# Patient Record
Sex: FEMALE | Race: WHITE | Employment: OTHER | URBAN - METROPOLITAN AREA
[De-identification: names, ages, dates, MRNs, and addresses within clinical notes are randomized per-mention and may not be internally consistent; named-entity substitution may affect disease eponyms.]

---

## 2017-03-07 PROBLEM — M25.562 CHRONIC PAIN OF LEFT KNEE: Status: ACTIVE | Noted: 2017-03-07

## 2017-03-07 PROBLEM — G89.29 CHRONIC PAIN OF LEFT KNEE: Status: ACTIVE | Noted: 2017-03-07

## 2018-04-23 ENCOUNTER — ANESTHESIA EVENT (OUTPATIENT)
Dept: OPERATING ROOM | Age: 63
End: 2018-04-23
Payer: MEDICAID

## 2018-04-24 ENCOUNTER — ANESTHESIA (OUTPATIENT)
Dept: OPERATING ROOM | Age: 63
End: 2018-04-24
Payer: MEDICAID

## 2018-04-24 ENCOUNTER — HOSPITAL ENCOUNTER (OUTPATIENT)
Age: 63
Setting detail: OUTPATIENT SURGERY
Discharge: HOME | End: 2018-04-24
Attending: PHYSICAL MEDICINE & REHABILITATION | Admitting: PHYSICAL MEDICINE & REHABILITATION
Payer: MEDICAID

## 2018-04-24 ENCOUNTER — HOSPITAL ENCOUNTER (OUTPATIENT)
Dept: OPERATING ROOM | Age: 63
Discharge: HOME | End: 2018-04-24
Payer: MEDICAID

## 2018-04-24 VITALS
RESPIRATION RATE: 16 BRPM | OXYGEN SATURATION: 97 % | WEIGHT: 238 LBS | HEART RATE: 80 BPM | SYSTOLIC BLOOD PRESSURE: 161 MMHG | HEIGHT: 66 IN | DIASTOLIC BLOOD PRESSURE: 82 MMHG | BODY MASS INDEX: 38.25 KG/M2 | TEMPERATURE: 98 F

## 2018-04-24 VITALS
SYSTOLIC BLOOD PRESSURE: 173 MMHG | OXYGEN SATURATION: 100 % | DIASTOLIC BLOOD PRESSURE: 102 MMHG | RESPIRATION RATE: 12 BRPM

## 2018-04-24 DIAGNOSIS — M46.1 SACROILIITIS (HCC): ICD-10-CM

## 2018-04-24 PROBLEM — M47.818 SACRAL SPONDYLOSIS: Chronic | Status: ACTIVE | Noted: 2018-04-24

## 2018-04-24 PROCEDURE — 3700000000 HC ANESTHESIA ATTENDED CARE: Performed by: PHYSICAL MEDICINE & REHABILITATION

## 2018-04-24 PROCEDURE — 2580000003 HC RX 258: Performed by: ANESTHESIOLOGY

## 2018-04-24 PROCEDURE — 2500000003 HC RX 250 WO HCPCS: Performed by: PHYSICAL MEDICINE & REHABILITATION

## 2018-04-24 PROCEDURE — 6360000002 HC RX W HCPCS: Performed by: NURSE ANESTHETIST, CERTIFIED REGISTERED

## 2018-04-24 PROCEDURE — 6360000002 HC RX W HCPCS: Performed by: PHYSICAL MEDICINE & REHABILITATION

## 2018-04-24 PROCEDURE — 3209999900 FLUORO FOR SURGICAL PROCEDURES

## 2018-04-24 PROCEDURE — 7100000011 HC PHASE II RECOVERY - ADDTL 15 MIN: Performed by: PHYSICAL MEDICINE & REHABILITATION

## 2018-04-24 PROCEDURE — 7100000010 HC PHASE II RECOVERY - FIRST 15 MIN: Performed by: PHYSICAL MEDICINE & REHABILITATION

## 2018-04-24 PROCEDURE — 2500000003 HC RX 250 WO HCPCS: Performed by: NURSE ANESTHETIST, CERTIFIED REGISTERED

## 2018-04-24 PROCEDURE — 3600000005 HC SURGERY LEVEL 5 BASE: Performed by: PHYSICAL MEDICINE & REHABILITATION

## 2018-04-24 RX ORDER — FENTANYL CITRATE 50 UG/ML
50 INJECTION, SOLUTION INTRAMUSCULAR; INTRAVENOUS EVERY 5 MIN PRN
Status: DISCONTINUED | OUTPATIENT
Start: 2018-04-24 | End: 2018-04-24 | Stop reason: HOSPADM

## 2018-04-24 RX ORDER — LABETALOL HYDROCHLORIDE 5 MG/ML
5 INJECTION, SOLUTION INTRAVENOUS EVERY 10 MIN PRN
Status: DISCONTINUED | OUTPATIENT
Start: 2018-04-24 | End: 2018-04-24 | Stop reason: HOSPADM

## 2018-04-24 RX ORDER — SODIUM CHLORIDE, SODIUM LACTATE, POTASSIUM CHLORIDE, CALCIUM CHLORIDE 600; 310; 30; 20 MG/100ML; MG/100ML; MG/100ML; MG/100ML
INJECTION, SOLUTION INTRAVENOUS CONTINUOUS
Status: DISCONTINUED | OUTPATIENT
Start: 2018-04-24 | End: 2018-04-24 | Stop reason: HOSPADM

## 2018-04-24 RX ORDER — LIDOCAINE HYDROCHLORIDE 10 MG/ML
INJECTION, SOLUTION EPIDURAL; INFILTRATION; INTRACAUDAL; PERINEURAL PRN
Status: DISCONTINUED | OUTPATIENT
Start: 2018-04-24 | End: 2018-04-24 | Stop reason: HOSPADM

## 2018-04-24 RX ORDER — OXYCODONE HYDROCHLORIDE AND ACETAMINOPHEN 5; 325 MG/1; MG/1
1 TABLET ORAL EVERY 4 HOURS PRN
Status: DISCONTINUED | OUTPATIENT
Start: 2018-04-24 | End: 2018-04-24 | Stop reason: HOSPADM

## 2018-04-24 RX ORDER — MEPERIDINE HYDROCHLORIDE 25 MG/ML
12.5 INJECTION INTRAMUSCULAR; INTRAVENOUS; SUBCUTANEOUS EVERY 5 MIN PRN
Status: DISCONTINUED | OUTPATIENT
Start: 2018-04-24 | End: 2018-04-24 | Stop reason: HOSPADM

## 2018-04-24 RX ORDER — HYDRALAZINE HYDROCHLORIDE 20 MG/ML
5 INJECTION INTRAMUSCULAR; INTRAVENOUS EVERY 10 MIN PRN
Status: DISCONTINUED | OUTPATIENT
Start: 2018-04-24 | End: 2018-04-24 | Stop reason: HOSPADM

## 2018-04-24 RX ORDER — ALFENTANIL HYDROCHLORIDE 500 UG/ML
INJECTION INTRAVENOUS PRN
Status: DISCONTINUED | OUTPATIENT
Start: 2018-04-24 | End: 2018-04-24 | Stop reason: SDUPTHER

## 2018-04-24 RX ORDER — PROMETHAZINE HYDROCHLORIDE 25 MG/ML
25 INJECTION, SOLUTION INTRAMUSCULAR; INTRAVENOUS
Status: DISCONTINUED | OUTPATIENT
Start: 2018-04-24 | End: 2018-04-24 | Stop reason: HOSPADM

## 2018-04-24 RX ORDER — PROPOFOL 10 MG/ML
INJECTION, EMULSION INTRAVENOUS PRN
Status: DISCONTINUED | OUTPATIENT
Start: 2018-04-24 | End: 2018-04-24 | Stop reason: SDUPTHER

## 2018-04-24 RX ORDER — MORPHINE SULFATE 2 MG/ML
1 INJECTION, SOLUTION INTRAMUSCULAR; INTRAVENOUS EVERY 5 MIN PRN
Status: DISCONTINUED | OUTPATIENT
Start: 2018-04-24 | End: 2018-04-24 | Stop reason: HOSPADM

## 2018-04-24 RX ORDER — LIDOCAINE HYDROCHLORIDE 20 MG/ML
INJECTION, SOLUTION EPIDURAL; INFILTRATION; INTRACAUDAL; PERINEURAL PRN
Status: DISCONTINUED | OUTPATIENT
Start: 2018-04-24 | End: 2018-04-24 | Stop reason: SDUPTHER

## 2018-04-24 RX ADMIN — ALFENTANIL HYDROCHLORIDE 250 MCG: 500 INJECTION INTRAVENOUS at 12:12

## 2018-04-24 RX ADMIN — LIDOCAINE HYDROCHLORIDE 20 MG: 20 INJECTION, SOLUTION EPIDURAL; INFILTRATION; INTRACAUDAL; PERINEURAL at 12:13

## 2018-04-24 RX ADMIN — SODIUM CHLORIDE, POTASSIUM CHLORIDE, SODIUM LACTATE AND CALCIUM CHLORIDE: 600; 310; 30; 20 INJECTION, SOLUTION INTRAVENOUS at 10:35

## 2018-04-24 RX ADMIN — PROPOFOL 20 MG: 10 INJECTION, EMULSION INTRAVENOUS at 12:14

## 2018-04-24 RX ADMIN — PROPOFOL 20 MG: 10 INJECTION, EMULSION INTRAVENOUS at 12:13

## 2018-04-24 ASSESSMENT — PAIN DESCRIPTION - PAIN TYPE
TYPE: SURGICAL PAIN;CHRONIC PAIN
TYPE: SURGICAL PAIN;CHRONIC PAIN
TYPE: CHRONIC PAIN

## 2018-04-24 ASSESSMENT — PAIN DESCRIPTION - ORIENTATION
ORIENTATION: LEFT;LOWER;UPPER
ORIENTATION: LEFT;LOWER
ORIENTATION: LEFT;LOWER

## 2018-04-24 ASSESSMENT — PAIN DESCRIPTION - FREQUENCY
FREQUENCY: CONTINUOUS

## 2018-04-24 ASSESSMENT — PAIN DESCRIPTION - LOCATION
LOCATION: BACK

## 2018-04-24 ASSESSMENT — PAIN - FUNCTIONAL ASSESSMENT: PAIN_FUNCTIONAL_ASSESSMENT: 0-10

## 2018-04-24 ASSESSMENT — PAIN DESCRIPTION - DESCRIPTORS
DESCRIPTORS: DISCOMFORT
DESCRIPTORS: ACHING;DISCOMFORT
DESCRIPTORS: ACHING;DISCOMFORT

## 2018-04-24 ASSESSMENT — PAIN SCALES - GENERAL
PAINLEVEL_OUTOF10: 5
PAINLEVEL_OUTOF10: 7
PAINLEVEL_OUTOF10: 5

## 2018-04-24 ASSESSMENT — PULMONARY FUNCTION TESTS
PIF_VALUE: 0

## 2018-04-24 ASSESSMENT — LIFESTYLE VARIABLES: SMOKING_STATUS: 1

## 2018-04-30 ENCOUNTER — ANESTHESIA EVENT (OUTPATIENT)
Dept: OPERATING ROOM | Age: 63
End: 2018-04-30
Payer: MEDICAID

## 2018-04-30 ASSESSMENT — LIFESTYLE VARIABLES: SMOKING_STATUS: 1

## 2018-05-01 ENCOUNTER — HOSPITAL ENCOUNTER (OUTPATIENT)
Age: 63
Setting detail: OUTPATIENT SURGERY
Discharge: HOME | End: 2018-05-01
Attending: PHYSICAL MEDICINE & REHABILITATION | Admitting: PHYSICAL MEDICINE & REHABILITATION
Payer: MEDICAID

## 2018-05-01 ENCOUNTER — ANESTHESIA (OUTPATIENT)
Dept: OPERATING ROOM | Age: 63
End: 2018-05-01
Payer: MEDICAID

## 2018-05-01 VITALS
HEART RATE: 77 BPM | SYSTOLIC BLOOD PRESSURE: 115 MMHG | DIASTOLIC BLOOD PRESSURE: 64 MMHG | HEIGHT: 66 IN | TEMPERATURE: 98.6 F | RESPIRATION RATE: 16 BRPM | WEIGHT: 238 LBS | BODY MASS INDEX: 38.25 KG/M2 | OXYGEN SATURATION: 98 %

## 2018-05-01 VITALS
SYSTOLIC BLOOD PRESSURE: 140 MMHG | RESPIRATION RATE: 9 BRPM | TEMPERATURE: 97.7 F | OXYGEN SATURATION: 99 % | DIASTOLIC BLOOD PRESSURE: 90 MMHG

## 2018-05-01 DIAGNOSIS — M46.1 SACROILIITIS (HCC): ICD-10-CM

## 2018-05-01 PROCEDURE — 3700000000 HC ANESTHESIA ATTENDED CARE: Performed by: PHYSICAL MEDICINE & REHABILITATION

## 2018-05-01 PROCEDURE — 2500000003 HC RX 250 WO HCPCS: Performed by: PHYSICAL MEDICINE & REHABILITATION

## 2018-05-01 PROCEDURE — 7100000011 HC PHASE II RECOVERY - ADDTL 15 MIN: Performed by: PHYSICAL MEDICINE & REHABILITATION

## 2018-05-01 PROCEDURE — 7100000010 HC PHASE II RECOVERY - FIRST 15 MIN: Performed by: PHYSICAL MEDICINE & REHABILITATION

## 2018-05-01 PROCEDURE — 6360000002 HC RX W HCPCS: Performed by: NURSE ANESTHETIST, CERTIFIED REGISTERED

## 2018-05-01 PROCEDURE — 2500000003 HC RX 250 WO HCPCS: Performed by: NURSE ANESTHETIST, CERTIFIED REGISTERED

## 2018-05-01 PROCEDURE — 3600000005 HC SURGERY LEVEL 5 BASE: Performed by: PHYSICAL MEDICINE & REHABILITATION

## 2018-05-01 PROCEDURE — 2580000003 HC RX 258: Performed by: ANESTHESIOLOGY

## 2018-05-01 PROCEDURE — 6360000002 HC RX W HCPCS: Performed by: PHYSICAL MEDICINE & REHABILITATION

## 2018-05-01 RX ORDER — SODIUM CHLORIDE, SODIUM LACTATE, POTASSIUM CHLORIDE, CALCIUM CHLORIDE 600; 310; 30; 20 MG/100ML; MG/100ML; MG/100ML; MG/100ML
INJECTION, SOLUTION INTRAVENOUS CONTINUOUS
Status: DISCONTINUED | OUTPATIENT
Start: 2018-05-01 | End: 2018-05-01 | Stop reason: HOSPADM

## 2018-05-01 RX ORDER — FENTANYL CITRATE 50 UG/ML
50 INJECTION, SOLUTION INTRAMUSCULAR; INTRAVENOUS EVERY 5 MIN PRN
Status: DISCONTINUED | OUTPATIENT
Start: 2018-05-01 | End: 2018-05-01 | Stop reason: HOSPADM

## 2018-05-01 RX ORDER — ALFENTANIL HYDROCHLORIDE 500 UG/ML
INJECTION INTRAVENOUS PRN
Status: DISCONTINUED | OUTPATIENT
Start: 2018-05-01 | End: 2018-05-01 | Stop reason: SDUPTHER

## 2018-05-01 RX ORDER — MORPHINE SULFATE 2 MG/ML
1 INJECTION, SOLUTION INTRAMUSCULAR; INTRAVENOUS EVERY 5 MIN PRN
Status: DISCONTINUED | OUTPATIENT
Start: 2018-05-01 | End: 2018-05-01 | Stop reason: HOSPADM

## 2018-05-01 RX ORDER — OXYCODONE HYDROCHLORIDE AND ACETAMINOPHEN 5; 325 MG/1; MG/1
1 TABLET ORAL EVERY 4 HOURS PRN
Status: DISCONTINUED | OUTPATIENT
Start: 2018-05-01 | End: 2018-05-01 | Stop reason: HOSPADM

## 2018-05-01 RX ORDER — MEPERIDINE HYDROCHLORIDE 25 MG/ML
12.5 INJECTION INTRAMUSCULAR; INTRAVENOUS; SUBCUTANEOUS EVERY 5 MIN PRN
Status: DISCONTINUED | OUTPATIENT
Start: 2018-05-01 | End: 2018-05-01 | Stop reason: HOSPADM

## 2018-05-01 RX ORDER — MIDAZOLAM HYDROCHLORIDE 1 MG/ML
INJECTION INTRAMUSCULAR; INTRAVENOUS PRN
Status: DISCONTINUED | OUTPATIENT
Start: 2018-05-01 | End: 2018-05-01 | Stop reason: SDUPTHER

## 2018-05-01 RX ORDER — PROPOFOL 10 MG/ML
INJECTION, EMULSION INTRAVENOUS PRN
Status: DISCONTINUED | OUTPATIENT
Start: 2018-05-01 | End: 2018-05-01 | Stop reason: SDUPTHER

## 2018-05-01 RX ORDER — PROMETHAZINE HYDROCHLORIDE 25 MG/ML
25 INJECTION, SOLUTION INTRAMUSCULAR; INTRAVENOUS
Status: DISCONTINUED | OUTPATIENT
Start: 2018-05-01 | End: 2018-05-01 | Stop reason: HOSPADM

## 2018-05-01 RX ORDER — HYDRALAZINE HYDROCHLORIDE 20 MG/ML
5 INJECTION INTRAMUSCULAR; INTRAVENOUS EVERY 10 MIN PRN
Status: DISCONTINUED | OUTPATIENT
Start: 2018-05-01 | End: 2018-05-01 | Stop reason: HOSPADM

## 2018-05-01 RX ORDER — LIDOCAINE HYDROCHLORIDE 10 MG/ML
INJECTION, SOLUTION EPIDURAL; INFILTRATION; INTRACAUDAL; PERINEURAL PRN
Status: DISCONTINUED | OUTPATIENT
Start: 2018-05-01 | End: 2018-05-01 | Stop reason: HOSPADM

## 2018-05-01 RX ORDER — LABETALOL HYDROCHLORIDE 5 MG/ML
5 INJECTION, SOLUTION INTRAVENOUS EVERY 10 MIN PRN
Status: DISCONTINUED | OUTPATIENT
Start: 2018-05-01 | End: 2018-05-01 | Stop reason: HOSPADM

## 2018-05-01 RX ADMIN — MIDAZOLAM HYDROCHLORIDE 2 MG: 1 INJECTION INTRAMUSCULAR; INTRAVENOUS at 08:18

## 2018-05-01 RX ADMIN — SODIUM CHLORIDE, POTASSIUM CHLORIDE, SODIUM LACTATE AND CALCIUM CHLORIDE: 600; 310; 30; 20 INJECTION, SOLUTION INTRAVENOUS at 07:34

## 2018-05-01 RX ADMIN — ALFENTANIL HYDROCHLORIDE 250 MCG: 500 INJECTION INTRAVENOUS at 08:18

## 2018-05-01 RX ADMIN — PROPOFOL 40 MG: 10 INJECTION, EMULSION INTRAVENOUS at 08:20

## 2018-05-01 RX ADMIN — ALFENTANIL HYDROCHLORIDE 250 MCG: 500 INJECTION INTRAVENOUS at 08:20

## 2018-05-01 ASSESSMENT — PULMONARY FUNCTION TESTS
PIF_VALUE: 0

## 2018-05-01 ASSESSMENT — PAIN - FUNCTIONAL ASSESSMENT: PAIN_FUNCTIONAL_ASSESSMENT: 0-10

## 2018-05-01 ASSESSMENT — PAIN SCALES - GENERAL
PAINLEVEL_OUTOF10: 0

## 2018-05-01 ASSESSMENT — PAIN DESCRIPTION - DESCRIPTORS: DESCRIPTORS: ACHING

## 2018-05-29 LAB

## 2018-06-18 RX ORDER — SODIUM CHLORIDE, SODIUM LACTATE, POTASSIUM CHLORIDE, CALCIUM CHLORIDE 600; 310; 30; 20 MG/100ML; MG/100ML; MG/100ML; MG/100ML
INJECTION, SOLUTION INTRAVENOUS CONTINUOUS
Status: CANCELLED | OUTPATIENT
Start: 2018-06-18

## 2018-06-25 ENCOUNTER — ANESTHESIA EVENT (OUTPATIENT)
Dept: OPERATING ROOM | Age: 63
End: 2018-06-25
Payer: MEDICAID

## 2018-06-25 ASSESSMENT — LIFESTYLE VARIABLES: SMOKING_STATUS: 1

## 2018-06-25 NOTE — ANESTHESIA PRE PROCEDURE
tablet 5    lamoTRIgine (LAMICTAL) 200 MG tablet Take 200 mg by mouth daily       LORazepam (ATIVAN) 1 MG tablet Take 1 mg by mouth every 8 hours as needed.  QUEtiapine (SEROQUEL) 100 MG tablet Take 700 mg by mouth nightly       QUEtiapine (SEROQUEL XR) 400 MG XR tablet Take 400 mg by mouth nightly.  hydrocortisone 2.5 % cream Apply  topically as needed. Apply topically 2 times daily. No current facility-administered medications for this visit. Allergies:     Allergies   Allergen Reactions    Latex      With condom     Benadryl [Diphenhydramine]      Does the opposite      Sulfa Antibiotics      As a child    Vistaril [Hydroxyzine Hcl]      \"does the complete opposite\"       Problem List:    Patient Active Problem List   Diagnosis Code    Neck pain M54.2    Back pain M54.9    Cerebral atherosclerosis I67.2    Cervical disc displacement M50.20    Cervical radiculopathy M54.12    Disc displacement, lumbar M51.26    Lumbar radiculopathy M54.16    Peripheral neuropathy (HCC) G62.9    Chronic pain of left knee M25.562, G89.29    Sacroiliitis  M46.1    Sacral spondylosis M47.818       Past Medical History:        Diagnosis Date    Asthma     [facility]     Cancer ([facility]     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 Pedro Juarez    Chronic back pain     upper, mid and lower back    Dementia     Depression     bipolar    H/O being hospitalized     for cervical, thoracic, and lumbar pain, at [address] H/O colonoscopy     Head injury two years ago    Headache(784.0)     8/10 severity    Memory difficulties     mispelling words, dialing wrong numbers    Migraine     10/10 severity, 6 per year    Numbness and tingling     both arms    OCD (obsessive compulsive disorder)        Past Surgical History:        Procedure Laterality Date    HYSTERECTOMY      partial   [address] NERVE BLOCK Left 04/24/2018    left sacroiliac joint injection #1 with IV sedation    NERVE BLOCK Left 05/01/2018    left sacroiliac joint injection #2    [place] OSMEL NOSE/CLEFT LIP/TIP Left 4/24/2018    LEFT SACROILIAC JOINT INJECTION #1 WITH IV SEDATION performed by Gladis Cruz DO at [address] [facility] NOSE/CLEFT LIP/TIP Left 5/1/2018    LEFT SACROILIAC JOINT INJECTION #2 WITH IV SEDATION performed by Gladis Cruz DO at [address]  2005    2 neck surgeries & 1 -LB surgery       Social History:    Social History   Substance Use Topics    Smoking status: Current Every Day Smoker     Packs/day: 1.00     Years: 10.00    Smokeless tobacco: Never Used    Alcohol use Yes      Comment: moderate                                Ready to quit: Not Answered  Counseling given: Not Answered      Vital Signs (Current): There were no vitals filed for this visit. BP Readings from Last 3 Encounters:   05/01/18 (!) 140/90   05/01/18 115/64   04/24/18 (!) 173/102       NPO Status:                                                                             BMI:   Wt Readings from Last 3 Encounters:   05/01/18 238 lb (108 kg)   04/24/18 238 lb (108 kg)   08/30/17 240 lb (108.9 kg)     There is no height or weight on file to calculate BMI.    CBC: No results found for: WBC, RBC, HGB, HCT, MCV, RDW, PLT    CMP: No results found for: NA, K, CL, CO2, BUN, CREATININE, GFRAA, AGRATIO, LABGLOM, GLUCOSE, PROT, CALCIUM, BILITOT, ALKPHOS, AST, ALT    POC Tests: No results for input(s): POCGLU, POCNA, POCK, POCCL, POCBUN, POCHEMO, POCHCT in the last 72 hours.     Coags: No results found for: PROTIME, INR, APTT    HCG (If Applicable): No results found for: PREGTESTUR, PREGSERUM, HCG, HCGQUANT     ABGs: No results found for: PHART, PO2ART, KGC2OVU, OYD7FLJ, BEART, A5RQNTOB     Type & Screen (If Applicable):  No results found for: [facility]    Anesthesia Evaluation  Patient summary reviewed  Airway: Mallampati: III  TM distance: >3 FB   Neck ROM: limited  Mouth opening: > = 3 FB Dental: normal exam     Comment: Stained teeth    Pulmonary: breath sounds clear to auscultation  (+) asthma: current smoker          Patient did not smoke on day of surgery. ROS comment: Current Smoker 1 ppd. Cardiovascular:Negative CV ROS  Exercise tolerance: good (>4 METS),           Rhythm: regular  Rate: normal                    Neuro/Psych:   (+) neuromuscular disease:, headaches (H/O Head injury.): migraine headaches, psychiatric history:             ROS comment: Chronic Back Problems  Numbness and tingling. Memory problems. GI/Hepatic/Renal: Neg GI/Hepatic/Renal ROS            Endo/Other: Negative Endo/Other ROS   (+) : arthritis:., .                  ROS comment: Cancer top left wrist and right hand. Abdominal:   (+) obese,         Vascular: negative vascular ROS. Anesthesia Plan      MAC     ASA 3       Induction: intravenous. Plan discussed with CRNA.     Attending anesthesiologist reviewed and agrees with Sangeeta King MD   6/25/2018

## 2018-06-26 ENCOUNTER — HOSPITAL ENCOUNTER (OUTPATIENT)
Dept: OPERATING ROOM | Age: 63
Discharge: HOME | End: 2018-06-26
Payer: MEDICAID

## 2018-06-26 ENCOUNTER — ANESTHESIA (OUTPATIENT)
Dept: OPERATING ROOM | Age: 63
End: 2018-06-26
Payer: MEDICAID

## 2018-06-26 DIAGNOSIS — M46.1 SACROILIITIS (HCC): ICD-10-CM

## 2018-07-23 ASSESSMENT — LIFESTYLE VARIABLES: SMOKING_STATUS: 1

## 2018-07-23 NOTE — ANESTHESIA PRE PROCEDURE
(LIPITOR) 20 MG tablet Take 1 tablet by mouth daily. 30 tablet 5    lamoTRIgine (LAMICTAL) 200 MG tablet Take 200 mg by mouth daily       LORazepam (ATIVAN) 1 MG tablet Take 1 mg by mouth every 8 hours as needed.  QUEtiapine (SEROQUEL) 100 MG tablet Take 700 mg by mouth nightly       hydrocortisone 2.5 % cream Apply  topically as needed. Apply topically 2 times daily. No current facility-administered medications for this visit. Allergies:     Allergies   Allergen Reactions    Latex      With condom     Benadryl [Diphenhydramine]      Does the opposite      Sulfa Antibiotics      As a child    Vistaril [Hydroxyzine Hcl]      \"does the complete opposite\"       Problem List:    Patient Active Problem List   Diagnosis Code    Neck pain M54.2    Back pain M54.9    Cerebral atherosclerosis I67.2    Cervical disc displacement M50.20    Cervical radiculopathy M54.12    Disc displacement, lumbar M51.26    Lumbar radiculopathy M54.16    Peripheral neuropathy (HCC) G62.9    Chronic pain of left knee M25.562, G89.29    Sacroiliitis  M46.1    Sacral spondylosis M47.818       Past Medical History:        Diagnosis Date    Asthma     [facility] ([facility]     Cancer ([facility] [address] [facility]     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 Ingrid Gaston    Chronic back pain     upper, mid and lower back    Dementia     Depression     bipolar    H/O being hospitalized     for cervical, thoracic, and lumbar pain, at [address] H/O colonoscopy     Head injury two years ago    Headache(784.0)     8/10 severity    Hyperlipidemia     Memory difficulties     mispelling words, dialing wrong numbers    Migraine     10/10 severity, 6 per year    Numbness and tingling     both arms    OCD (obsessive compulsive disorder)        Past Surgical History:        Procedure Laterality Date    HYSTERECTOMY      partial   [address]

## 2018-07-24 ENCOUNTER — HOSPITAL ENCOUNTER (OUTPATIENT)
Dept: OPERATING ROOM | Age: 63
Setting detail: OUTPATIENT SURGERY
Discharge: HOME | End: 2018-07-24
Attending: PHYSICAL MEDICINE & REHABILITATION
Payer: MEDICAID

## 2018-07-24 ENCOUNTER — HOSPITAL ENCOUNTER (OUTPATIENT)
Age: 63
Setting detail: OUTPATIENT SURGERY
Discharge: HOME | End: 2018-07-24
Attending: PHYSICAL MEDICINE & REHABILITATION | Admitting: PHYSICAL MEDICINE & REHABILITATION
Payer: MEDICAID

## 2018-07-24 VITALS
DIASTOLIC BLOOD PRESSURE: 84 MMHG | RESPIRATION RATE: 14 BRPM | SYSTOLIC BLOOD PRESSURE: 126 MMHG | OXYGEN SATURATION: 100 % | TEMPERATURE: 98.6 F

## 2018-07-24 VITALS
DIASTOLIC BLOOD PRESSURE: 72 MMHG | SYSTOLIC BLOOD PRESSURE: 136 MMHG | HEIGHT: 65 IN | WEIGHT: 227 LBS | OXYGEN SATURATION: 98 % | HEART RATE: 80 BPM | BODY MASS INDEX: 37.82 KG/M2 | RESPIRATION RATE: 14 BRPM | TEMPERATURE: 98.6 F

## 2018-07-24 DIAGNOSIS — M46.1 SACROILIITIS (HCC): ICD-10-CM

## 2018-07-24 PROCEDURE — 6360000002 HC RX W HCPCS: Performed by: NURSE ANESTHETIST, CERTIFIED REGISTERED

## 2018-07-24 PROCEDURE — 7100000011 HC PHASE II RECOVERY - ADDTL 15 MIN: Performed by: PHYSICAL MEDICINE & REHABILITATION

## 2018-07-24 PROCEDURE — 3700000001 HC ADD 15 MINUTES (ANESTHESIA): Performed by: PHYSICAL MEDICINE & REHABILITATION

## 2018-07-24 PROCEDURE — 7100000010 HC PHASE II RECOVERY - FIRST 15 MIN: Performed by: PHYSICAL MEDICINE & REHABILITATION

## 2018-07-24 PROCEDURE — 3600000015 HC SURGERY LEVEL 5 ADDTL 15MIN: Performed by: PHYSICAL MEDICINE & REHABILITATION

## 2018-07-24 PROCEDURE — 6360000002 HC RX W HCPCS: Performed by: PHYSICAL MEDICINE & REHABILITATION

## 2018-07-24 PROCEDURE — 2500000003 HC RX 250 WO HCPCS: Performed by: PHYSICAL MEDICINE & REHABILITATION

## 2018-07-24 PROCEDURE — 3700000000 HC ANESTHESIA ATTENDED CARE: Performed by: PHYSICAL MEDICINE & REHABILITATION

## 2018-07-24 PROCEDURE — 3209999900 FLUORO FOR SURGICAL PROCEDURES

## 2018-07-24 PROCEDURE — 2580000003 HC RX 258: Performed by: ANESTHESIOLOGY

## 2018-07-24 PROCEDURE — C1713 ANCHOR/SCREW BN/BN,TIS/BN: HCPCS | Performed by: PHYSICAL MEDICINE & REHABILITATION

## 2018-07-24 PROCEDURE — 2709999900 HC NON-CHARGEABLE SUPPLY: Performed by: PHYSICAL MEDICINE & REHABILITATION

## 2018-07-24 PROCEDURE — 3600000005 HC SURGERY LEVEL 5 BASE: Performed by: PHYSICAL MEDICINE & REHABILITATION

## 2018-07-24 RX ORDER — MORPHINE SULFATE 2 MG/ML
1 INJECTION, SOLUTION INTRAMUSCULAR; INTRAVENOUS EVERY 5 MIN PRN
Status: DISCONTINUED | OUTPATIENT
Start: 2018-07-24 | End: 2018-07-24 | Stop reason: HOSPADM

## 2018-07-24 RX ORDER — LABETALOL HYDROCHLORIDE 5 MG/ML
5 INJECTION, SOLUTION INTRAVENOUS EVERY 10 MIN PRN
Status: DISCONTINUED | OUTPATIENT
Start: 2018-07-24 | End: 2018-07-24 | Stop reason: HOSPADM

## 2018-07-24 RX ORDER — FENTANYL CITRATE 50 UG/ML
50 INJECTION, SOLUTION INTRAMUSCULAR; INTRAVENOUS EVERY 5 MIN PRN
Status: DISCONTINUED | OUTPATIENT
Start: 2018-07-24 | End: 2018-07-24 | Stop reason: HOSPADM

## 2018-07-24 RX ORDER — LIDOCAINE HYDROCHLORIDE 10 MG/ML
INJECTION, SOLUTION EPIDURAL; INFILTRATION; INTRACAUDAL; PERINEURAL PRN
Status: DISCONTINUED | OUTPATIENT
Start: 2018-07-24 | End: 2018-07-24 | Stop reason: HOSPADM

## 2018-07-24 RX ORDER — SODIUM CHLORIDE, SODIUM LACTATE, POTASSIUM CHLORIDE, CALCIUM CHLORIDE 600; 310; 30; 20 MG/100ML; MG/100ML; MG/100ML; MG/100ML
INJECTION, SOLUTION INTRAVENOUS CONTINUOUS
Status: DISCONTINUED | OUTPATIENT
Start: 2018-07-24 | End: 2018-07-24 | Stop reason: HOSPADM

## 2018-07-24 RX ORDER — MEPERIDINE HYDROCHLORIDE 25 MG/ML
12.5 INJECTION INTRAMUSCULAR; INTRAVENOUS; SUBCUTANEOUS EVERY 5 MIN PRN
Status: DISCONTINUED | OUTPATIENT
Start: 2018-07-24 | End: 2018-07-24 | Stop reason: HOSPADM

## 2018-07-24 RX ORDER — FENTANYL CITRATE 50 UG/ML
INJECTION, SOLUTION INTRAMUSCULAR; INTRAVENOUS PRN
Status: DISCONTINUED | OUTPATIENT
Start: 2018-07-24 | End: 2018-07-24 | Stop reason: SDUPTHER

## 2018-07-24 RX ORDER — MIDAZOLAM HYDROCHLORIDE 1 MG/ML
INJECTION INTRAMUSCULAR; INTRAVENOUS PRN
Status: DISCONTINUED | OUTPATIENT
Start: 2018-07-24 | End: 2018-07-24 | Stop reason: SDUPTHER

## 2018-07-24 RX ORDER — DIPHENHYDRAMINE HYDROCHLORIDE 50 MG/ML
12.5 INJECTION INTRAMUSCULAR; INTRAVENOUS
Status: DISCONTINUED | OUTPATIENT
Start: 2018-07-24 | End: 2018-07-24 | Stop reason: HOSPADM

## 2018-07-24 RX ORDER — HYDROCODONE BITARTRATE AND ACETAMINOPHEN 5; 325 MG/1; MG/1
2 TABLET ORAL PRN
Status: DISCONTINUED | OUTPATIENT
Start: 2018-07-24 | End: 2018-07-24 | Stop reason: HOSPADM

## 2018-07-24 RX ORDER — PROMETHAZINE HYDROCHLORIDE 25 MG/ML
25 INJECTION, SOLUTION INTRAMUSCULAR; INTRAVENOUS
Status: DISCONTINUED | OUTPATIENT
Start: 2018-07-24 | End: 2018-07-24 | Stop reason: HOSPADM

## 2018-07-24 RX ORDER — HYDROCODONE BITARTRATE AND ACETAMINOPHEN 5; 325 MG/1; MG/1
1 TABLET ORAL PRN
Status: DISCONTINUED | OUTPATIENT
Start: 2018-07-24 | End: 2018-07-24 | Stop reason: HOSPADM

## 2018-07-24 RX ORDER — HYDRALAZINE HYDROCHLORIDE 20 MG/ML
5 INJECTION INTRAMUSCULAR; INTRAVENOUS EVERY 10 MIN PRN
Status: DISCONTINUED | OUTPATIENT
Start: 2018-07-24 | End: 2018-07-24 | Stop reason: HOSPADM

## 2018-07-24 RX ADMIN — FENTANYL CITRATE 50 MCG: 50 INJECTION, SOLUTION INTRAMUSCULAR; INTRAVENOUS at 08:49

## 2018-07-24 RX ADMIN — FENTANYL CITRATE 50 MCG: 50 INJECTION, SOLUTION INTRAMUSCULAR; INTRAVENOUS at 08:51

## 2018-07-24 RX ADMIN — SODIUM CHLORIDE, POTASSIUM CHLORIDE, SODIUM LACTATE AND CALCIUM CHLORIDE: 600; 310; 30; 20 INJECTION, SOLUTION INTRAVENOUS at 08:35

## 2018-07-24 RX ADMIN — CEFAZOLIN SODIUM 2 G: 2 SOLUTION INTRAVENOUS at 08:54

## 2018-07-24 RX ADMIN — MIDAZOLAM 2 MG: 1 INJECTION INTRAMUSCULAR; INTRAVENOUS at 08:49

## 2018-07-24 ASSESSMENT — PULMONARY FUNCTION TESTS
PIF_VALUE: 0

## 2018-07-24 ASSESSMENT — PAIN SCALES - GENERAL
PAINLEVEL_OUTOF10: 0

## 2018-07-24 ASSESSMENT — PAIN DESCRIPTION - DESCRIPTORS: DESCRIPTORS: ACHING

## 2018-07-24 ASSESSMENT — PAIN - FUNCTIONAL ASSESSMENT: PAIN_FUNCTIONAL_ASSESSMENT: 0-10

## 2018-07-24 NOTE — H&P
7/24/2018    Sujey Martinez    MRN: [id number]    Chief Complaint Of:  Lower back pain    Present Illness: Elena Sales presents today for left sacroiliac joint RF. Elena reports constant aching pain in the lower back that radiates across the lower back. Elena denies new neurologic complaints, bowel or bladder complaints or any other associated symptoms. 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 Amparo Casillas    Chronic back pain     upper, mid and lower back    Dementia     Depression     bipolar    H/O being hospitalized     for cervical, thoracic, and lumbar pain, at [address] H/O colonoscopy     Head injury two years ago    Headache(784.0)     8/10 severity    Hyperlipidemia     Memory difficulties     mispelling words, dialing wrong numbers    Migraine     10/10 severity, 6 per year    Numbness and tingling     both arms    OCD (obsessive compulsive disorder)        Pertinent Family History:  family history includes Alzheimer's Disease in her mother; Sherlyn Mota in her brother; Dementia in her mother; Claus Lus in her brother; Parkinsonism in her father. Medications:    Current Facility-Administered Medications:     ceFAZolin (ANCEF) 2 g in dextrose 3 % 50 mL IVPB (duplex), 2 g, Intravenous, Once, Nikki Chung, DO    lactated ringers infusion, , Intravenous, Continuous, Harshal Coy MD    Allergies:  Latex; Benadryl [diphenhydramine]; Sulfa antibiotics; and Vistaril [hydroxyzine hcl]    PHYSICAL EXAMINATION / GENERAL APPEARANCE:    Head: Normocephalic    Heart[de-identified] Regular rate    Lungs: Clear    Abdomen: Positive bowel sounds.  obese    Other Significant Findings:  None    Impression / Initial Diagnosis: sacroiliitis/sacral spondylosis

## 2018-07-24 NOTE — OP NOTE
Elena Sales     7/24/2018     PRE-OPERATIVE DIAGNOSIS: Patient Active Problem List:     Sacroiliitis      Sacral spondylosis       POST-OPERATIVE DIAGNOSIS: Same     PROCEDURE: Fluoroscopic-guided Left lateral sacral nerve neurolysis at vertebral levels S1, S2 and S3    SURGEON: Joann Chung D.O. ANESTHESIA: LOCAL WITH MONITORED ANESTHESIA CARE (MAC)     ESTIMATED BLOOD LOSS: None.   ______________________________________________________________________   HISTORY & PHYSICAL:  Elena Sales presents today with the chief complaint of low back pain and for reassessment regarding the above diagnoses. The potential complications of the procedure were explained to [facility] again today and she has elected to undergo the aforementioned procedure. Nancy complete History & Physical examination were reviewed in depth, a copy of which is in the chart. PROCEDURE: After confirming written and informed consent, [facility] was brought to the procedure room and placed in the prone position. Blood pressure, heart rate, O2 saturation, and visual and verbal monitoring were established. A time-out was performed and her name and date of birth, the procedure to be performed as well as the plan for the location of the needle insertion were confirmed. Fluoroscopy was utilized to delineate the anatomy of the lumbosacral spine. Surface landmarks were identified as well. After prep and drape, an oblique fluoroscopic view was created to optimize visualization of the Left sacroiliac joint. After infiltration of local (1cc 1% lidocaine), a # 20-gauge 150-mm 10- mm active tip radiofrequency ablation needle was passed to position the tip at the medial aspect of the sacroiliac joint, along the course of the lateral sacral nerve of S1. Satisfactory needle placement was confirmed by AP, lateral and oblique projections. The above procedure was then conducted at the S2 and S3 levels.  Sensory and motor testing was then performed at each level. This testing reproduced pain in the Left sacroiliac region. No dysesthesias were noted in the lower extremities. She then received 0.75 cc of 1% PF xylocaine at each level. Negative aspiration was noted prior to each injection. Radiofrequency thermal ablation was then performed at 80 degrees celsius for 90  seconds. The needles were removed intact and a Band-Aids were applied. Elena was comfortable throughout the ablation. No complications were noted. In summary, medial branch neurolysis was performed at the following vertebral levels; Left S1, S2 and S3 lateral sacral nerves. Elena was transferred to the recovery area. She was monitored, reassessed and discharged after an appropriate observatory period. No complications were noted. Elena will follow up in our [facility] as scheduled. She was encouraged to call with questions, concerns or if worsening of symptoms occurs. Sally Chung D.O.

## 2018-07-24 NOTE — ANESTHESIA POSTPROCEDURE EVALUATION
Department of Anesthesiology  Postprocedure Note    Patient: Eleuterio Weiner  MRN: [id number]  YOB: 1955  Date of evaluation: 7/24/2018  Time:  10:34 AM     Procedure Summary     Date:  07/24/18 Room / Location:  [address] [place] [address] 04 / 315 [facility]    Anesthesia Start:  0847 Anesthesia Stop:  [phone]    Procedure:  LEFT SACROILIAC JOINT RADIOFREQUENCY LATERAL SACRAL NERVES S1 S2 S3 (Left ) Diagnosis:  (SACROILIITIS)    Surgeon:  Chris Galan DO Responsible Provider:  Slick Mijares MD    Anesthesia Type:  MAC ASA Status:  3          Anesthesia Type: MAC    Gina Phase I: Gina Score: 10    Gina Phase II: Gina Score: 10    Last vitals: Reviewed and per EMR flowsheets.        Anesthesia Post Evaluation    Patient location during evaluation: PACU  Patient participation: complete - patient participated  Level of consciousness: awake and alert  Airway patency: patent  Nausea & Vomiting: no nausea and no vomiting  Complications: no  Cardiovascular status: hemodynamically stable  Respiratory status: room air and spontaneous ventilation  Hydration status: stable

## 2018-08-27 ENCOUNTER — ANESTHESIA EVENT (OUTPATIENT)
Dept: OPERATING ROOM | Age: 63
End: 2018-08-27
Payer: MEDICAID

## 2018-08-27 ASSESSMENT — LIFESTYLE VARIABLES: SMOKING_STATUS: 1

## 2018-08-27 NOTE — ANESTHESIA PRE PROCEDURE
(LAMICTAL) 200 MG tablet Take 200 mg by mouth daily       LORazepam (ATIVAN) 1 MG tablet Take 1 mg by mouth every 8 hours as needed.  QUEtiapine (SEROQUEL) 100 MG tablet Take 800 mg by mouth nightly       hydrocortisone 2.5 % cream Apply  topically as needed. Apply topically 2 times daily. No current facility-administered medications for this visit. Allergies:     Allergies   Allergen Reactions    Latex      With condom     Benadryl [Diphenhydramine]      Does the opposite      Sulfa Antibiotics      As a child    Vistaril [Hydroxyzine Hcl]      \"does the complete opposite\"       Problem List:    Patient Active Problem List   Diagnosis Code    Neck pain M54.2    Back pain M54.9    Cerebral atherosclerosis I67.2    Cervical disc displacement M50.20    Cervical radiculopathy M54.12    Disc displacement, lumbar M51.26    Lumbar radiculopathy M54.16    Peripheral neuropathy G62.9    Chronic pain of left knee M25.562, G89.29    Sacroiliitis  M46.1    Sacral spondylosis M47.818       Past Medical History:        Diagnosis Date    Asthma     [facility] [address] [facility]     Cancer ([facility] [address]     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 Pranay Lawson    Chronic back pain     upper, mid and lower back    Dementia     Depression     bipolar    H/O being hospitalized     for cervical, thoracic, and lumbar pain, at [address] H/O colonoscopy     Head injury two years ago    Headache(784.0)     8/10 severity    Hyperlipidemia     Memory difficulties     mispelling words, dialing wrong numbers    Migraine     10/10 severity, 6 per year    Numbness and tingling     both arms    OCD (obsessive compulsive disorder)        Past Surgical History:        Procedure Laterality Date    HYSTERECTOMY      partial   [address] [facility]    NERVE BLOCK Left 04/24/2018    left sacroiliac joint injection #1 with IV sedation    NERVE SIV1TAF, [id number], BEART, [id number]     Type & Screen (If Applicable):  No results found for: [facility]    Anesthesia Evaluation  Patient summary reviewed no history of anesthetic complications:   Airway: Mallampati: III  TM distance: >3 FB   Neck ROM: limited  Mouth opening: > = 3 FB Dental: normal exam     Comment: Stained teeth    Pulmonary: breath sounds clear to auscultation  (+) asthma: current smoker          Patient smoked on day of surgery. ROS comment: Current Smoker 1 ppd. Cardiovascular:Negative CV ROS  Exercise tolerance: good (>4 METS),           Rhythm: regular  Rate: normal                    Neuro/Psych:   (+) neuromuscular disease:, headaches (H/O Head injury.): migraine headaches, psychiatric history:             ROS comment: Chronic Back Problems  Numbness and tingling. Memory problems. GI/Hepatic/Renal: Neg GI/Hepatic/Renal ROS            Endo/Other: Negative Endo/Other ROS   (+) : arthritis:., .                  ROS comment: Cancer top left wrist and right hand. Abdominal:   (+) obese,         Vascular: negative vascular ROS. Anesthesia Plan      MAC     ASA 3       Induction: intravenous. Anesthetic plan and risks discussed with patient. Plan discussed with CRNA. PAT Chart Review:  Chart reviewed per routine on August 27, 2018 at 11:20 AM by Tia Stallings MD.  Above represents information available via shared medical record including previous anesthesia history, drug and allergy history.   Confirmation of above and final plan per Day of Surgery (DOS) anesthesiologist.      Tia Stallings MD   8/27/2018

## 2018-08-28 ENCOUNTER — ANESTHESIA (OUTPATIENT)
Dept: OPERATING ROOM | Age: 63
End: 2018-08-28
Payer: MEDICAID

## 2018-08-28 ENCOUNTER — HOSPITAL ENCOUNTER (OUTPATIENT)
Age: 63
Setting detail: OUTPATIENT SURGERY
Discharge: HOME | End: 2018-08-28
Attending: PHYSICAL MEDICINE & REHABILITATION | Admitting: PHYSICAL MEDICINE & REHABILITATION
Payer: MEDICAID

## 2018-08-28 ENCOUNTER — HOSPITAL ENCOUNTER (OUTPATIENT)
Dept: OPERATING ROOM | Age: 63
Setting detail: OUTPATIENT SURGERY
Discharge: HOME | End: 2018-08-28
Attending: PHYSICAL MEDICINE & REHABILITATION
Payer: MEDICAID

## 2018-08-28 VITALS
SYSTOLIC BLOOD PRESSURE: 120 MMHG | TEMPERATURE: 98 F | HEART RATE: 88 BPM | RESPIRATION RATE: 14 BRPM | BODY MASS INDEX: 37.61 KG/M2 | DIASTOLIC BLOOD PRESSURE: 64 MMHG | WEIGHT: 234 LBS | OXYGEN SATURATION: 98 % | HEIGHT: 66 IN

## 2018-08-28 VITALS
OXYGEN SATURATION: 98 % | TEMPERATURE: 98.6 F | RESPIRATION RATE: 16 BRPM | SYSTOLIC BLOOD PRESSURE: 99 MMHG | DIASTOLIC BLOOD PRESSURE: 65 MMHG

## 2018-08-28 DIAGNOSIS — M54.16 LUMBAR RADICULOPATHY: ICD-10-CM

## 2018-08-28 PROCEDURE — 7100000011 HC PHASE II RECOVERY - ADDTL 15 MIN: Performed by: PHYSICAL MEDICINE & REHABILITATION

## 2018-08-28 PROCEDURE — 2500000003 HC RX 250 WO HCPCS: Performed by: PHYSICAL MEDICINE & REHABILITATION

## 2018-08-28 PROCEDURE — 3600000005 HC SURGERY LEVEL 5 BASE: Performed by: PHYSICAL MEDICINE & REHABILITATION

## 2018-08-28 PROCEDURE — 6360000002 HC RX W HCPCS: Performed by: PHYSICAL MEDICINE & REHABILITATION

## 2018-08-28 PROCEDURE — 2580000003 HC RX 258: Performed by: ANESTHESIOLOGY

## 2018-08-28 PROCEDURE — 7100000010 HC PHASE II RECOVERY - FIRST 15 MIN: Performed by: PHYSICAL MEDICINE & REHABILITATION

## 2018-08-28 PROCEDURE — 2580000003 HC RX 258: Performed by: PHYSICAL MEDICINE & REHABILITATION

## 2018-08-28 PROCEDURE — 2709999900 HC NON-CHARGEABLE SUPPLY: Performed by: PHYSICAL MEDICINE & REHABILITATION

## 2018-08-28 PROCEDURE — 3700000001 HC ADD 15 MINUTES (ANESTHESIA): Performed by: PHYSICAL MEDICINE & REHABILITATION

## 2018-08-28 PROCEDURE — 3209999900 FLUORO FOR SURGICAL PROCEDURES

## 2018-08-28 PROCEDURE — 2500000003 HC RX 250 WO HCPCS: Performed by: NURSE ANESTHETIST, CERTIFIED REGISTERED

## 2018-08-28 PROCEDURE — 3700000000 HC ANESTHESIA ATTENDED CARE: Performed by: PHYSICAL MEDICINE & REHABILITATION

## 2018-08-28 PROCEDURE — 6360000002 HC RX W HCPCS: Performed by: NURSE ANESTHETIST, CERTIFIED REGISTERED

## 2018-08-28 PROCEDURE — 3600000015 HC SURGERY LEVEL 5 ADDTL 15MIN: Performed by: PHYSICAL MEDICINE & REHABILITATION

## 2018-08-28 RX ORDER — 0.9 % SODIUM CHLORIDE 0.9 %
INTRAVENOUS SOLUTION INTRAVENOUS CONTINUOUS PRN
Status: DISCONTINUED | OUTPATIENT
Start: 2018-08-28 | End: 2018-08-28 | Stop reason: HOSPADM

## 2018-08-28 RX ORDER — ALFENTANIL HYDROCHLORIDE 500 UG/ML
INJECTION INTRAVENOUS PRN
Status: DISCONTINUED | OUTPATIENT
Start: 2018-08-28 | End: 2018-08-28 | Stop reason: SDUPTHER

## 2018-08-28 RX ORDER — MIDAZOLAM HYDROCHLORIDE 1 MG/ML
INJECTION INTRAMUSCULAR; INTRAVENOUS PRN
Status: DISCONTINUED | OUTPATIENT
Start: 2018-08-28 | End: 2018-08-28 | Stop reason: SDUPTHER

## 2018-08-28 RX ORDER — LIDOCAINE HYDROCHLORIDE 20 MG/ML
INJECTION, SOLUTION INFILTRATION; PERINEURAL PRN
Status: DISCONTINUED | OUTPATIENT
Start: 2018-08-28 | End: 2018-08-28 | Stop reason: SDUPTHER

## 2018-08-28 RX ORDER — PROPOFOL 10 MG/ML
INJECTION, EMULSION INTRAVENOUS PRN
Status: DISCONTINUED | OUTPATIENT
Start: 2018-08-28 | End: 2018-08-28 | Stop reason: SDUPTHER

## 2018-08-28 RX ORDER — SODIUM CHLORIDE, SODIUM LACTATE, POTASSIUM CHLORIDE, CALCIUM CHLORIDE 600; 310; 30; 20 MG/100ML; MG/100ML; MG/100ML; MG/100ML
INJECTION, SOLUTION INTRAVENOUS CONTINUOUS
Status: DISCONTINUED | OUTPATIENT
Start: 2018-08-28 | End: 2018-08-28 | Stop reason: HOSPADM

## 2018-08-28 RX ADMIN — MIDAZOLAM 2 MG: 1 INJECTION INTRAMUSCULAR; INTRAVENOUS at 09:09

## 2018-08-28 RX ADMIN — ALFENTANIL HYDROCHLORIDE 250 MCG: 500 INJECTION INTRAVENOUS at 09:11

## 2018-08-28 RX ADMIN — LIDOCAINE HYDROCHLORIDE 10 MG: 20 INJECTION, SOLUTION INFILTRATION; PERINEURAL at 09:14

## 2018-08-28 RX ADMIN — PROPOFOL 10 MG: 10 INJECTION, EMULSION INTRAVENOUS at 09:14

## 2018-08-28 RX ADMIN — SODIUM CHLORIDE, POTASSIUM CHLORIDE, SODIUM LACTATE AND CALCIUM CHLORIDE: 600; 310; 30; 20 INJECTION, SOLUTION INTRAVENOUS at 07:55

## 2018-08-28 RX ADMIN — ALFENTANIL HYDROCHLORIDE 250 MCG: 500 INJECTION INTRAVENOUS at 09:14

## 2018-08-28 ASSESSMENT — PULMONARY FUNCTION TESTS
PIF_VALUE: 0

## 2018-08-28 ASSESSMENT — LIFESTYLE VARIABLES: SMOKING_STATUS: 1

## 2018-08-28 ASSESSMENT — PAIN SCALES - GENERAL
PAINLEVEL_OUTOF10: 0
PAINLEVEL_OUTOF10: 0
PAINLEVEL_OUTOF10: 2

## 2018-08-28 ASSESSMENT — PAIN - FUNCTIONAL ASSESSMENT: PAIN_FUNCTIONAL_ASSESSMENT: 0-10

## 2018-08-28 ASSESSMENT — PAIN DESCRIPTION - DESCRIPTORS: DESCRIPTORS: ACHING;DISCOMFORT

## 2018-08-28 NOTE — ANESTHESIA POSTPROCEDURE EVALUATION
Department of Anesthesiology  Postprocedure Note    Patient: Maria Teresa Wu  MRN: [id number]  YOB: 1955  Date of evaluation: 8/28/2018  Time:  9:54 AM     Procedure Summary     Date:  08/28/18 Room / Location:  [address] [place] [address] 04 / 315 [facility]    Anesthesia Start:  0907 Anesthesia Stop:  0926    Procedure:  LUMBAR TRANSFORAMINAL L4-5 (Bilateral ) Diagnosis:  (SPONDYLOSIS WITHOUT MYELOPATHY OR RADICULOPATHY, LUMBAR REGION)    Surgeon:  Olamide Hollingsworth DO Responsible Provider:  Nico Nicholson MD    Anesthesia Type:  MAC ASA Status:  3          Anesthesia Type: MAC    Gina Phase I: Gina Score: 10    Gina Phase II: Gina Score: 10    Last vitals: Reviewed and per EMR flowsheets.        Anesthesia Post Evaluation    Patient location during evaluation: bedside  Patient participation: complete - patient participated  Level of consciousness: awake  Pain score: 2  Airway patency: patent  Nausea & Vomiting: no nausea  Complications: no  Cardiovascular status: blood pressure returned to baseline  Respiratory status: acceptable  Hydration status: euvolemic

## 2018-08-28 NOTE — ANESTHESIA PRE PROCEDURE
2005    2 neck surgeries & 1 -LB surgery       Social History:    Social History   Substance Use Topics    Smoking status: Current Every Day Smoker     Packs/day: 1.00     Years: 10.00     Types: Cigarettes    Smokeless tobacco: Never Used    Alcohol use No                                Ready to quit: Not Answered  Counseling given: Not Answered      Vital Signs (Current):   Vitals:    08/27/18 0950 08/28/18 0742   BP:  113/68   Pulse:  105   Resp:  14   Temp:  98 °F (36.7 °C)   SpO2:  95%   Weight: 225 lb (102.1 kg) 234 lb (106.1 kg)   Height: 5' 5.5\" (1.664 m) 5' 5.5\" (1.664 m)                                              BP Readings from Last 3 Encounters:   08/28/18 113/68   07/24/18 136/72   07/24/18 126/84       NPO Status: Time of last liquid consumption: 2000                        Time of last solid consumption: 2000                        Date of last liquid consumption: 08/27/18                        Date of last solid food consumption: 08/27/18    BMI:   Wt Readings from Last 3 Encounters:   08/28/18 234 lb (106.1 kg)   07/24/18 227 lb (103 kg)   05/01/18 238 lb (108 kg)     Body mass index is 38.35 kg/m². CBC: No results found for: WBC, RBC, HGB, HCT, MCV, RDW, PLT    CMP: No results found for: NA, K, CL, CO2, BUN, CREATININE, GFRAA, AGRATIO, LABGLOM, GLUCOSE, PROT, CALCIUM, BILITOT, ALKPHOS, AST, ALT    POC Tests: No results for input(s): POCGLU, POCNA, POCK, POCCL, POCBUN, POCHEMO, POCHCT in the last 72 hours.     Coags: No results found for: PROTIME, INR, APTT    HCG (If Applicable): No results found for: PREGTESTUR, PREGSERUM, HCG, HCGQUANT     ABGs: No results found for: PHART, PO2ART, ITB8WGC, NVI9MCO, BEART, S2HHRUCV     Type & Screen (If Applicable):  No results found for: LABABO, LABRH    Anesthesia Evaluation    Airway: Mallampati: III  TM distance: >3 FB   Neck ROM: full  Mouth opening: > = 3 FB Dental: normal exam         Pulmonary:normal exam    (+) asthma: current

## 2018-09-10 ENCOUNTER — ANESTHESIA EVENT (OUTPATIENT)
Dept: OPERATING ROOM | Age: 63
End: 2018-09-10
Payer: MEDICAID

## 2018-09-10 ASSESSMENT — LIFESTYLE VARIABLES: SMOKING_STATUS: 1

## 2018-09-10 NOTE — ANESTHESIA PRE PROCEDURE
daily. 30 tablet 5    lamoTRIgine (LAMICTAL) 200 MG tablet Take 200 mg by mouth daily       LORazepam (ATIVAN) 1 MG tablet Take 1 mg by mouth every 8 hours as needed.  QUEtiapine (SEROQUEL) 100 MG tablet Take 800 mg by mouth nightly       hydrocortisone 2.5 % cream Apply  topically as needed. Apply topically 2 times daily. No current facility-administered medications for this visit. Allergies:     Allergies   Allergen Reactions    Latex      With condom     Benadryl [Diphenhydramine]      Does the opposite      Sulfa Antibiotics      As a child    Vistaril [Hydroxyzine Hcl]      \"does the complete opposite\"       Problem List:    Patient Active Problem List   Diagnosis Code    Neck pain M54.2    Back pain M54.9    Cerebral atherosclerosis I67.2    Cervical disc displacement M50.20    Cervical radiculopathy M54.12    Disc displacement, lumbar M51.26    Lumbar radiculopathy M54.16    Peripheral neuropathy G62.9    Chronic pain of left knee M25.562, G89.29    Sacroiliitis  M46.1    Sacral spondylosis M47.818       Past Medical History:        Diagnosis Date    Asthma     [facility] [address]     Cancer (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 Fawn Mejia    Chronic back pain     upper, mid and lower back    Dementia     Depression     bipolar    H/O being hospitalized     for cervical, thoracic, and lumbar pain, at [address] H/O colonoscopy     Head injury two years ago    Headache(784.0)     8/10 severity    Hyperlipidemia     Memory difficulties     mispelling words, dialing wrong numbers    Migraine     10/10 severity, 6 per year    Numbness and tingling     both arms    OCD (obsessive compulsive disorder)        Past Surgical History:        Procedure Laterality Date    HYSTERECTOMY      partial   [address] Bilateral 8/28/2018    LUMBAR Alan Qureshi in the last 72 hours. Coags: No results found for: PROTIME, INR, APTT    HCG (If Applicable): No results found for: PREGTESTUR, PREGSERUM, HCG, HCGQUANT     ABGs: No results found for: PHART, PO2ART, VNA8IOD, WQY3LSJ, BEART, Y2UJNAIX     Type & Screen (If Applicable):  No results found for: LABABO, [address]    Anesthesia Evaluation  Patient summary reviewed no history of anesthetic complications:   Airway: Mallampati: III  TM distance: >3 FB   Neck ROM: limited  Mouth opening: > = 3 FB Dental: normal exam     Comment: Stained teeth    Pulmonary: breath sounds clear to auscultation  (+) asthma: current smoker          Patient smoked on day of surgery. ROS comment: Current Smoker 1 ppd. Cardiovascular:Negative CV ROS  Exercise tolerance: good (>4 METS),           Rhythm: regular  Rate: normal                    Neuro/Psych:   (+) neuromuscular disease:, headaches (H/O Head injury.): migraine headaches, psychiatric history:             ROS comment: Chronic Back Problems  Numbness and tingling. Memory problems. GI/Hepatic/Renal: Neg GI/Hepatic/Renal ROS            Endo/Other: Negative Endo/Other ROS   (+) : arthritis:., .                  ROS comment: Cancer top left wrist and right hand. Abdominal:   (+) obese,         Vascular: negative vascular ROS. Anesthesia Plan      MAC     ASA 3       Induction: intravenous. Anesthetic plan and risks discussed with patient. Plan discussed with CRNA. PAT Chart Review:  Chart reviewed per routine on August 27, 2018 at 11:20 AM by Adam Manuel MD.  Above represents information available via shared medical record including previous anesthesia history, drug and allergy history.   Confirmation of above and final plan per Day of Surgery (DOS) anesthesiologist.      Shanae Josue MD   9/10/2018

## 2018-09-11 ENCOUNTER — HOSPITAL ENCOUNTER (OUTPATIENT)
Age: 63
Setting detail: OUTPATIENT SURGERY
Discharge: HOME | End: 2018-09-11
Attending: PHYSICAL MEDICINE & REHABILITATION | Admitting: PHYSICAL MEDICINE & REHABILITATION
Payer: MEDICAID

## 2018-09-11 ENCOUNTER — ANESTHESIA (OUTPATIENT)
Dept: OPERATING ROOM | Age: 63
End: 2018-09-11
Payer: MEDICAID

## 2018-09-11 ENCOUNTER — HOSPITAL ENCOUNTER (OUTPATIENT)
Dept: OPERATING ROOM | Age: 63
Setting detail: OUTPATIENT SURGERY
Discharge: HOME | End: 2018-09-11
Attending: PHYSICAL MEDICINE & REHABILITATION
Payer: MEDICAID

## 2018-09-11 VITALS
BODY MASS INDEX: 38.82 KG/M2 | WEIGHT: 233 LBS | OXYGEN SATURATION: 96 % | SYSTOLIC BLOOD PRESSURE: 156 MMHG | HEART RATE: 92 BPM | RESPIRATION RATE: 16 BRPM | DIASTOLIC BLOOD PRESSURE: 87 MMHG | HEIGHT: 65 IN | TEMPERATURE: 98.6 F

## 2018-09-11 VITALS
OXYGEN SATURATION: 100 % | RESPIRATION RATE: 10 BRPM | TEMPERATURE: 98.6 F | DIASTOLIC BLOOD PRESSURE: 106 MMHG | SYSTOLIC BLOOD PRESSURE: 137 MMHG

## 2018-09-11 DIAGNOSIS — M51.36 DDD (DEGENERATIVE DISC DISEASE), LUMBAR: ICD-10-CM

## 2018-09-11 PROCEDURE — 7100000011 HC PHASE II RECOVERY - ADDTL 15 MIN: Performed by: PHYSICAL MEDICINE & REHABILITATION

## 2018-09-11 PROCEDURE — 2580000003 HC RX 258: Performed by: PHYSICAL MEDICINE & REHABILITATION

## 2018-09-11 PROCEDURE — 6360000004 HC RX CONTRAST MEDICATION: Performed by: PHYSICAL MEDICINE & REHABILITATION

## 2018-09-11 PROCEDURE — 2580000003 HC RX 258: Performed by: ANESTHESIOLOGY

## 2018-09-11 PROCEDURE — 2709999900 HC NON-CHARGEABLE SUPPLY: Performed by: PHYSICAL MEDICINE & REHABILITATION

## 2018-09-11 PROCEDURE — 3209999900 FLUORO FOR SURGICAL PROCEDURES

## 2018-09-11 PROCEDURE — 7100000010 HC PHASE II RECOVERY - FIRST 15 MIN: Performed by: PHYSICAL MEDICINE & REHABILITATION

## 2018-09-11 PROCEDURE — 3600000005 HC SURGERY LEVEL 5 BASE: Performed by: PHYSICAL MEDICINE & REHABILITATION

## 2018-09-11 PROCEDURE — 2500000003 HC RX 250 WO HCPCS: Performed by: PHYSICAL MEDICINE & REHABILITATION

## 2018-09-11 PROCEDURE — 6360000002 HC RX W HCPCS: Performed by: PHYSICAL MEDICINE & REHABILITATION

## 2018-09-11 PROCEDURE — 3700000000 HC ANESTHESIA ATTENDED CARE: Performed by: PHYSICAL MEDICINE & REHABILITATION

## 2018-09-11 PROCEDURE — 2500000003 HC RX 250 WO HCPCS: Performed by: NURSE ANESTHETIST, CERTIFIED REGISTERED

## 2018-09-11 PROCEDURE — 6360000002 HC RX W HCPCS: Performed by: NURSE ANESTHETIST, CERTIFIED REGISTERED

## 2018-09-11 RX ORDER — LABETALOL HYDROCHLORIDE 5 MG/ML
5 INJECTION, SOLUTION INTRAVENOUS EVERY 10 MIN PRN
Status: DISCONTINUED | OUTPATIENT
Start: 2018-09-11 | End: 2018-09-11 | Stop reason: HOSPADM

## 2018-09-11 RX ORDER — HYDROCODONE BITARTRATE AND ACETAMINOPHEN 5; 325 MG/1; MG/1
1 TABLET ORAL PRN
Status: DISCONTINUED | OUTPATIENT
Start: 2018-09-11 | End: 2018-09-11 | Stop reason: HOSPADM

## 2018-09-11 RX ORDER — LIDOCAINE HYDROCHLORIDE 20 MG/ML
INJECTION, SOLUTION INFILTRATION; PERINEURAL PRN
Status: DISCONTINUED | OUTPATIENT
Start: 2018-09-11 | End: 2018-09-11 | Stop reason: SDUPTHER

## 2018-09-11 RX ORDER — PROPOFOL 10 MG/ML
INJECTION, EMULSION INTRAVENOUS PRN
Status: DISCONTINUED | OUTPATIENT
Start: 2018-09-11 | End: 2018-09-11 | Stop reason: SDUPTHER

## 2018-09-11 RX ORDER — SODIUM CHLORIDE, SODIUM LACTATE, POTASSIUM CHLORIDE, CALCIUM CHLORIDE 600; 310; 30; 20 MG/100ML; MG/100ML; MG/100ML; MG/100ML
INJECTION, SOLUTION INTRAVENOUS CONTINUOUS
Status: DISCONTINUED | OUTPATIENT
Start: 2018-09-11 | End: 2018-09-11 | Stop reason: HOSPADM

## 2018-09-11 RX ORDER — HYDRALAZINE HYDROCHLORIDE 20 MG/ML
5 INJECTION INTRAMUSCULAR; INTRAVENOUS EVERY 10 MIN PRN
Status: DISCONTINUED | OUTPATIENT
Start: 2018-09-11 | End: 2018-09-11 | Stop reason: HOSPADM

## 2018-09-11 RX ORDER — PROMETHAZINE HYDROCHLORIDE 25 MG/ML
25 INJECTION, SOLUTION INTRAMUSCULAR; INTRAVENOUS
Status: DISCONTINUED | OUTPATIENT
Start: 2018-09-11 | End: 2018-09-11 | Stop reason: HOSPADM

## 2018-09-11 RX ORDER — FENTANYL CITRATE 50 UG/ML
50 INJECTION, SOLUTION INTRAMUSCULAR; INTRAVENOUS EVERY 5 MIN PRN
Status: DISCONTINUED | OUTPATIENT
Start: 2018-09-11 | End: 2018-09-11 | Stop reason: HOSPADM

## 2018-09-11 RX ORDER — MEPERIDINE HYDROCHLORIDE 25 MG/ML
12.5 INJECTION INTRAMUSCULAR; INTRAVENOUS; SUBCUTANEOUS EVERY 5 MIN PRN
Status: DISCONTINUED | OUTPATIENT
Start: 2018-09-11 | End: 2018-09-11 | Stop reason: HOSPADM

## 2018-09-11 RX ORDER — ALFENTANIL HYDROCHLORIDE 500 UG/ML
INJECTION INTRAVENOUS PRN
Status: DISCONTINUED | OUTPATIENT
Start: 2018-09-11 | End: 2018-09-11 | Stop reason: SDUPTHER

## 2018-09-11 RX ORDER — MIDAZOLAM HYDROCHLORIDE 1 MG/ML
INJECTION INTRAMUSCULAR; INTRAVENOUS PRN
Status: DISCONTINUED | OUTPATIENT
Start: 2018-09-11 | End: 2018-09-11 | Stop reason: SDUPTHER

## 2018-09-11 RX ORDER — MORPHINE SULFATE 2 MG/ML
1 INJECTION, SOLUTION INTRAMUSCULAR; INTRAVENOUS EVERY 5 MIN PRN
Status: DISCONTINUED | OUTPATIENT
Start: 2018-09-11 | End: 2018-09-11 | Stop reason: HOSPADM

## 2018-09-11 RX ORDER — HYDROCODONE BITARTRATE AND ACETAMINOPHEN 5; 325 MG/1; MG/1
2 TABLET ORAL PRN
Status: DISCONTINUED | OUTPATIENT
Start: 2018-09-11 | End: 2018-09-11 | Stop reason: HOSPADM

## 2018-09-11 RX ORDER — DIPHENHYDRAMINE HYDROCHLORIDE 50 MG/ML
12.5 INJECTION INTRAMUSCULAR; INTRAVENOUS
Status: DISCONTINUED | OUTPATIENT
Start: 2018-09-11 | End: 2018-09-11 | Stop reason: HOSPADM

## 2018-09-11 RX ORDER — LIDOCAINE HYDROCHLORIDE 20 MG/ML
INJECTION, SOLUTION EPIDURAL; INFILTRATION; INTRACAUDAL; PERINEURAL PRN
Status: DISCONTINUED | OUTPATIENT
Start: 2018-09-11 | End: 2018-09-11 | Stop reason: HOSPADM

## 2018-09-11 RX ADMIN — SODIUM CHLORIDE, POTASSIUM CHLORIDE, SODIUM LACTATE AND CALCIUM CHLORIDE: 600; 310; 30; 20 INJECTION, SOLUTION INTRAVENOUS at 09:41

## 2018-09-11 RX ADMIN — ALFENTANIL HYDROCHLORIDE 250 MCG: 500 INJECTION INTRAVENOUS at 10:44

## 2018-09-11 RX ADMIN — MIDAZOLAM 2 MG: 1 INJECTION INTRAMUSCULAR; INTRAVENOUS at 10:44

## 2018-09-11 RX ADMIN — LIDOCAINE HYDROCHLORIDE 10 MG: 20 INJECTION, SOLUTION INFILTRATION; PERINEURAL at 10:44

## 2018-09-11 RX ADMIN — ALFENTANIL HYDROCHLORIDE 250 MCG: 500 INJECTION INTRAVENOUS at 10:46

## 2018-09-11 RX ADMIN — ALFENTANIL HYDROCHLORIDE 250 MCG: 500 INJECTION INTRAVENOUS at 10:50

## 2018-09-11 RX ADMIN — PROPOFOL 40 MG: 10 INJECTION, EMULSION INTRAVENOUS at 10:44

## 2018-09-11 RX ADMIN — ALFENTANIL HYDROCHLORIDE 250 MCG: 500 INJECTION INTRAVENOUS at 10:47

## 2018-09-11 ASSESSMENT — PULMONARY FUNCTION TESTS
PIF_VALUE: 0
PIF_VALUE: 1
PIF_VALUE: 0

## 2018-09-11 ASSESSMENT — PAIN DESCRIPTION - ORIENTATION: ORIENTATION: LOWER;LEFT

## 2018-09-11 ASSESSMENT — PAIN DESCRIPTION - DESCRIPTORS
DESCRIPTORS: ACHING
DESCRIPTORS: ACHING

## 2018-09-11 ASSESSMENT — PAIN DESCRIPTION - PAIN TYPE: TYPE: CHRONIC PAIN

## 2018-09-11 ASSESSMENT — PAIN SCALES - GENERAL: PAINLEVEL_OUTOF10: 3

## 2018-09-11 ASSESSMENT — PAIN DESCRIPTION - LOCATION: LOCATION: BACK

## 2018-09-11 ASSESSMENT — PAIN DESCRIPTION - FREQUENCY: FREQUENCY: INTERMITTENT

## 2018-09-11 ASSESSMENT — PAIN - FUNCTIONAL ASSESSMENT: PAIN_FUNCTIONAL_ASSESSMENT: 0-10

## 2019-03-12 PROBLEM — M48.061 NEURAL FORAMINAL STENOSIS OF LUMBAR SPINE: Chronic | Status: ACTIVE | Noted: 2019-03-12

## 2019-04-04 RX ORDER — BUPROPION HYDROCHLORIDE 100 MG/1
100 TABLET ORAL DAILY
COMMUNITY
End: 2019-05-22

## 2019-04-08 ENCOUNTER — ANESTHESIA EVENT (OUTPATIENT)
Dept: OPERATING ROOM | Age: 64
End: 2019-04-08
Payer: MEDICAID

## 2019-04-08 ASSESSMENT — LIFESTYLE VARIABLES: SMOKING_STATUS: 1

## 2019-04-08 NOTE — ANESTHESIA PRE PROCEDURE
tablet by mouth daily       PRISTIQ 100 MG TB24 extended release tablet Take 150 mg by mouth daily       acetaminophen (TYLENOL) 500 MG tablet Take 500 mg by mouth every 6 hours as needed for Pain Indications: Four 500mg QD      atorvastatin (LIPITOR) 20 MG tablet Take 1 tablet by mouth daily. (Patient taking differently: Take 40 mg by mouth daily ) 30 tablet 5    lamoTRIgine (LAMICTAL) 200 MG tablet Take 200 mg by mouth daily       LORazepam (ATIVAN) 1 MG tablet Take 1 mg by mouth every 8 hours as needed.  QUEtiapine (SEROQUEL) 100 MG tablet Take 800 mg by mouth nightly       hydrocortisone 2.5 % cream Apply  topically as needed. Apply topically 2 times daily. No current facility-administered medications for this visit. Allergies:     Allergies   Allergen Reactions    Latex      With condom     Benadryl [Diphenhydramine]      Does the opposite      Sulfa Antibiotics      As a child    Vistaril [Hydroxyzine Hcl]      \"does the complete opposite\"       Problem List:    Patient Active Problem List   Diagnosis Code    Neck pain M54.2    Back pain M54.9    Cerebral atherosclerosis I67.2    Cervical disc displacement M50.20    Cervical radiculopathy M54.12    Disc displacement, lumbar M51.26    Lumbar radiculopathy M54.16    Peripheral neuropathy G62.9    Chronic pain of left knee M25.562, G89.29    Sacroiliitis  M46.1    Sacral spondylosis M47.818    Neural foraminal stenosis of lumbar spine M99.83       Past Medical History:        Diagnosis Date    Asthma     B[facility] 1 disorder ([facility]     Cancer ([facility]     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 Zoie Chang    Chronic back pain     upper, mid and lower back    Dementia     Depression     bipolar    H/O being hospitalized     for cervical, thoracic, and lumbar pain, at [address] H/O colonoscopy     Head injury two years ago    Headache(784.0)     8/10 severity    BP Readings from Last 3 Encounters:   09/11/18 (!) 137/106   09/11/18 (!) 156/87   08/28/18 99/65       NPO Status:                                                                      BMI:   Wt Readings from Last 3 Encounters:   09/11/18 233 lb (105.7 kg)   08/28/18 234 lb (106.1 kg)   07/24/18 227 lb (103 kg)     There is no height or weight on file to calculate BMI.    CBC: No results found for: WBC, RBC, HGB, HCT, MCV, RDW, PLT    CMP: No results found for: NA, K, CL, CO2, BUN, CREATININE, GFRAA, AGRATIO, LABGLOM, GLUCOSE, PROT, CALCIUM, BILITOT, ALKPHOS, AST, ALT    POC Tests: No results for input(s): POCGLU, POCNA, POCK, POCCL, POCBUN, POCHEMO, POCHCT in the last 72 hours. Coags: No results found for: PROTIME, INR, APTT    HCG (If Applicable): No results found for: PREGTESTUR, PREGSERUM, HCG, HCGQUANT     ABGs: No results found for: PHART, PO2ART, LDD7PLB, YMR3KAY, BEART, U2MWIPPZ     Type & Screen (If Applicable):  No results found for: LABABO, [address]    Anesthesia Evaluation  Patient summary reviewed no history of anesthetic complications:   Airway: Mallampati: III  TM distance: >3 FB   Neck ROM: limited  Mouth opening: > = 3 FB Dental: normal exam     Comment: Stained teeth    Pulmonary: breath sounds clear to auscultation  (+) asthma: current smoker          Patient smoked on day of surgery. ROS comment: Current Smoker 1 ppd. Cardiovascular:Negative CV ROS  Exercise tolerance: good (>4 METS),           Rhythm: regular  Rate: normal                    Neuro/Psych:   (+) neuromuscular disease:, headaches (H/O Head injury.): migraine headaches, psychiatric history:             ROS comment: Chronic Back Problems  Numbness and tingling. Memory problems. GI/Hepatic/Renal: Neg GI/Hepatic/Renal ROS            Endo/Other: Negative Endo/Other ROS   (+) : arthritis:., .                  ROS comment: Cancer top left wrist and right hand.  Abdominal:   (+) obese,         Vascular: negative vascular ROS. Anesthesia Plan      MAC     ASA 3       Induction: intravenous. Anesthetic plan and risks discussed with patient. Plan discussed with CRNA. PAT Chart Review:  Chart reviewed per routine on August 27, 2018 at 11:20 AM by Keny Curran MD.  Above represents information available via shared medical record including previous anesthesia history, drug and allergy history. Confirmation of above and final plan per Day of Surgery (DOS) anesthesiologist.      Trisha Joseph MD   4/8/2019    Pt seen questions answered plan outlined H&P reviewed accepts MAC. Dk Otoole M.D 04/09/2019 1056.

## 2019-04-09 ENCOUNTER — ANESTHESIA (OUTPATIENT)
Dept: OPERATING ROOM | Age: 64
End: 2019-04-09
Payer: MEDICAID

## 2019-04-09 ENCOUNTER — HOSPITAL ENCOUNTER (OUTPATIENT)
Age: 64
Setting detail: OUTPATIENT SURGERY
Discharge: HOME | End: 2019-04-09
Attending: PHYSICAL MEDICINE & REHABILITATION | Admitting: PHYSICAL MEDICINE & REHABILITATION
Payer: MEDICAID

## 2019-04-09 ENCOUNTER — HOSPITAL ENCOUNTER (OUTPATIENT)
Dept: OPERATING ROOM | Age: 64
Discharge: HOME | End: 2019-04-09
Payer: MEDICAID

## 2019-04-09 VITALS
HEART RATE: 79 BPM | OXYGEN SATURATION: 98 % | BODY MASS INDEX: 36.99 KG/M2 | DIASTOLIC BLOOD PRESSURE: 85 MMHG | TEMPERATURE: 98 F | RESPIRATION RATE: 16 BRPM | WEIGHT: 222 LBS | HEIGHT: 65 IN | SYSTOLIC BLOOD PRESSURE: 151 MMHG

## 2019-04-09 VITALS
OXYGEN SATURATION: 100 % | DIASTOLIC BLOOD PRESSURE: 116 MMHG | SYSTOLIC BLOOD PRESSURE: 185 MMHG | RESPIRATION RATE: 7 BRPM

## 2019-04-09 DIAGNOSIS — M51.36 DDD (DEGENERATIVE DISC DISEASE), LUMBAR: ICD-10-CM

## 2019-04-09 PROCEDURE — 7100000011 HC PHASE II RECOVERY - ADDTL 15 MIN: Performed by: PHYSICAL MEDICINE & REHABILITATION

## 2019-04-09 PROCEDURE — 2500000003 HC RX 250 WO HCPCS: Performed by: PHYSICAL MEDICINE & REHABILITATION

## 2019-04-09 PROCEDURE — 2709999900 HC NON-CHARGEABLE SUPPLY: Performed by: PHYSICAL MEDICINE & REHABILITATION

## 2019-04-09 PROCEDURE — 3600000005 HC SURGERY LEVEL 5 BASE: Performed by: PHYSICAL MEDICINE & REHABILITATION

## 2019-04-09 PROCEDURE — 3700000000 HC ANESTHESIA ATTENDED CARE: Performed by: PHYSICAL MEDICINE & REHABILITATION

## 2019-04-09 PROCEDURE — 6360000002 HC RX W HCPCS: Performed by: NURSE ANESTHETIST, CERTIFIED REGISTERED

## 2019-04-09 PROCEDURE — 2500000003 HC RX 250 WO HCPCS: Performed by: NURSE ANESTHETIST, CERTIFIED REGISTERED

## 2019-04-09 PROCEDURE — 2580000003 HC RX 258: Performed by: ANESTHESIOLOGY

## 2019-04-09 PROCEDURE — 6360000004 HC RX CONTRAST MEDICATION: Performed by: PHYSICAL MEDICINE & REHABILITATION

## 2019-04-09 PROCEDURE — 7100000010 HC PHASE II RECOVERY - FIRST 15 MIN: Performed by: PHYSICAL MEDICINE & REHABILITATION

## 2019-04-09 PROCEDURE — 3209999900 FLUORO FOR SURGICAL PROCEDURES

## 2019-04-09 RX ORDER — SODIUM CHLORIDE, SODIUM LACTATE, POTASSIUM CHLORIDE, CALCIUM CHLORIDE 600; 310; 30; 20 MG/100ML; MG/100ML; MG/100ML; MG/100ML
INJECTION, SOLUTION INTRAVENOUS CONTINUOUS
Status: DISCONTINUED | OUTPATIENT
Start: 2019-04-09 | End: 2019-04-09 | Stop reason: HOSPADM

## 2019-04-09 RX ORDER — PROPOFOL 10 MG/ML
INJECTION, EMULSION INTRAVENOUS PRN
Status: DISCONTINUED | OUTPATIENT
Start: 2019-04-09 | End: 2019-04-09 | Stop reason: SDUPTHER

## 2019-04-09 RX ORDER — MIDAZOLAM HYDROCHLORIDE 1 MG/ML
INJECTION INTRAMUSCULAR; INTRAVENOUS PRN
Status: DISCONTINUED | OUTPATIENT
Start: 2019-04-09 | End: 2019-04-09 | Stop reason: SDUPTHER

## 2019-04-09 RX ORDER — ALFENTANIL HYDROCHLORIDE 500 UG/ML
INJECTION INTRAVENOUS PRN
Status: DISCONTINUED | OUTPATIENT
Start: 2019-04-09 | End: 2019-04-09 | Stop reason: SDUPTHER

## 2019-04-09 RX ORDER — LIDOCAINE HYDROCHLORIDE 10 MG/ML
INJECTION, SOLUTION EPIDURAL; INFILTRATION; INTRACAUDAL; PERINEURAL PRN
Status: DISCONTINUED | OUTPATIENT
Start: 2019-04-09 | End: 2019-04-09 | Stop reason: ALTCHOICE

## 2019-04-09 RX ORDER — LIDOCAINE HYDROCHLORIDE 20 MG/ML
INJECTION, SOLUTION INFILTRATION; PERINEURAL PRN
Status: DISCONTINUED | OUTPATIENT
Start: 2019-04-09 | End: 2019-04-09 | Stop reason: SDUPTHER

## 2019-04-09 RX ADMIN — ALFENTANIL HYDROCHLORIDE 250 MCG: 500 INJECTION INTRAVENOUS at 11:27

## 2019-04-09 RX ADMIN — MIDAZOLAM 1 MG: 1 INJECTION INTRAMUSCULAR; INTRAVENOUS at 11:25

## 2019-04-09 RX ADMIN — PROPOFOL 20 MG: 10 INJECTION, EMULSION INTRAVENOUS at 11:29

## 2019-04-09 RX ADMIN — SODIUM CHLORIDE, POTASSIUM CHLORIDE, SODIUM LACTATE AND CALCIUM CHLORIDE: 600; 310; 30; 20 INJECTION, SOLUTION INTRAVENOUS at 10:49

## 2019-04-09 RX ADMIN — ALFENTANIL HYDROCHLORIDE 250 MCG: 500 INJECTION INTRAVENOUS at 11:25

## 2019-04-09 RX ADMIN — MIDAZOLAM 1 MG: 1 INJECTION INTRAMUSCULAR; INTRAVENOUS at 11:28

## 2019-04-09 RX ADMIN — LIDOCAINE HYDROCHLORIDE 50 MG: 20 INJECTION, SOLUTION INFILTRATION; PERINEURAL at 11:27

## 2019-04-09 RX ADMIN — PROPOFOL 30 MG: 10 INJECTION, EMULSION INTRAVENOUS at 11:28

## 2019-04-09 ASSESSMENT — PULMONARY FUNCTION TESTS
PIF_VALUE: 0

## 2019-04-09 ASSESSMENT — PAIN SCALES - GENERAL
PAINLEVEL_OUTOF10: 0

## 2019-04-09 ASSESSMENT — PAIN DESCRIPTION - DESCRIPTORS: DESCRIPTORS: ACHING;DISCOMFORT

## 2019-04-09 ASSESSMENT — PAIN - FUNCTIONAL ASSESSMENT: PAIN_FUNCTIONAL_ASSESSMENT: 0-10

## 2019-04-09 NOTE — OP NOTE
Elena Sales    4/9/2019      PRE-OPERATIVE DIAGNOSIS:  Lumbar disc displacement. Lumbar neural foraminal stenosis. POST-OPERATIVE DIAGNOSIS:  Same    PROCEDURE:  Left transforaminal epidural steroid injection, foraminal level L4-5 (#1)    SURGEON:    Ashok Chung D.O. ANESTHESIA:   Local with monitored anesthesia care    ESTIMATED BLOOD LOSS:  None.  ______________________________________________________________________  BRIEF HISTORY:  Elena Sales comes in today for the first  therapeutic transforaminal epidural steroid injections. The potential complications of this procedure were discussed with her again today. She has elected to undergo the aforementioned procedure. Nancy complete History & Physical examination were reviewed in depth, a copy of which is in the chart. PROCEDURE:  After confirming written and informed consent, the patient was brought to the procedure room and placed in the prone position. Blood pressure, heart rate, O2 saturation, and visual and verbal monitoring were established. A time-out was performed and her name and date of birth, the procedure to be performed as well as the plan for the location of the needle insertion were confirmed. Fluoroscopy was utilized to delineate the anatomy of the lumbar spine. Surface landmarks were identified as well. After the back was prepped and draped, an oblique fluoroscopic view was created to optimize access to the neuroforamen. After infiltration of 2 ml, 1% Xylocaine, a #22-gauge, 3.5-inch-regional block needle was passed to position the tip in the foramen. Satisfactory needle placement was confirmed by fluoroscopy. The tip of the needle was positioned near the 6 oclock position in relation to the pedicle on AP view and in the cephalad aspect of the foramen on lateral view. Negative aspiration was noted. An injection of 0.5 cc of Omnipaque 180 was injected under live fluoroscopy.    Negative aspiration

## 2019-04-09 NOTE — ANESTHESIA POSTPROCEDURE EVALUATION
Department of Anesthesiology  Postprocedure Note    Patient: Chaya Barrett  MRN: [id number]  YOB: 1955  Date of evaluation: 4/9/2019  Time:  12:09 PM     Procedure Summary     Date:  04/09/19 Room / Location:  [address] [place] [address] 04 / [facility]    Anesthesia Start:  9655 Anesthesia Stop:  1137    Procedure:  LEFT TRANSFORAMINAL LUMBAR NERVE BLOCK AT L4-5 UNDER X-RAY WITH IV SEDATION (Left ) Diagnosis:  (OTHER SPONDYLOSIS WITH RADICULOPATHY, CERVICAL REGION)    Surgeon:  Shruti Ann DO Responsible Provider:  Marek Isaac MD    Anesthesia Type:  MAC ASA Status:  3          Anesthesia Type: MAC    Gina Phase I: Gina Score: 10    Gina Phase II: Gina Score: 9    Last vitals: Reviewed and per EMR flowsheets.        Anesthesia Post Evaluation    Patient location during evaluation: PACU  Patient participation: complete - patient participated  Level of consciousness: awake  Pain score: 2  Airway patency: patent  Nausea & Vomiting: no nausea  Complications: no  Cardiovascular status: blood pressure returned to baseline  Respiratory status: acceptable  Hydration status: euvolemic

## 2019-04-09 NOTE — H&P
History and Physical    Patient's Name/Date of Birth: Chaya Barrett / 1955 (56 y.o.)    Date: April 9, 2019     Chief Complaint: low back pain    HPI: The patient presents today for left lumbar transforaminal nerve block. The patient reports constant aching pain in the lower back. The patient has exhausted conservative therapies and would like to undergo the procedure today as discussed.     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 Camden Tellez    Chronic back pain     upper, mid and lower back    Dementia     Depression     bipolar    H/O being hospitalized     for cervical, thoracic, and lumbar pain, at [address] H/O colonoscopy     Head injury two years ago    Headache(784.0)     8/10 severity    Hyperlipidemia     Memory difficulties     mispelling words, dialing wrong numbers    Migraine     10/10 severity, 6 per year    Numbness and tingling     both arms    OCD (obsessive compulsive disorder)        Past Surgical History:   Procedure Laterality Date    HYSTERECTOMY      partial   [address]    LUMBAR SPINE SURGERY Bilateral 8/28/2018    LUMBAR TRANSFORAMINAL L4-5 performed by Shruti Ann DO at [facility] Left 04/24/2018    left sacroiliac joint injection #1 with IV sedation    NERVE BLOCK Left 05/01/2018    left sacroiliac joint injection #2    NERVE BLOCK Left 07/24/2018    NERVE BLOCK Bilateral 08/28/2018    transforaminal injection    NERVE BLOCK  09/11/2018    [place] NERVOUS SYSTEM SURGERY UNLISTED Left 7/24/2018    LEFT SACROILIAC JOINT RADIOFREQUENCY LATERAL SACRAL NERVES S1 S2 S3 performed by Shruti Ann DO at [address] [place] [address] DX/THER SBST INTRLMNR LMBR/SAC W/IMG GDN N/A 9/11/2018    BILATERAL LUMBAR TRANSFORAMINAL NERVE BLOCK  #2 L4-5 WITH IV SEDATION performed by Shruti Ann, DO at [address] [facility] NOSE/CLEFT LIP/TIP Left 4/24/2018    LEFT SACROILIAC JOINT INJECTION #1 WITH IV SEDATION performed by Shruti Ann DO at [address] [facility] NOSE/CLEFT LIP/TIP Left 5/1/2018    LEFT SACROILIAC JOINT INJECTION #2 WITH IV SEDATION performed by Shruti Ann DO at [address] [facility]  2005    2 neck surgeries & 1 -LB surgery       Prior to Admission medications    Medication Sig Start Date End Date Taking? Authorizing Provider   buPROPion (WELLBUTRIN) 100 MG tablet Take 100 mg by mouth daily   Yes Historical Provider, MD   VENTOLIN  (90 BASE) MCG/ACT inhaler Inhale 1 puff into the lungs every 4 hours as needed  2/10/17  Yes Historical Provider, MD   Cholecalciferol (VITAMIN D3) 1000 UNITS TABS Take 1 tablet by mouth daily  2/10/17  Yes Historical Provider, MD   PRISTIQ 100 MG TB24 extended release tablet Take 150 mg by mouth daily  2/14/17  Yes Historical Provider, MD   acetaminophen (TYLENOL) 500 MG tablet Take 500 mg by mouth every 6 hours as needed for Pain Indications: Four 500mg QD   Yes Historical Provider, MD   atorvastatin (LIPITOR) 20 MG tablet Take 1 tablet by mouth daily. Patient taking differently: Take 40 mg by mouth daily  4/9/13  Yes Lis Sen MD   lamoTRIgine (LAMICTAL) 200 MG tablet Take 200 mg by mouth daily    Yes Historical Provider, MD   LORazepam (ATIVAN) 1 MG tablet Take 1 mg by mouth every 8 hours as needed. Yes Historical Provider, MD   QUEtiapine (SEROQUEL) 100 MG tablet Take 800 mg by mouth nightly    Yes Historical Provider, MD   hydrocortisone 2.5 % cream Apply  topically as needed. Apply topically 2 times daily. Yes Historical Provider, MD       Latex;  Benadryl [diphenhydramine]; Sulfa antibiotics; and Vistaril [hydroxyzine hcl]    Family History   Problem Relation Age of Onset    Dementia Mother     Alzheimer's Disease Mother     Parkinsonism Father     Brain Cancer Brother         tumor    Lung Cancer Brother        Social History     Socioeconomic History    Marital status: Single     Spouse name: Not on file    Number of children: Not on file    Years of education: Not on file    Highest education level: Not on file   Occupational History    Not on file   Social Needs    Financial resource strain: Not on file    Food insecurity:     Worry: Not on file     Inability: Not on file    Transportation needs:     Medical: Not on file     Non-medical: Not on file   Tobacco Use    Smoking status: Current Every Day Smoker     Packs/day: 1.00     Years: 10.00     Pack years: 10.00     Types: Cigarettes    Smokeless tobacco: Never Used   Substance and Sexual Activity    Alcohol use: No    Drug use: No    Sexual activity: Not on file   Lifestyle    Physical activity:     Days per week: Not on file     Minutes per session: Not on file    Stress: Not on file   Relationships    Social connections:     Talks on phone: Not on file     Gets together: Not on file     Attends Anglican service: Not on file     Active member of club or organization: Not on file     Attends meetings of clubs or organizations: Not on file     Relationship status: Not on file    Intimate partner violence:     Fear of current or ex partner: Not on file     Emotionally abused: Not on file     Physically abused: Not on file     Forced sexual activity: Not on file   Other Topics Concern    Not on file   Social History Narrative    Not on file       Review of Systems:  The patient denies chest pain, shortness of breath, bowel or bladder complaints, new neurologic complaints. All other review of systems negative.     Physical Exam:  Vitals:    04/04/19 1042 04/09/19 1029   BP:  (!) 175/86   Pulse:  88   Resp:  16   Temp:  98 °F (36.7 °C)   SpO2:  99%   Weight: 227 lb (103 kg) 222 lb (100.7 kg)   Height: 5' 5\" (1.651 m) 5' 5\" (1.651 m)       CONSTITUTIONAL:  Well nourished in no acute distress  HEENT: Normocephalic, atraumatic  BACK:  Limited ROM, muscle spasms noted, pain noted with flexion  LUNGS:  No increased work of breathing, good air exchange, clear to auscultation, no crackles or wheezing  CARDIOVASCULAR:  Regular rate  ABDOMEN:  Normal bowel sounds, soft, obese  GENITAL/URINARY:  Deferred  MUSCULOSKELETAL: Normal muscle bulk and tone  NEUROLOGIC:  Patient is awake and alert  PSYCH: Language linear and logical  SKIN:  No rashes or lesions noted    Assessment:  Lumbar neural foraminal stenosis  Lumbar disc displacement    Plan:  Proceed with the scheduled  Left lumbar transforaminal nerve block as discussed in detail with the patient today.     Electronically signed by Shruti Ann DO on 4/9/19 at 11:25 AM

## 2019-05-22 ENCOUNTER — OFFICE VISIT (OUTPATIENT)
Dept: PRIMARY CARE CLINIC | Age: 64
End: 2019-05-22
Payer: MEDICAID

## 2019-05-22 VITALS
OXYGEN SATURATION: 97 % | BODY MASS INDEX: 35.84 KG/M2 | HEIGHT: 66 IN | HEART RATE: 114 BPM | TEMPERATURE: 97.4 F | SYSTOLIC BLOOD PRESSURE: 132 MMHG | DIASTOLIC BLOOD PRESSURE: 84 MMHG | WEIGHT: 223 LBS

## 2019-05-22 DIAGNOSIS — Z86.59 MENTAL STATUS CHANGE RESOLVED: ICD-10-CM

## 2019-05-22 DIAGNOSIS — N39.0 URINARY TRACT INFECTION WITHOUT HEMATURIA, SITE UNSPECIFIED: ICD-10-CM

## 2019-05-22 DIAGNOSIS — E86.0 DEHYDRATION: ICD-10-CM

## 2019-05-22 PROCEDURE — 99214 OFFICE O/P EST MOD 30 MIN: CPT | Performed by: FAMILY MEDICINE

## 2019-05-22 PROCEDURE — 1111F DSCHRG MED/CURRENT MED MERGE: CPT | Performed by: FAMILY MEDICINE

## 2019-05-22 RX ORDER — ATORVASTATIN CALCIUM 40 MG/1
40 TABLET, FILM COATED ORAL DAILY
Qty: 30 TABLET | Refills: 3 | Status: SHIPPED | OUTPATIENT
Start: 2019-05-22 | End: 2019-10-01 | Stop reason: SDUPTHER

## 2019-05-22 RX ORDER — MELATONIN
1 DAILY
Qty: 30 TABLET | Refills: 3 | Status: SHIPPED | OUTPATIENT
Start: 2019-05-22 | End: 2019-10-30 | Stop reason: SDUPTHER

## 2019-05-22 RX ORDER — ACETAMINOPHEN 325 MG/1
TABLET ORAL
COMMUNITY
End: 2019-05-22

## 2019-05-22 RX ORDER — QUETIAPINE FUMARATE 400 MG/1
TABLET, FILM COATED ORAL
COMMUNITY
End: 2021-02-17 | Stop reason: ALTCHOICE

## 2019-05-22 RX ORDER — LANOLIN ALCOHOL/MO/W.PET/CERES
1000 CREAM (GRAM) TOPICAL DAILY
Qty: 30 TABLET | Refills: 3 | Status: SHIPPED | OUTPATIENT
Start: 2019-05-22 | End: 2019-10-10 | Stop reason: SDUPTHER

## 2019-05-22 RX ORDER — BUPROPION HYDROCHLORIDE 150 MG/1
TABLET ORAL
Refills: 1 | COMMUNITY
Start: 2019-05-11

## 2019-05-22 RX ORDER — TRIAMCINOLONE ACETONIDE 1 MG/G
CREAM TOPICAL
COMMUNITY
Start: 2018-02-28 | End: 2020-09-23 | Stop reason: SDUPTHER

## 2019-05-22 RX ORDER — ALBUTEROL SULFATE 90 UG/1
AEROSOL, METERED RESPIRATORY (INHALATION)
COMMUNITY
Start: 2017-01-05

## 2019-05-22 RX ORDER — LANOLIN ALCOHOL/MO/W.PET/CERES
CREAM (GRAM) TOPICAL
Refills: 0 | COMMUNITY
Start: 2019-05-15 | End: 2019-05-22 | Stop reason: SDUPTHER

## 2019-05-22 RX ORDER — QUETIAPINE FUMARATE 400 MG/1
TABLET, FILM COATED ORAL
Refills: 5 | COMMUNITY
Start: 2019-05-11

## 2019-05-22 RX ORDER — ATORVASTATIN CALCIUM 40 MG/1
TABLET, FILM COATED ORAL
Refills: 3 | COMMUNITY
Start: 2019-05-15 | End: 2019-05-22 | Stop reason: SDUPTHER

## 2019-05-22 ASSESSMENT — PATIENT HEALTH QUESTIONNAIRE - PHQ9
2. FEELING DOWN, DEPRESSED OR HOPELESS: 0
SUM OF ALL RESPONSES TO PHQ9 QUESTIONS 1 & 2: 0
SUM OF ALL RESPONSES TO PHQ QUESTIONS 1-9: 0
SUM OF ALL RESPONSES TO PHQ QUESTIONS 1-9: 0
1. LITTLE INTEREST OR PLEASURE IN DOING THINGS: 0

## 2019-05-22 NOTE — PROGRESS NOTES
hospitalized     for cervical, thoracic, and lumbar pain, at [address] H/O colonoscopy     Head injury two years ago    Headache(784.0)     8/10 severity    Hyperlipidemia     Memory difficulties     mispelling words, dialing wrong numbers    Migraine     10/10 severity, 6 per year    Numbness and tingling     both arms    OCD (obsessive compulsive disorder)      Social History     Socioeconomic History    Marital status: Single     Spouse name: Not on file    Number of children: Not on file    Years of education: Not on file    Highest education level: Not on file   Occupational History    Not on file   Social Needs    Financial resource strain: Not on file    Food insecurity:     Worry: Not on file     Inability: Not on file    Transportation needs:     Medical: Not on file     Non-medical: Not on file   Tobacco Use    Smoking status: Current Every Day Smoker     Packs/day: 1.00     Years: 10.00     Pack years: 10.00     Start date: 5/22/2007    Smokeless tobacco: Never Used   Substance and Sexual Activity    Alcohol use: No    Drug use: No    Sexual activity: Not on file   Lifestyle    Physical activity:     Days per week: Not on file     Minutes per session: Not on file    Stress: Not on file   Relationships    Social connections:     Talks on phone: Not on file     Gets together: Not on file     Attends Evangelical service: Not on file     Active member of club or organization: Not on file     Attends meetings of clubs or organizations: Not on file     Relationship status: Not on file    Intimate partner violence:     Fear of current or ex partner: Not on file     Emotionally abused: Not on file     Physically abused: Not on file     Forced sexual activity: Not on file   Other Topics Concern    Not on file   Social History Narrative    Not on file     Past Surgical History:   Procedure Laterality Date    ANESTHESIA NERVE BLOCK Left 4/9/2019    LEFT TRANSFORAMINAL LUMBAR NERVE BLOCK AT L4-5 UNDER X-RAY WITH IV SEDATION performed by Jose Armando Méndez DO at [address]      partial   [facility]    LUMBAR SPINE SURGERY Bilateral 8/28/2018    LUMBAR TRANSFORAMINAL L4-5 performed by Jose Armando Méndez DO at [facility] Left 04/24/2018    left sacroiliac joint injection #1 with IV sedation    NERVE BLOCK Left 05/01/2018    left sacroiliac joint injection #2    NERVE BLOCK Left 07/24/2018    NERVE BLOCK Bilateral 08/28/2018    transforaminal injection    NERVE BLOCK  09/11/2018    NERVE BLOCK Left 04/09/2019    with sedation    [place] NERVOUS SYSTEM SURGERY UNLISTED Left 7/24/2018    LEFT SACROILIAC JOINT RADIOFREQUENCY LATERAL SACRAL NERVES S1 S2 S3 performed by Jose Armando Méndez DO at [address] [facility] DX/THER SBST INTRLMNR LMBR/SAC W/IMG GDN N/A 9/11/2018    BILATERAL LUMBAR TRANSFORAMINAL NERVE BLOCK  #2 L4-5 WITH IV SEDATION performed by Jose Armando Méndez DO at [address] [facility] NOSE/CLEFT LIP/TIP Left 4/24/2018    LEFT SACROILIAC JOINT INJECTION #1 WITH IV SEDATION performed by Jose Armando Méndez DO at [address] [facility] NOSE/CLEFT LIP/TIP Left 5/1/2018    LEFT SACROILIAC JOINT INJECTION #2 WITH IV SEDATION performed by Jose Armando Méndez DO at [address] [address]    2 neck surgeries & 1 -LB surgery      Family History   Problem Relation Age of Onset    Dementia Mother    [facility]     Parkinsonism Father     Brain Cancer Brother         tumor    Lung Cancer Brother         Vitals:    05/22/19 0738   BP: 132/84   Site: Left Upper Arm   Position: Sitting   Cuff Size: Medium Adult   Pulse: 114   Temp: 97.4 °F (36.3 °C)   SpO2: 97%   Weight: 223 lb (101.2 kg)   Height: 5' 5.5\" (1.664 m)        Exam: Const: Appears healthy and well developed. No signs of acute distress present. Eyes: PERRL  ENMT: Tympanic membranes are intact.   Nasal mucosa intact without noted erythema Septum is in the midline. Posterior pharynx shows no exudate, irritation or redness. Neck:  Supple without adenopathy. Adequate range of motion   Resp: No rales, rhonchi, wheezes appreciated over the lungs bilaterally. CV: S1, S2 within normal limits. Regular rate and rhythm noted. Without murmur, gallop or rub. Extremities:  Pulses intact. Without noted edema. Abdomen: Positive bowel sounds. Palpation reveals softness, with no distension, organomegaly or tenderness. No abdominal masses palpable. Skin: Skin is warm and dry. Musculo: Unchanged upon examination  Neuro: Alert and oriented X3. Cranial nerves grossly intact. Psych: Mood is normal.  Affect is normal.   Vital signs reviewed. Controlled Substances Monitoring:     No flowsheet data found. Plan Per Assessment:  Preston Enriquez was seen today for medication refill. Diagnoses and all orders for this visit:    Mental status change resolved  -     NV DISCHARGE MEDS RECONCILED W/ CURRENT OUTPATIENT MED LIST    Urinary tract infection without hematuria, site unspecified  -     NV DISCHARGE MEDS RECONCILED W/ CURRENT OUTPATIENT MED LIST    Dehydration  -     NV DISCHARGE MEDS RECONCILED W/ CURRENT OUTPATIENT MED LIST    Other orders  -     atorvastatin (LIPITOR) 40 MG tablet; Take 1 tablet by mouth daily  -     vitamin B-12 (CYANOCOBALAMIN) 1000 MCG tablet; Take 1 tablet by mouth daily  -     Cholecalciferol (VITAMIN D3) 1000 units TABS; Take 1 tablet by mouth daily  -     VENTOLIN  (90 Base) MCG/ACT inhaler; Inhale 1 puff into the lungs every 4 hours as needed (EVERY 4 HOURS PRN)        Return in 3 months (on 8/22/2019) for MEDICATION CHECK, FOLLOW UP [address] [place] [address] Alirio Yoder MD    Note was generated with the assistance of voice recognition software. Document was reviewed however may contain grammatical errors.          Post-Discharge Transitional Care Management Services or Hospital Follow Up      Lilian Sales   YOB: 1955    Date of Office Visit:  5/22/2019  Date of Hospital Admission: May 13, 2019  Date of Hospital Discharge: May 15, 2019    Care management risk score Rising risk (score 2-5) and Complex Care (Scores >=6): 0     Non face to face  following discharge, date last encounter closed (first attempt may have been earlier):      Call initiated 2 business days of discharge:     Patient Active Problem List   Diagnosis    Neck pain    Back pain    Cerebral atherosclerosis    Cervical disc displacement    Cervical radiculopathy    Disc displacement, lumbar    Lumbar radiculopathy    Peripheral neuropathy    Chronic pain of left knee    Sacroiliitis     Sacral spondylosis    Neural foraminal stenosis of lumbar spine    Hyperlipidemia    [facility] [address]    Benign essential hypertension       Allergies   Allergen Reactions    Latex      With condom     Benadryl [Diphenhydramine]      Does the opposite      Sulfa Antibiotics      As a child    Vistaril [Hydroxyzine Hcl]      \"does the complete opposite\"       Medications listed as ordered at the time of discharge from hospital      Medications marked \"taking\" at this time  Outpatient Medications Marked as Taking for the 5/22/19 encounter (Office Visit) with Margarito Urban MD   Medication Sig Dispense Refill    GARCINIA CAMBOGIA-CHROMIUM PO       buPROPion (WELLBUTRIN XL) 150 MG extended release tablet TAKE ONE TABLET BY MOUTH EVERY MORNING  1    triamcinolone (KENALOG) 0.1 % cream       albuterol sulfate HFA (VENTOLIN HFA) 108 (90 Base) MCG/ACT inhaler Inhale into the lungs      QUEtiapine (SEROQUEL) 400 MG tablet TAKE TWO TABLETS BY MOUTH AT BEDTIME  5    QUEtiapine (SEROQUEL) 400 MG tablet       atorvastatin (LIPITOR) 40 MG tablet Take 1 tablet by mouth daily 30 tablet 3    vitamin B-12 (CYANOCOBALAMIN) 1000 MCG tablet Take 1 tablet by mouth daily 30 tablet 3    Cholecalciferol (VITAMIN D3) 1000 units TABS Take 1 tablet by mouth daily 30 tablet 3    VENTOLIN  (90 Base) MCG/ACT inhaler Inhale 1 puff into the lungs every 4 hours as needed (EVERY 4 HOURS PRN) 1 Inhaler 2    PRISTIQ 100 MG TB24 extended release tablet Take 150 mg by mouth daily       acetaminophen (TYLENOL) 500 MG tablet Take 500 mg by mouth every 6 hours as needed for Pain Indications: Four 500mg QD      lamoTRIgine (LAMICTAL) 200 MG tablet Take 200 mg by mouth daily       LORazepam (ATIVAN) 1 MG tablet Take 1 mg by mouth every 8 hours as needed.  hydrocortisone 2.5 % cream Apply  topically as needed. Apply topically 2 times daily. Medications patient taking as of now reconciled against medications ordered at time of hospital discharge:     Chief Complaint   Patient presents with    Medication Refill     check up       History of Present illness - Follow up of Hospital diagnosis(es):     Inpatient course: Discharge summary reviewed- see chart. Interval history/Current status:    Vitals:    05/22/19 0738   BP: 132/84   Site: Left Upper Arm   Position: Sitting   Cuff Size: Medium Adult   Pulse: 114   Temp: 97.4 °F (36.3 °C)   SpO2: 97%   Weight: 223 lb (101.2 kg)   Height: 5' 5.5\" (1.664 m)     Body mass index is 36.54 kg/m².    Wt Readings from Last 3 Encounters:   05/22/19 223 lb (101.2 kg)   04/09/19 222 lb (100.7 kg)   09/11/18 233 lb (105.7 kg)     BP Readings from Last 3 Encounters:   05/22/19 132/84   04/09/19 (!) 185/116   04/09/19 (!) 151/85           Physical Exam:      Assessment/Plan:        Medical Decision Making:

## 2019-10-01 RX ORDER — ATORVASTATIN CALCIUM 40 MG/1
TABLET, FILM COATED ORAL
Qty: 30 TABLET | Refills: 3 | Status: SHIPPED | OUTPATIENT
Start: 2019-10-01 | End: 2019-10-30 | Stop reason: SDUPTHER

## 2019-10-10 RX ORDER — LANOLIN ALCOHOL/MO/W.PET/CERES
CREAM (GRAM) TOPICAL
Qty: 30 TABLET | Refills: 3 | Status: SHIPPED | OUTPATIENT
Start: 2019-10-10

## 2019-10-30 ENCOUNTER — OFFICE VISIT (OUTPATIENT)
Dept: PRIMARY CARE CLINIC | Age: 64
End: 2019-10-30
Payer: MEDICAID

## 2019-10-30 ENCOUNTER — HOSPITAL ENCOUNTER (OUTPATIENT)
Age: 64
Discharge: HOME | End: 2019-11-01
Payer: MEDICAID

## 2019-10-30 VITALS
SYSTOLIC BLOOD PRESSURE: 130 MMHG | HEART RATE: 97 BPM | HEIGHT: 63 IN | WEIGHT: 227 LBS | RESPIRATION RATE: 16 BRPM | OXYGEN SATURATION: 98 % | BODY MASS INDEX: 40.22 KG/M2 | DIASTOLIC BLOOD PRESSURE: 80 MMHG

## 2019-10-30 DIAGNOSIS — E55.9 VITAMIN D DEFICIENCY: ICD-10-CM

## 2019-10-30 DIAGNOSIS — L98.9 SKIN LESION: ICD-10-CM

## 2019-10-30 DIAGNOSIS — E78.5 HYPERLIPIDEMIA, UNSPECIFIED HYPERLIPIDEMIA TYPE: Primary | ICD-10-CM

## 2019-10-30 DIAGNOSIS — M12.9 ARTHROPATHY: ICD-10-CM

## 2019-10-30 DIAGNOSIS — E78.5 HYPERLIPIDEMIA, UNSPECIFIED HYPERLIPIDEMIA TYPE: ICD-10-CM

## 2019-10-30 DIAGNOSIS — Z12.12 ENCOUNTER FOR SCREENING FOR MALIGNANT NEOPLASM OF RECTUM: ICD-10-CM

## 2019-10-30 PROBLEM — F31.9 BIPOLAR DISORDER (HCC): Status: ACTIVE | Noted: 2019-10-30

## 2019-10-30 PROBLEM — F41.9 ANXIETY DISORDER: Status: ACTIVE | Noted: 2019-10-30

## 2019-10-30 LAB
CHOLESTEROL, TOTAL: 212 MG/DL (ref 0–199)
HDLC SERPL-MCNC: 74 MG/DL
LDL CHOLESTEROL CALCULATED: 98 MG/DL (ref 0–99)
TRIGL SERPL-MCNC: 201 MG/DL (ref 0–149)
VITAMIN D 25-HYDROXY: 54 NG/ML (ref 30–100)
VLDLC SERPL CALC-MCNC: 40 MG/DL

## 2019-10-30 PROCEDURE — G8427 DOCREV CUR MEDS BY ELIG CLIN: HCPCS | Performed by: FAMILY MEDICINE

## 2019-10-30 PROCEDURE — G8417 CALC BMI ABV UP PARAM F/U: HCPCS | Performed by: FAMILY MEDICINE

## 2019-10-30 PROCEDURE — G8482 FLU IMMUNIZE ORDER/ADMIN: HCPCS | Performed by: FAMILY MEDICINE

## 2019-10-30 PROCEDURE — 4004F PT TOBACCO SCREEN RCVD TLK: CPT | Performed by: FAMILY MEDICINE

## 2019-10-30 PROCEDURE — 82306 VITAMIN D 25 HYDROXY: CPT

## 2019-10-30 PROCEDURE — 90686 IIV4 VACC NO PRSV 0.5 ML IM: CPT | Performed by: FAMILY MEDICINE

## 2019-10-30 PROCEDURE — 36415 COLL VENOUS BLD VENIPUNCTURE: CPT

## 2019-10-30 PROCEDURE — G8599 NO ASA/ANTIPLAT THER USE RNG: HCPCS | Performed by: FAMILY MEDICINE

## 2019-10-30 PROCEDURE — 99214 OFFICE O/P EST MOD 30 MIN: CPT | Performed by: FAMILY MEDICINE

## 2019-10-30 PROCEDURE — 3017F COLORECTAL CA SCREEN DOC REV: CPT | Performed by: FAMILY MEDICINE

## 2019-10-30 PROCEDURE — 80061 LIPID PANEL: CPT

## 2019-10-30 PROCEDURE — 90471 IMMUNIZATION ADMIN: CPT | Performed by: FAMILY MEDICINE

## 2019-10-30 RX ORDER — MELATONIN
1 DAILY
Qty: 30 TABLET | Refills: 3 | Status: SHIPPED
Start: 2019-10-30 | End: 2020-03-16

## 2019-10-30 RX ORDER — ATORVASTATIN CALCIUM 40 MG/1
40 TABLET, FILM COATED ORAL DAILY
Qty: 30 TABLET | Refills: 3 | Status: SHIPPED
Start: 2019-10-30 | End: 2020-02-24

## 2019-11-07 ENCOUNTER — TELEPHONE (OUTPATIENT)
Dept: PRIMARY CARE CLINIC | Age: 64
End: 2019-11-07

## 2019-11-18 ENCOUNTER — OFFICE VISIT (OUTPATIENT)
Dept: PRIMARY CARE CLINIC | Age: 64
End: 2019-11-18
Payer: MEDICAID

## 2019-11-18 VITALS
RESPIRATION RATE: 16 BRPM | OXYGEN SATURATION: 98 % | DIASTOLIC BLOOD PRESSURE: 80 MMHG | HEART RATE: 102 BPM | BODY MASS INDEX: 37.12 KG/M2 | SYSTOLIC BLOOD PRESSURE: 122 MMHG | WEIGHT: 231 LBS | HEIGHT: 66 IN

## 2019-11-18 DIAGNOSIS — L98.9 SKIN LESION: ICD-10-CM

## 2019-11-18 DIAGNOSIS — I10 BENIGN ESSENTIAL HYPERTENSION: Primary | ICD-10-CM

## 2019-11-18 DIAGNOSIS — E78.5 HYPERLIPIDEMIA, UNSPECIFIED HYPERLIPIDEMIA TYPE: ICD-10-CM

## 2019-11-18 PROCEDURE — 3017F COLORECTAL CA SCREEN DOC REV: CPT | Performed by: FAMILY MEDICINE

## 2019-11-18 PROCEDURE — G8427 DOCREV CUR MEDS BY ELIG CLIN: HCPCS | Performed by: FAMILY MEDICINE

## 2019-11-18 PROCEDURE — G8482 FLU IMMUNIZE ORDER/ADMIN: HCPCS | Performed by: FAMILY MEDICINE

## 2019-11-18 PROCEDURE — G8417 CALC BMI ABV UP PARAM F/U: HCPCS | Performed by: FAMILY MEDICINE

## 2019-11-18 PROCEDURE — G8599 NO ASA/ANTIPLAT THER USE RNG: HCPCS | Performed by: FAMILY MEDICINE

## 2019-11-18 PROCEDURE — 99214 OFFICE O/P EST MOD 30 MIN: CPT | Performed by: FAMILY MEDICINE

## 2019-11-18 PROCEDURE — 4004F PT TOBACCO SCREEN RCVD TLK: CPT | Performed by: FAMILY MEDICINE

## 2019-11-22 ENCOUNTER — TELEPHONE (OUTPATIENT)
Dept: ADMINISTRATIVE | Age: 64
End: 2019-11-22

## 2019-12-02 ENCOUNTER — TELEPHONE (OUTPATIENT)
Dept: PRIMARY CARE CLINIC | Age: 64
End: 2019-12-02

## 2019-12-02 DIAGNOSIS — N61.1 ABSCESS OF RIGHT BREAST: Primary | ICD-10-CM

## 2019-12-12 RX ORDER — QUETIAPINE FUMARATE 100 MG/1
100 TABLET, FILM COATED ORAL NIGHTLY
COMMUNITY
End: 2020-09-23

## 2019-12-12 RX ORDER — OMEGA-3 FATTY ACIDS/FISH OIL 300-1000MG
1 CAPSULE ORAL PRN
COMMUNITY
End: 2020-09-23

## 2019-12-12 RX ORDER — ACETAMINOPHEN 325 MG/1
500 TABLET ORAL EVERY 6 HOURS PRN
COMMUNITY
End: 2021-02-17 | Stop reason: ALTCHOICE

## 2020-01-21 ENCOUNTER — TELEPHONE (OUTPATIENT)
Dept: PRIMARY CARE CLINIC | Age: 65
End: 2020-01-21

## 2020-01-21 NOTE — TELEPHONE ENCOUNTER
US tech called started that this is a 6 month f/u and you put an order in for for that but it was recommended a 6 month mammo by the radiologist, a new order needs sent over to a complete DX of right breast mammo

## 2020-01-23 ENCOUNTER — TELEPHONE (OUTPATIENT)
Dept: PRIMARY CARE CLINIC | Age: 65
End: 2020-01-23

## 2020-01-23 NOTE — TELEPHONE ENCOUNTER
US to right breast for mass is pended for signature.       Electronically signed by Nik Mcguire LPN on 5/85/18 at 15:70 AM

## 2020-01-29 ENCOUNTER — TELEPHONE (OUTPATIENT)
Dept: PRIMARY CARE CLINIC | Age: 65
End: 2020-01-29

## 2020-01-29 NOTE — TELEPHONE ENCOUNTER
Left message for patient call regarding US. Needs to fax it wherever it is she is going to have it done.       Electronically signed by Chavo Lane LPN on 4/87/61 at 9:45 AM

## 2020-02-24 RX ORDER — ATORVASTATIN CALCIUM 40 MG/1
TABLET, FILM COATED ORAL
Qty: 30 TABLET | Refills: 3 | Status: SHIPPED
Start: 2020-02-24 | End: 2020-04-29 | Stop reason: SDUPTHER

## 2020-03-16 RX ORDER — MELATONIN
Qty: 30 TABLET | Refills: 3 | Status: SHIPPED
Start: 2020-03-16 | End: 2021-02-02 | Stop reason: SDUPTHER

## 2020-04-29 ENCOUNTER — VIRTUAL VISIT (OUTPATIENT)
Dept: PRIMARY CARE CLINIC | Age: 65
End: 2020-04-29
Payer: MEDICARE

## 2020-04-29 PROCEDURE — 99441 PR PHYS/QHP TELEPHONE EVALUATION 5-10 MIN: CPT | Performed by: FAMILY MEDICINE

## 2020-04-29 RX ORDER — ATORVASTATIN CALCIUM 40 MG/1
TABLET, FILM COATED ORAL
Qty: 30 TABLET | Refills: 3 | Status: SHIPPED
Start: 2020-04-29 | End: 2021-02-02 | Stop reason: SDUPTHER

## 2020-06-01 ENCOUNTER — TELEPHONE (OUTPATIENT)
Dept: PRIMARY CARE CLINIC | Age: 65
End: 2020-06-01

## 2020-06-18 ENCOUNTER — OUTSIDE SERVICES (OUTPATIENT)
Dept: PRIMARY CARE CLINIC | Age: 65
End: 2020-06-18
Payer: COMMERCIAL

## 2020-06-18 PROCEDURE — 99305 1ST NF CARE MODERATE MDM 35: CPT | Performed by: FAMILY MEDICINE

## 2020-06-18 NOTE — PROGRESS NOTES
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 Nicolasa Narayanan    Chronic back pain     upper, mid and lower back    Dementia ([facility]     Depression     bipolar    H/O being hospitalized     for cervical, thoracic, and lumbar pain, at [address] H/O colonoscopy     Head injury two years ago    Headache(784.0)     8/10 severity    Hyperlipidemia     Memory difficulties     mispelling words, dialing wrong numbers    Migraine     10/10 severity, 6 per year    Numbness and tingling     both arms    OCD (obsessive compulsive disorder)      Social History     Socioeconomic History    Marital status: Single     Spouse name: Not on file    Number of children: Not on file    Years of education: Not on file    Highest education level: Not on file   Occupational History    Not on file   Social Needs    Financial resource strain: Not on file    Food insecurity     Worry: Not on file     Inability: Not on file    Transportation needs     Medical: Not on file     Non-medical: Not on file   Tobacco Use    Smoking status: Current Every Day Smoker     Packs/day: 1.00     Years: 10.00     Pack years: 10.00     Start date: 5/22/2007    Smokeless tobacco: Never Used   Substance and Sexual Activity    Alcohol use: No    Drug use: No    Sexual activity: Not on file   Lifestyle    Physical activity     Days per week: Not on file     Minutes per session: Not on file    Stress: Not on file   Relationships    Social connections     Talks on phone: Not on file     Gets together: Not on file     Attends Muslim service: Not on file     Active member of club or organization: Not on file     Attends meetings of clubs or organizations: Not on file

## 2020-07-02 ENCOUNTER — OUTSIDE SERVICES (OUTPATIENT)
Dept: PRIMARY CARE CLINIC | Age: 65
End: 2020-07-02
Payer: COMMERCIAL

## 2020-07-02 VITALS
OXYGEN SATURATION: 94 % | HEART RATE: 79 BPM | SYSTOLIC BLOOD PRESSURE: 127 MMHG | TEMPERATURE: 98.1 F | DIASTOLIC BLOOD PRESSURE: 60 MMHG | RESPIRATION RATE: 18 BRPM

## 2020-07-02 PROCEDURE — 99309 SBSQ NF CARE MODERATE MDM 30: CPT | Performed by: FAMILY MEDICINE

## 2020-07-02 NOTE — PROGRESS NOTES
650 mg by mouth every 6 hours as needed for Pain Take 2 tablets (650mg) by mouth every 6 hours as needed for pain. * pharmacy recommends ax of 3 grams of tylenol per 24 hours*, Disp: , Rfl:     Omega 3 340 MG CPDR, Take 340 mg by mouth, Disp: , Rfl:     QUEtiapine (SEROQUEL) 100 MG tablet, Take 100 mg by mouth nightly 1-2 at bedtime, Disp: , Rfl:     ibuprofen (ADVIL;MOTRIN) 200 MG CAPS, Take 1 capsule by mouth as needed for Fever 2 tabs as needed otc, Disp: , Rfl:     vitamin B-12 (CYANOCOBALAMIN) 1000 MCG tablet, TAKE ONE TABLET BY MOUTH EVERY DAY, Disp: 30 tablet, Rfl: 3    GARCINIA CAMBOGIA-CHROMIUM PO, , Disp: , Rfl:     buPROPion (WELLBUTRIN XL) 150 MG extended release tablet, TAKE ONE TABLET BY MOUTH EVERY MORNING, Disp: , Rfl: 1    triamcinolone (KENALOG) 0.1 % cream, , Disp: , Rfl:     albuterol sulfate HFA (VENTOLIN HFA) 108 (90 Base) MCG/ACT inhaler, Inhale into the lungs, Disp: , Rfl:     QUEtiapine (SEROQUEL) 400 MG tablet, TAKE TWO TABLETS BY MOUTH AT BEDTIME, Disp: , Rfl: 5    QUEtiapine (SEROQUEL) 400 MG tablet, , Disp: , Rfl:     VENTOLIN  (90 Base) MCG/ACT inhaler, Inhale 1 puff into the lungs every 4 hours as needed (EVERY 4 HOURS PRN), Disp: 1 Inhaler, Rfl: 2    PRISTIQ 100 MG TB24 extended release tablet, Take 150 mg by mouth daily , Disp: , Rfl:     acetaminophen (TYLENOL) 500 MG tablet, Take 500 mg by mouth every 6 hours as needed for Pain Indications: Four 500mg QD, Disp: , Rfl:     lamoTRIgine (LAMICTAL) 200 MG tablet, Take 200 mg by mouth daily , Disp: , Rfl:     LORazepam (ATIVAN) 1 MG tablet, Take 1 mg by mouth every 8 hours as needed. , Disp: , Rfl:     hydrocortisone 2.5 % cream, Apply  topically as needed. Apply topically 2 times daily. 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 Pam Napoles    Chronic back pain     upper, mid and lower back    Dementia ([facility]     Depression     bipolar    H/O being hospitalized     for cervical, thoracic, and lumbar pain, at [address] H/O colonoscopy     Head injury two years ago    Headache(784.0)     8/10 severity    Hyperlipidemia     Memory difficulties     mispelling words, dialing wrong numbers    Migraine     10/10 severity, 6 per year    Numbness and tingling     both arms    OCD (obsessive compulsive disorder)      Social History     Socioeconomic History    Marital status: Single     Spouse name: Not on file    Number of children: Not on file    Years of education: Not on file    Highest education level: Not on file   Occupational History    Not on file   Social Needs    Financial resource strain: Not on file    Food insecurity     Worry: Not on file     Inability: Not on file    Transportation needs     Medical: Not on file     Non-medical: Not on file   Tobacco Use    Smoking status: Current Every Day Smoker     Packs/day: 1.00     Years: 10.00     Pack years: 10.00     Start date: 5/22/2007    Smokeless tobacco: Never Used   Substance and Sexual Activity    Alcohol use: No    Drug use: No    Sexual activity: Not on file   Lifestyle    Physical activity     Days per week: Not on file     Minutes per session: Not on file    Stress: Not on file   Relationships    Social connections     Talks on phone: Not on file     Gets together: Not on file     Attends [employer] service: Not on file     Active member of club or organization: Not on file     Attends meetings of clubs or organizations: Not on file     Relationship status: Not on file    Intimate partner violence     Fear of current or ex partner: Not on file     Emotionally abused: Not on file     Physically abused: Not on file     Forced sexual activity: Not on file   Other Topics Concern    Not on file   Social History Narrative    Not on file     Past Surgical History:   Procedure Laterality Date    ANESTHESIA NERVE BLOCK Left 4/9/2019    LEFT TRANSFORAMINAL LUMBAR NERVE BLOCK AT L4-5 UNDER X-RAY WITH IV SEDATION performed by Gloria Gowers, DO at [address] [place] [address] Dr      joe Allen 39 Bilateral 8/28/2018    LUMBAR TRANSFORAMINAL L4-5 performed by Gloria Gowers, DO at [facility] Left 04/24/2018    left sacroiliac joint injection #1 with IV sedation    NERVE BLOCK Left 05/01/2018    left sacroiliac joint injection #2    NERVE BLOCK Left 07/24/2018    NERVE BLOCK Bilateral 08/28/2018    transforaminal injection    NERVE BLOCK  09/11/2018    NERVE BLOCK Left 04/09/2019    with sedation    DC NERVOUS SYSTEM SURGERY UNLISTED Left 7/24/2018    LEFT SACROILIAC JOINT RADIOFREQUENCY LATERAL SACRAL NERVES S1 S2 S3 performed by Gloria Gowers, DO at [facility] DX/THER SBST INTRLMNR LMBR/SAC W/IMG GDN N/A 9/11/2018    BILATERAL LUMBAR TRANSFORAMINAL NERVE BLOCK  #2 L4-5 WITH IV SEDATION performed by Gloria Gowers, DO at [address] [facility] NOSE/CLEFT LIP/TIP Left 4/24/2018    LEFT SACROILIAC JOINT INJECTION #1 WITH IV SEDATION performed by Gloria Gowers, DO at [address] [facility] NOSE/CLEFT LIP/TIP Left 5/1/2018    LEFT SACROILIAC JOINT INJECTION #2 WITH IV SEDATION performed by Gloria Gowers, DO at [address] [address]    2 neck surgeries & 1 -LB surgery      Family History   Problem Relation Age of Onset    Dementia Mother     Alzheimer's Disease Mother     Parkinsonism Father     Brain Cancer Brother         tumor    Lung Cancer Brother         Vitals:    07/02/20 0713   BP: 127/60 Pulse: 79   Resp: 18   Temp: 98.1 °F (36.7 °C)   SpO2: 94%               No flowsheet data found. Plan Per Assessment:  [address] was seen today for other. Diagnoses and all orders for this visit:    Bipolar I disorder ([facility]    Anxiety disorder, unspecified type      Continue current medical therapy. Nursing staff to call with any noted change in clinical status. Return in about 4 weeks (around 7/30/2020) for MEDICATION CHECK, FOLLOW UP [address] [place] [address] Magdalene Kay MD    Note was generated with the assistance of voice recognition software. Document was reviewed however may contain grammatical errors.

## 2020-07-09 ENCOUNTER — TELEPHONE (OUTPATIENT)
Dept: ADMINISTRATIVE | Age: 65
End: 2020-07-09

## 2020-07-13 ENCOUNTER — TELEPHONE (OUTPATIENT)
Dept: PRIMARY CARE CLINIC | Age: 65
End: 2020-07-13

## 2020-07-14 ENCOUNTER — TELEPHONE (OUTPATIENT)
Dept: PRIMARY CARE CLINIC | Age: 65
End: 2020-07-14

## 2020-08-31 ENCOUNTER — TELEPHONE (OUTPATIENT)
Dept: PRIMARY CARE CLINIC | Age: 65
End: 2020-08-31

## 2020-08-31 NOTE — TELEPHONE ENCOUNTER
I just need ok to sched Pt in office. She answered yes to diarrhea and cough on covid questionnaire. She wants in office appt w/Dr Paige Pereira to follow up hosp visit and meds. Please advise.

## 2020-09-23 ENCOUNTER — TELEPHONE (OUTPATIENT)
Dept: ADMINISTRATIVE | Age: 65
End: 2020-09-23

## 2020-09-23 ENCOUNTER — VIRTUAL VISIT (OUTPATIENT)
Dept: PRIMARY CARE CLINIC | Age: 65
End: 2020-09-23
Payer: COMMERCIAL

## 2020-09-23 PROCEDURE — 4004F PT TOBACCO SCREEN RCVD TLK: CPT | Performed by: FAMILY MEDICINE

## 2020-09-23 PROCEDURE — 1123F ACP DISCUSS/DSCN MKR DOCD: CPT | Performed by: FAMILY MEDICINE

## 2020-09-23 PROCEDURE — 3017F COLORECTAL CA SCREEN DOC REV: CPT | Performed by: FAMILY MEDICINE

## 2020-09-23 PROCEDURE — G8417 CALC BMI ABV UP PARAM F/U: HCPCS | Performed by: FAMILY MEDICINE

## 2020-09-23 PROCEDURE — G8427 DOCREV CUR MEDS BY ELIG CLIN: HCPCS | Performed by: FAMILY MEDICINE

## 2020-09-23 PROCEDURE — G8400 PT W/DXA NO RESULTS DOC: HCPCS | Performed by: FAMILY MEDICINE

## 2020-09-23 PROCEDURE — 3023F SPIROM DOC REV: CPT | Performed by: FAMILY MEDICINE

## 2020-09-23 PROCEDURE — 99214 OFFICE O/P EST MOD 30 MIN: CPT | Performed by: FAMILY MEDICINE

## 2020-09-23 PROCEDURE — G8926 SPIRO NO PERF OR DOC: HCPCS | Performed by: FAMILY MEDICINE

## 2020-09-23 PROCEDURE — 4040F PNEUMOC VAC/ADMIN/RCVD: CPT | Performed by: FAMILY MEDICINE

## 2020-09-23 PROCEDURE — 1090F PRES/ABSN URINE INCON ASSESS: CPT | Performed by: FAMILY MEDICINE

## 2020-09-23 RX ORDER — DESVENLAFAXINE 50 MG/1
TABLET, EXTENDED RELEASE ORAL
COMMUNITY
Start: 2020-08-25 | End: 2021-02-17 | Stop reason: ALTCHOICE

## 2020-09-23 RX ORDER — TRIAMCINOLONE ACETONIDE 1 MG/G
CREAM TOPICAL
Qty: 1 TUBE | Refills: 2 | Status: SHIPPED | OUTPATIENT
Start: 2020-09-23 | End: 2022-02-17 | Stop reason: SDUPTHER

## 2020-09-23 RX ORDER — ASCORBIC ACID 500 MG
500 TABLET ORAL DAILY
COMMUNITY

## 2020-09-23 RX ORDER — LORATADINE 10 MG/1
10 TABLET ORAL DAILY
Qty: 30 TABLET | Refills: 5 | Status: SHIPPED | OUTPATIENT
Start: 2020-09-23 | End: 2022-02-17 | Stop reason: ALTCHOICE

## 2020-09-23 RX ORDER — CALCIUM CARBONATE/MAG CARB 250-300 MG
TABLET ORAL
COMMUNITY

## 2020-09-23 NOTE — TELEPHONE ENCOUNTER
Pt states Dr. Alyssia Goodwin discussed need for vaccines for tetanus, shingles, pneumonia, flu, Pertussis and also need for lab work. She would like to obtain these asap. She would like an order for anything that can't be obtained at your office at her next visit on 10/8/20. Joanna Mcmahon She also needs an order for a mammogram. Please call pt to discuss.

## 2020-09-23 NOTE — PROGRESS NOTES
2020    TELEHEALTH EVALUATION -- Audio/Visual (During NWVIF-51 public health emergency)    HPI: This 80-year-old female presents today for a follow-up evaluation of her chronic medical problems. Current medication list reviewed. The patient is tolerating all medications well without adverse events or known side effects. The patient is not up-to-date on all age-appropriate wellness issues. The patient states she recently fell at home sustaining a right periorbital contusion. The patient did not seek emergency room care. The patient is also requesting a prescription for diffuse pruritus. Elena Sales (:  1955) has requested an audio/video evaluation for the following concern(s): Evaluation management of chronic medical problems. Review of Systems negative unless otherwise noted in HPI. Prior to Visit Medications    Medication Sig Taking? Authorizing Provider   desvenlafaxine succinate (PRISTIQ) 50 MG TB24 extended release tablet TAKE ONE TABLET BY MOUTH EVERY DAY Yes Historical Provider, MD   Calcium Carb-Magnesium Carb (MAGNEBIND 300) 250-300 MG TABS Take by mouth Yes Historical Provider, MD   vitamin C (ASCORBIC ACID) 500 MG tablet Take 500 mg by mouth daily Yes Historical Provider, MD   triamcinolone (KENALOG) 0.1 % cream Apply twice daily.  Yes Franky Abraham MD   loratadine (CLARITIN) 10 MG tablet Take 1 tablet by mouth daily Yes Franky Abraham MD   atorvastatin (LIPITOR) 40 MG tablet TAKE ONE TABLET BY MOUTH EVERY DAY Yes Franky Abarham MD   Cholecalciferol (VITAMIN D3) 25 MCG (1000 UT) TABS TAKE ONE TABLET BY MOUTH EVERY DAY Yes Franky Abraham MD   Omega 3 340 MG CPDR Take 340 mg by mouth Yes Historical Provider, MD   vitamin B-12 (CYANOCOBALAMIN) 1000 MCG tablet TAKE ONE TABLET BY MOUTH EVERY DAY  Patient taking differently: Take 5,000 mcg by mouth daily  Yes MD DEBBIE MehtaOGIA-CHROMIUM PO  Yes Historical Provider, MD   albuterol sulfate HFA (VENTOLIN HFA) well-developed and well-nourished [] No apparent distress      [] Abnormal-   Mental status  [x] Alert and awake  [x] Oriented to person/place/time [x]Able to follow commands      Eyes:  EOM    [x]  Normal  [] Abnormal-  Sclera  [x]  Normal  [] Abnormal -         Discharge [x]  None visible  [] Abnormal -    HENT:   [x] Normocephalic, atraumatic. [] Abnormal   [x] Mouth/Throat: Mucous membranes are moist.     External Ears [x] Normal  [] Abnormal-     Neck: [x] No visualized mass     Pulmonary/Chest: [x] Respiratory effort normal.  [x] No visualized signs of difficulty breathing or respiratory distress        [] Abnormal-      Musculoskeletal:   [] Normal gait with no signs of ataxia         [] Normal range of motion of neck        [] Abnormal-       Neurological:        [x] No Facial Asymmetry (Cranial nerve 7 motor function) (limited exam to video visit)          [x] No gaze palsy        [] Abnormal-         Skin:        [] No significant exanthematous lesions or discoloration noted on facial skin         [x] Abnormal-right periorbital and nasal bridge redness noted. Psychiatric:       [x] Normal Affect [x] No Hallucinations        [] Abnormal-     Other pertinent observable physical exam findings-     ASSESSMENT/PLAN: #1 hypertension #2 hyperlipidemia #3 COPD #4 right periorbital contusion. #5 diffuse pruritus plan #1 continue current medical therapy #2 schedule annual wellness visit in 2 weeks. #3 Loratadine 10 mg daily. Return in about 2 weeks (around 10/7/2020) for Annual wellness visit. Kevin Garza is a 72 y.o. female being evaluated by a Virtual Visit (video visit) encounter to address concerns as mentioned above. A caregiver was present when appropriate. Due to this being a TeleHealth encounter (During CKCIG-68 public health emergency), evaluation of the following organ systems was limited: Vitals/Constitutional/EENT/Resp/CV/GI//MS/Neuro/Skin/Heme-Lymph-Imm.   Pursuant to the emergency declaration under the [facility], [address] [place] [address] authority and the [employer] Act, this Virtual Visit was conducted with patient's (and/or legal guardian's) consent, to reduce the patient's risk of exposure to COVID-19 and provide necessary medical care. The patient (and/or legal guardian) has also been advised to contact this office for worsening conditions or problems, and seek emergency medical treatment and/or call 911 if deemed necessary. This encounter was conducted via [email or web address]. Patient identification was verified at the start of the visit: Yes    Total time spent on this encounter: 25 minutes. Services were provided through a video synchronous discussion virtually to substitute for in-person clinic visit. Patient and provider were located at their individual homes. --Alejandro Monahan MD on 9/23/2020 at 9:55 AM    An electronic signature was used to authenticate this note.

## 2020-09-24 ENCOUNTER — TELEPHONE (OUTPATIENT)
Dept: PRIMARY CARE CLINIC | Age: 65
End: 2020-09-24

## 2020-10-08 ENCOUNTER — OFFICE VISIT (OUTPATIENT)
Dept: PRIMARY CARE CLINIC | Age: 65
End: 2020-10-08
Payer: COMMERCIAL

## 2020-10-08 VITALS
SYSTOLIC BLOOD PRESSURE: 136 MMHG | OXYGEN SATURATION: 97 % | RESPIRATION RATE: 16 BRPM | HEIGHT: 66 IN | DIASTOLIC BLOOD PRESSURE: 70 MMHG | WEIGHT: 231 LBS | BODY MASS INDEX: 37.12 KG/M2 | HEART RATE: 103 BPM

## 2020-10-08 PROCEDURE — G8484 FLU IMMUNIZE NO ADMIN: HCPCS | Performed by: FAMILY MEDICINE

## 2020-10-08 PROCEDURE — G0438 PPPS, INITIAL VISIT: HCPCS | Performed by: FAMILY MEDICINE

## 2020-10-08 PROCEDURE — 4040F PNEUMOC VAC/ADMIN/RCVD: CPT | Performed by: FAMILY MEDICINE

## 2020-10-08 PROCEDURE — 3017F COLORECTAL CA SCREEN DOC REV: CPT | Performed by: FAMILY MEDICINE

## 2020-10-08 PROCEDURE — 1123F ACP DISCUSS/DSCN MKR DOCD: CPT | Performed by: FAMILY MEDICINE

## 2020-10-08 RX ORDER — DESVENLAFAXINE 100 MG/1
TABLET, EXTENDED RELEASE ORAL
COMMUNITY
Start: 2020-09-29

## 2020-10-08 ASSESSMENT — PATIENT HEALTH QUESTIONNAIRE - PHQ9
2. FEELING DOWN, DEPRESSED OR HOPELESS: 0
SUM OF ALL RESPONSES TO PHQ QUESTIONS 1-9: 0
DEPRESSION UNABLE TO ASSESS: FUNCTIONAL CAPACITY MOTIVATION LIMITS ACCURACY
SUM OF ALL RESPONSES TO PHQ QUESTIONS 1-9: 0
1. LITTLE INTEREST OR PLEASURE IN DOING THINGS: 0
SUM OF ALL RESPONSES TO PHQ9 QUESTIONS 1 & 2: 0

## 2020-10-08 ASSESSMENT — LIFESTYLE VARIABLES: HOW OFTEN DO YOU HAVE A DRINK CONTAINING ALCOHOL: 0

## 2020-10-08 NOTE — PATIENT INSTRUCTIONS
Personalized Preventive Plan for Rashel Merchant - 10/8/2020  Medicare offers a range of preventive health benefits. Some of the tests and screenings are paid in full while other may be subject to a deductible, co-insurance, and/or copay. Some of these benefits include a comprehensive review of your medical history including lifestyle, illnesses that may run in your family, and various assessments and screenings as appropriate. After reviewing your medical record and screening and assessments performed today your provider may have ordered immunizations, labs, imaging, and/or referrals for you. A list of these orders (if applicable) as well as your Preventive Care list are included within your After Visit Summary for your review. Other Preventive Recommendations:    · A preventive eye exam performed by an eye specialist is recommended every 1-2 years to screen for glaucoma; cataracts, macular degeneration, and other eye disorders. · A preventive dental visit is recommended every 6 months. · Try to get at least 150 minutes of exercise per week or 10,000 steps per day on a pedometer . · Order or download the FREE \"Exercise & Physical Activity: Your Everyday Guide\" from [employer]. Call 8-556.146.6697 or search [employer] online. · You need 5790-2537 mg of calcium and 1209-8953 IU of vitamin D per day. It is possible to meet your calcium requirement with diet alone, but a vitamin D supplement is usually necessary to meet this goal.  · When exposed to the sun, use a sunscreen that protects against both UVA and UVB radiation with an SPF of 30 or greater. Reapply every 2 to 3 hours or after sweating, drying off with a towel, or swimming. · Always wear a seat belt when traveling in a car. Always wear a helmet when riding a bicycle or motorcycle.

## 2020-10-08 NOTE — PROGRESS NOTES
Medicare Annual Wellness Visit  Name: Yolanda Madden Date: 10/8/2020   MRN: <[id number]> Sex: Female   Age: 72 y.o. Ethnicity: Non-/Non    : 1955 Race: White      Elena Sales is here for Medicare AWV    Screenings for behavioral, psychosocial and functional/safety risks, and cognitive dysfunction are all negative except as indicated below. These results, as well as other patient data from the [address] form, are documented in Flowsheets linked to this Encounter. Allergies   Allergen Reactions    Latex      With condom     Amlodipine Besylate     Diphenhydramine      Does the opposite    wired    Sulfa Antibiotics      As a child    Vistaril [Hydroxyzine Hcl]      \"does the complete opposite\"         Prior to Visit Medications    Medication Sig Taking? Authorizing Provider   desvenlafaxine succinate (PRISTIQ) 100 MG TB24 extended release tablet TAKE ONE TABLET BY MOUTH EVERY DAY  Historical Provider, MD   desvenlafaxine succinate (PRISTIQ) 50 MG TB24 extended release tablet TAKE ONE TABLET BY MOUTH EVERY DAY  Historical Provider, MD   Calcium Carb-Magnesium Carb (MAGNEBIND 300) 250-300 MG TABS Take by mouth  Historical Provider, MD   vitamin C (ASCORBIC ACID) 500 MG tablet Take 500 mg by mouth daily  Historical Provider, MD   triamcinolone (KENALOG) 0.1 % cream Apply twice daily. Emily Mendoza MD   loratadine (CLARITIN) 10 MG tablet Take 1 tablet by mouth daily  Emily Mendoza MD   atorvastatin (LIPITOR) 40 MG tablet TAKE ONE TABLET BY MOUTH EVERY DAY  KAYLEEN Gutierrez MD   vitamin D (CHOLECALCIFEROL) 25 MCG (1000 UT) TABS tablet Take 1 tablet by mouth daily  Emily Mendoza MD   Cholecalciferol (VITAMIN D3) 25 MCG (1000 UT) TABS TAKE ONE TABLET BY MOUTH EVERY DAY  KAYLEEN Gutierrez MD   acetaminophen (TYLENOL) 325 MG tablet Take 500 mg by mouth every 6 hours as needed for Pain Take 2 tablets (650mg) by mouth every 6 hours as needed for pain.  * pharmacy recommends ax of 3 grams of tylenol per 24 hours*   Historical Provider, MD   Omega 3 340 MG CPDR Take 340 mg by mouth  Historical Provider, MD   vitamin B-12 (CYANOCOBALAMIN) 1000 MCG tablet TAKE ONE TABLET BY MOUTH EVERY DAY  Patient taking differently: Take 5,000 mcg by mouth daily   MD SUNNI OcampoINIA CAMBOGIA-CHROMIUM PO   Historical Provider, MD   buPROPion (WELLBUTRIN XL) 150 MG extended release tablet TAKE ONE TABLET BY MOUTH EVERY MORNING  Historical Provider, MD   albuterol sulfate HFA (VENTOLIN HFA) 108 (90 Base) MCG/ACT inhaler Inhale into the lungs  Historical Provider, MD   QUEtiapine (SEROQUEL) 400 MG tablet TAKE TWO TABLETS BY MOUTH AT BEDTIME  Historical Provider, MD   QUEtiapine (SEROQUEL) 400 MG tablet   Historical Provider, MD   VENTOLIN  (90 Base) MCG/ACT inhaler Inhale 1 puff into the lungs every 4 hours as needed (EVERY 4 HOURS PRN)  R Allison Gutierrez MD   acetaminophen (TYLENOL) 500 MG tablet Take 500 mg by mouth every 6 hours as needed for Pain Indications: Four 500mg QD  Historical Provider, MD   lamoTRIgine (LAMICTAL) 200 MG tablet Take 200 mg by mouth daily   Historical Provider, MD   LORazepam (ATIVAN) 1 MG tablet Take 1 mg by mouth every 8 hours as needed. Historical Provider, MD   hydrocortisone 2.5 % cream Apply  topically as needed. Apply topically 2 times daily.   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 Sung Lorenz    Chronic back pain     upper, mid and lower back    Dementia ([facility]     Depression     bipolar    H/O being hospitalized     for cervical, thoracic, and lumbar pain, at [address] H/O colonoscopy     Head injury two years ago    Headache(784.0)     8/10 severity    Hyperlipidemia     Memory difficulties     mispelling words, dialing wrong numbers    Migraine     10/10 severity, 6 per year    Numbness and tingling     both arms    OCD (obsessive compulsive disorder)        Past Surgical History:   Procedure Laterality Date    ANESTHESIA NERVE BLOCK Left 4/9/2019    LEFT TRANSFORAMINAL LUMBAR NERVE BLOCK AT L4-5 UNDER X-RAY WITH IV SEDATION performed by Jason Hernandez DO at [facility]      partial   [facility]    LUMBAR SPINE SURGERY Bilateral 8/28/2018    LUMBAR TRANSFORAMINAL L4-5 performed by [facility] DO at [facility] Left 04/24/2018    left sacroiliac joint injection #1 with IV sedation    NERVE BLOCK Left 05/01/2018    left sacroiliac joint injection #2    NERVE BLOCK Left 07/24/2018    NERVE BLOCK Bilateral 08/28/2018    transforaminal injection    NERVE BLOCK  09/11/2018    NERVE BLOCK Left 04/09/2019    with sedation    [place] NERVOUS SYSTEM SURGERY UNLISTED Left 7/24/2018    LEFT SACROILIAC JOINT RADIOFREQUENCY LATERAL SACRAL NERVES S1 S2 S3 performed by Jason Hernandez DO at [facility] DX/THER SBST INTRLMNR LMBR/SAC W/IMG GDN N/A 9/11/2018    BILATERAL LUMBAR TRANSFORAMINAL NERVE BLOCK  #2 L4-5 WITH IV SEDATION performed by Jason Hernandez DO at [facility] NOSE/CLEFT LIP/TIP Left 4/24/2018    LEFT SACROILIAC JOINT INJECTION #1 WITH IV SEDATION performed by Jason Hernandez DO at [address] [facility] NOSE/CLEFT LIP/TIP Left 5/1/2018    LEFT SACROILIAC JOINT INJECTION #2 WITH IV SEDATION performed by Jason Hernandez DO at [facility]  2005    2 neck surgeries & 1 -LB surgery         Family History   Problem Relation Age of Onset    Dementia Mother     Alzheimer's Disease Mother     Parkinsonism Father     Brain Cancer Brother         tumor    Lung Cancer Brother        CareTeam (Including outside providers/suppliers regularly involved in providing care):   Patient Care Team:  Andressa Hoang MD as PCP - Heather Quiroz MD as PCP - [facility] Empaneled Provider    Wt Readings from Last 3 Encounters:   10/08/20 231 lb (104.8 kg)   11/18/19 231 lb (104.8 kg)   10/30/19 227 lb (103 kg)     Vitals:    10/08/20 1007   BP: 136/70   Pulse: 103   Resp: 16   SpO2: 97%   Weight: 231 lb (104.8 kg)   Height: 5' 5.5\" (1.664 m)     Body mass index is 37.86 kg/m². Based upon direct observation of the patient, evaluation of cognition reveals recent and remote memory intact    Patient's complete Health Risk Assessment and screening values have been reviewed and are found in Flowsheets. The following problems were reviewed today and where indicated follow up appointments were made and/or referrals ordered. Positive Risk Factor Screenings with Interventions:     Fall Risk:  Timed Up and Go Test > 12 seconds? (Complete if either Fall Risk answers are Yes): no  2 or more falls in past year?: (!) yes  Fall with injury in past year?: no  Fall Risk Interventions:    · Fall risk preventions reviewed. Depression:  Depression Unable to Assess: Functional capacity motivation limits accuracy  PHQ-2 Score: 0  PHQ-9 Total Score: 0    Severity:1-4 = minimal depression, 5-9 = mild depression, 10-14 = moderate depression, 15-19 = moderately severe depression, 20-27 = severe depression    Substance History:  Social History     Tobacco History     Smoking Status  Current Every Day Smoker Smoking Start Date  5/22/2007 Smoking Frequency  1 pack/day for 10 years (10 pk yrs)    Smokeless Tobacco Use  Never Used          Alcohol History     Alcohol Use Status  No          Drug Use     Drug Use Status  No          Sexual Activity     Sexually Active  Not Asked               Alcohol Screening:       A score of 8 or more is associated with harmful or hazardous drinking. A score of 13 or more in women, and 15 or more in men, is likely to indicate alcohol dependence.   Substance Abuse Interventions:  · The patient states she has quit smoking. General Health and ACP:  General  In general, how would you say your health is?: Fair  In the past 7 days, have you experienced any of the following? New or Increased Pain, New or Increased Fatigue, Loneliness, Social Isolation, Stress or Anger?: (!) New or Increased Pain, New or Increased Fatigue, Social Isolation, Loneliness, Stress  Do you get the social and emotional support that you need?: Yes  Do you have a Living Will?: (!) No  Advance Directives     Power of  Living Will ACP-Advance Directive ACP-Power of     Not on File Not on File Filed [address] Risk Interventions:  · Living will recommended. The patient states from a depressive standpoint she is doing well. Health Habits/Nutrition:  Health Habits/Nutrition  Do you exercise for at least 20 minutes 2-3 times per week?: (!) No  Have you lost any weight without trying in the past 3 months?: No  Do you eat fewer than 2 meals per day?: No  Have you seen a dentist within the past year?: (!) No  Body mass index: (!) 37.85  Health Habits/Nutrition Interventions:  · Health habits/nutrition interventions reviewed. Hearing/Vision:  No exam data present  Hearing/Vision  Do you or your family notice any trouble with your hearing?: No  Do you have difficulty driving, watching TV, or doing any of your daily activities because of your eyesight?: No  Have you had an eye exam within the past year?: (!) No  Hearing/Vision Interventions:  · Recommend annual eye examination. Safety:  Safety  Do you have working smoke detectors?: Yes  Have all throw rugs been removed or fastened?: Yes  Do you have non-slip mats or surfaces in all bathtubs/showers?: (!) No  Do all of your stairways have a railing or banister?: Yes  Are your doorways, halls and stairs free of clutter?: (!) No  Do you always fasten your seatbelt when you are in a car?: Yes  Safety Interventions:  · Home safety measures reviewed.     Personalized Preventive

## 2020-11-02 ENCOUNTER — TELEPHONE (OUTPATIENT)
Dept: ADMINISTRATIVE | Age: 65
End: 2020-11-02

## 2020-11-06 ENCOUNTER — TELEPHONE (OUTPATIENT)
Dept: FAMILY MEDICINE CLINIC | Age: 65
End: 2020-11-06

## 2020-11-30 ENCOUNTER — HOSPITAL ENCOUNTER (OUTPATIENT)
Dept: MAMMOGRAPHY | Age: 65
Discharge: HOME | End: 2020-12-02
Payer: COMMERCIAL

## 2021-02-02 ENCOUNTER — TELEPHONE (OUTPATIENT)
Dept: PRIMARY CARE CLINIC | Age: 66
End: 2021-02-02

## 2021-02-02 RX ORDER — ATORVASTATIN CALCIUM 40 MG/1
TABLET, FILM COATED ORAL
Qty: 30 TABLET | Refills: 3 | Status: SHIPPED
Start: 2021-02-02 | End: 2021-05-19 | Stop reason: SDUPTHER

## 2021-02-02 RX ORDER — MELATONIN
Qty: 30 TABLET | Refills: 3 | Status: SHIPPED | OUTPATIENT
Start: 2021-02-02 | End: 2022-02-17 | Stop reason: SDUPTHER

## 2021-02-02 NOTE — TELEPHONE ENCOUNTER
Last Appointment:  Visit date not found  Future Appointments   Date Time Provider Sushma Russell   2/10/2021  2:00 PM Emmie Paredes MD [facility]   10/12/2021 11:10 AM Emmie Paredes MD [address] [place] [address]

## 2021-02-17 ENCOUNTER — OFFICE VISIT (OUTPATIENT)
Dept: PRIMARY CARE CLINIC | Age: 66
End: 2021-02-17
Payer: COMMERCIAL

## 2021-02-17 VITALS
TEMPERATURE: 97.6 F | BODY MASS INDEX: 40.98 KG/M2 | HEIGHT: 65 IN | DIASTOLIC BLOOD PRESSURE: 92 MMHG | SYSTOLIC BLOOD PRESSURE: 140 MMHG | HEART RATE: 94 BPM | OXYGEN SATURATION: 97 % | RESPIRATION RATE: 16 BRPM | WEIGHT: 246 LBS

## 2021-02-17 DIAGNOSIS — J44.9 CHRONIC OBSTRUCTIVE PULMONARY DISEASE, UNSPECIFIED COPD TYPE (HCC): ICD-10-CM

## 2021-02-17 DIAGNOSIS — E78.5 HYPERLIPIDEMIA, UNSPECIFIED HYPERLIPIDEMIA TYPE: ICD-10-CM

## 2021-02-17 DIAGNOSIS — I10 BENIGN ESSENTIAL HYPERTENSION: Primary | ICD-10-CM

## 2021-02-17 DIAGNOSIS — E55.9 VITAMIN D DEFICIENCY: ICD-10-CM

## 2021-02-17 DIAGNOSIS — Z11.59 ENCOUNTER FOR HEPATITIS C SCREENING TEST FOR LOW RISK PATIENT: ICD-10-CM

## 2021-02-17 PROCEDURE — G0009 ADMIN PNEUMOCOCCAL VACCINE: HCPCS | Performed by: FAMILY MEDICINE

## 2021-02-17 PROCEDURE — 1090F PRES/ABSN URINE INCON ASSESS: CPT | Performed by: FAMILY MEDICINE

## 2021-02-17 PROCEDURE — G8417 CALC BMI ABV UP PARAM F/U: HCPCS | Performed by: FAMILY MEDICINE

## 2021-02-17 PROCEDURE — 99214 OFFICE O/P EST MOD 30 MIN: CPT | Performed by: FAMILY MEDICINE

## 2021-02-17 PROCEDURE — 3017F COLORECTAL CA SCREEN DOC REV: CPT | Performed by: FAMILY MEDICINE

## 2021-02-17 PROCEDURE — 4040F PNEUMOC VAC/ADMIN/RCVD: CPT | Performed by: FAMILY MEDICINE

## 2021-02-17 PROCEDURE — G8400 PT W/DXA NO RESULTS DOC: HCPCS | Performed by: FAMILY MEDICINE

## 2021-02-17 PROCEDURE — 4004F PT TOBACCO SCREEN RCVD TLK: CPT | Performed by: FAMILY MEDICINE

## 2021-02-17 PROCEDURE — 1123F ACP DISCUSS/DSCN MKR DOCD: CPT | Performed by: FAMILY MEDICINE

## 2021-02-17 PROCEDURE — 90732 PPSV23 VACC 2 YRS+ SUBQ/IM: CPT | Performed by: FAMILY MEDICINE

## 2021-02-17 PROCEDURE — G8484 FLU IMMUNIZE NO ADMIN: HCPCS | Performed by: FAMILY MEDICINE

## 2021-02-17 PROCEDURE — 3023F SPIROM DOC REV: CPT | Performed by: FAMILY MEDICINE

## 2021-02-17 PROCEDURE — G8427 DOCREV CUR MEDS BY ELIG CLIN: HCPCS | Performed by: FAMILY MEDICINE

## 2021-02-17 PROCEDURE — G8926 SPIRO NO PERF OR DOC: HCPCS | Performed by: FAMILY MEDICINE

## 2021-02-17 RX ORDER — MULTIVITAMIN WITH IRON
250 TABLET ORAL DAILY
COMMUNITY

## 2021-02-17 RX ORDER — FERROUS SULFATE 325(65) MG
325 TABLET ORAL
COMMUNITY

## 2021-02-17 RX ORDER — DESVENLAFAXINE 50 MG/1
50 TABLET, EXTENDED RELEASE ORAL DAILY
COMMUNITY

## 2021-02-17 NOTE — PROGRESS NOTES
2021     Elena Sales    : 1955 Sex: female   Age: 72 y.o. Chief Complaint   Patient presents with    Hypertension    Hyperlipidemia       HPI: This 72y.o. -year-old female  presents today for evaluation and management of her  chronic medical problems. Current medication list reviewed. The patient is tolerating all medications well without adverse events or known side effects. The patient does understand the risk and benefits of the prescribed medications. The patient is not up-to-date on all age-appropriate wellness issues. ROS:   Const: Denies changes in appetite, chills, fever, night sweats and weight loss. Eyes:  Denies discharge, a recent change in visual acuity, blurred vision and double vision. ENMT: Denies discharge of the ears, hearing loss, pain of the ears. Denies nasal or sinus symptoms other than stated above. Denies mouth or throat symptoms. CV:  Denies chest pain, dyspnea on exertion, orthopnea, palpitations and PND  Resp: Denies chest pain, cough, SOB and wheezing. GI: Denies abdominal pain, constipation, diarrhea, heartburn, indigestion, nausea and vomiting. : Denies dysuria, frequency, hematuria, nocturia and urgency. Musculo: Denies arthralgias and myalgia  Skin:  Denies lesions, pruritus and rash. Neuro: Denies dizziness, lightheadedness, numbness, tingling and weakness. Psych:  Denies anxiety and depression  Endocrine: Denies polyuria, polydipsia, polyphagia, weight gain, dry skin, constipation, fatigue, cold intolerance, heat intolerance or tremors. Hema/Lymph: Denies hematologic symptoms  Allergy/Immuno:  Denies allergic/immunologic symptoms.   Pertinent positives reviewed and noted      Current Outpatient Medications:     desvenlafaxine succinate (PRISTIQ) 50 MG TB24 extended release tablet, Take 50 mg by mouth daily, Disp: , Rfl:     Multiple Vitamins-Minerals (MULTIVITAMIN WOMEN 50+ PO), Take by mouth, Disp: , Rfl:     magnesium (MAGNESIUM-OXIDE) Take 340 mg by mouth, Disp: , Rfl:     buPROPion (WELLBUTRIN XL) 150 MG extended release tablet, TAKE ONE TABLET BY MOUTH EVERY MORNING, Disp: , Rfl: 1    VENTOLIN  (90 Base) MCG/ACT inhaler, Inhale 1 puff into the lungs every 4 hours as needed (EVERY 4 HOURS PRN), Disp: 1 Inhaler, Rfl: 2    Allergies   Allergen Reactions    Latex      With condom     Amlodipine Besylate     Diphenhydramine      Does the opposite    wired    Sulfa Antibiotics      As a child    Vistaril [Hydroxyzine Hcl]      \"does the complete opposite\"       Past Medical History:   Diagnosis Date    Asthma     [facility]     Cancer ([facility]     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 Boni Campbell    Chronic back pain     upper, mid and lower back    Dementia ([facility]     Depression     bipolar    H/O being hospitalized     for cervical, thoracic, and lumbar pain, at [address] H/O colonoscopy     Head injury two years ago    Headache(784.0)     8/10 severity    Hyperlipidemia     Memory difficulties     mispelling words, dialing wrong numbers    Migraine     10/10 severity, 6 per year    Numbness and tingling     both arms    OCD (obsessive compulsive disorder)      Social History     Socioeconomic History    Marital status: Single     Spouse name: Not on file    Number of children: Not on file    Years of education: Not on file    Highest education level: Not on file   Occupational History    Not on file   Social Needs    Financial resource strain: Not on file    Food insecurity     Worry: Not on file     Inability: Not on file    Transportation needs     Medical: Not on file     Non-medical: Not on file   Tobacco Use    Smoking status: Current Every Day Smoker     Packs/day: 1.00     Years: 10.00     Pack years: 10.00     Start date: 5/22/2007    Smokeless tobacco: Never Used   Substance and Sexual Activity    Alcohol use: No    Drug use: No    Sexual activity: Not on file   Lifestyle    Physical activity     Days per week: Not on file     Minutes per session: Not on file    Stress: Not on file   Relationships    Social connections     Talks on phone: Not on file     Gets together: Not on file     Attends Alevism service: Not on file     Active member of club or organization: Not on file     Attends meetings of clubs or organizations: Not on file     Relationship status: Not on file    Intimate partner violence     Fear of current or ex partner: Not on file     Emotionally abused: Not on file     Physically abused: Not on file     Forced sexual activity: Not on file   Other Topics Concern    Not on file   Social History Narrative    Not on file     Past Surgical History:   Procedure Laterality Date    ANESTHESIA NERVE BLOCK Left 4/9/2019    LEFT TRANSFORAMINAL LUMBAR NERVE BLOCK AT L4-5 UNDER X-RAY WITH IV SEDATION performed by Tish Poe DO at [address] [place] [address] Dr joe Allen [address] Bilateral 8/28/2018    LUMBAR TRANSFORAMINAL L4-5 performed by Tish [facility]oe DO at [facility] Left 04/24/2018    left sacroiliac joint injection #1 with IV sedation    NERVE BLOCK Left 05/01/2018    left sacroiliac joint injection #2    NERVE BLOCK Left 07/24/2018    NERVE BLOCK Bilateral 08/28/2018    transforaminal injection    NERVE BLOCK  09/11/2018    NERVE BLOCK Left 04/09/2019    with sedation    [place] NERVOUS SYSTEM SURGERY UNLISTED Left 7/24/2018    LEFT SACROILIAC JOINT RADIOFREQUENCY LATERAL SACRAL NERVES S1 S2 S3 performed by Tish Poe DO at [facility] DX/THER SBST INTRLMNR LMBR/SAC W/IMG GDN N/A 9/11/2018    BILATERAL LUMBAR TRANSFORAMINAL NERVE BLOCK  #2 L4-5 WITH IV SEDATION performed by Tish Poe DO at [address] [place] [address] NOSE/CLEFT LIP/TIP Left 4/24/2018    LEFT SACROILIAC JOINT INJECTION #1 WITH IV SEDATION performed by Vahid Alvarez DO at [address] [facility] NOSE/CLEFT LIP/TIP Left 5/1/2018    LEFT SACROILIAC JOINT INJECTION #2 WITH IV SEDATION performed by Vahid Alvarez DO at [facility]  2005    2 neck surgeries & 1 -LB surgery      Family History   Problem Relation Age of Onset    Dementia Mother     Alzheimer's Disease Mother     Parkinsonism Father     Brain Cancer Brother         tumor    Lung Cancer Brother         Vitals:    02/17/21 1123 02/17/21 1136   BP: (!) 140/92 (!) 140/92   Pulse: 94    Resp: 16    Temp: 97.6 °F (36.4 °C)    TempSrc: Temporal    SpO2: 97%    Weight: 246 lb (111.6 kg)    Height: 5' 5\" (1.651 m)         Exam: Const: Appears healthy and well developed. No signs of acute distress present. Eyes: PERRL  ENMT: Tympanic membranes are intact. Nasal mucosa intact without noted erythema Septum is in the midline. Posterior pharynx shows no exudate, irritation or redness. Neck:  Supple without adenopathy. Adequate range of motion   Resp: No rales, rhonchi, wheezes appreciated over the lungs bilaterally. CV: S1, S2 within normal limits. Regular rate and rhythm noted. Without murmur, gallop or rub. Extremities:  Pulses intact. Without noted edema. Abdomen: Positive bowel sounds. Palpation reveals softness, with no distension, organomegaly or tenderness. No abdominal masses palpable. Skin: Skin is warm and dry. Musculo: Unchanged upon examination. Neuro: Alert and oriented X3. Cranial nerves grossly intact. Psych: Mood is normal.  Affect is normal.   Vital signs reviewed. Controlled Substances Monitoring:     No flowsheet data found. Plan Per Assessment:  Tavo Casillas was seen today for hypertension and hyperlipidemia.     Diagnoses and all orders for this visit:    Benign essential hypertension    Hyperlipidemia, unspecified hyperlipidemia type    Vitamin D deficiency    Chronic obstructive pulmonary disease, unspecified COPD type [facility])    Encounter for hepatitis C screening test for low risk patient    Other orders  -     Pneumococcal polysaccharide vaccine 23-valent greater than or equal to 1yo subcutaneous/IM      Repeat blood pressure. Return in about 3 months (around 5/17/2021) for MEDICATION CHECK, FOLLOW UP [address] Martita Chung MD    Note was generated with the assistance of voice recognition software. Document was reviewed however may contain grammatical errors.

## 2021-05-19 ENCOUNTER — OFFICE VISIT (OUTPATIENT)
Dept: PRIMARY CARE CLINIC | Age: 66
End: 2021-05-19
Payer: COMMERCIAL

## 2021-05-19 VITALS
WEIGHT: 246 LBS | DIASTOLIC BLOOD PRESSURE: 80 MMHG | SYSTOLIC BLOOD PRESSURE: 130 MMHG | HEIGHT: 65 IN | BODY MASS INDEX: 40.98 KG/M2 | TEMPERATURE: 98.6 F | OXYGEN SATURATION: 93 % | HEART RATE: 95 BPM | RESPIRATION RATE: 16 BRPM

## 2021-05-19 DIAGNOSIS — E78.5 HYPERLIPIDEMIA, UNSPECIFIED HYPERLIPIDEMIA TYPE: Primary | ICD-10-CM

## 2021-05-19 DIAGNOSIS — R42 DIZZINESS: ICD-10-CM

## 2021-05-19 DIAGNOSIS — J44.9 CHRONIC OBSTRUCTIVE PULMONARY DISEASE, UNSPECIFIED COPD TYPE (HCC): ICD-10-CM

## 2021-05-19 DIAGNOSIS — Z12.31 ENCOUNTER FOR SCREENING MAMMOGRAM FOR MALIGNANT NEOPLASM OF BREAST: ICD-10-CM

## 2021-05-19 PROBLEM — F31.9 BIPOLAR DISORDER, UNSPECIFIED (HCC): Status: ACTIVE | Noted: 2020-06-18

## 2021-05-19 PROBLEM — F41.9 ANXIETY DISORDER, UNSPECIFIED: Status: ACTIVE | Noted: 2020-06-18

## 2021-05-19 LAB
ALBUMIN SERPL-MCNC: 4 G/DL (ref 3.5–5.2)
ALP BLD-CCNC: 100 U/L (ref 35–104)
ALT SERPL-CCNC: 18 U/L (ref 0–32)
ANION GAP SERPL CALCULATED.3IONS-SCNC: 18 MMOL/L (ref 7–16)
AST SERPL-CCNC: 26 U/L (ref 0–31)
BASOPHILS ABSOLUTE: 0.03 E9/L (ref 0–0.2)
BASOPHILS RELATIVE PERCENT: 0.4 % (ref 0–2)
BILIRUB SERPL-MCNC: 0.2 MG/DL (ref 0–1.2)
BUN BLDV-MCNC: 8 MG/DL (ref 6–23)
CALCIUM SERPL-MCNC: 9.4 MG/DL (ref 8.6–10.2)
CHLORIDE BLD-SCNC: 100 MMOL/L (ref 98–107)
CO2: 23 MMOL/L (ref 22–29)
CREAT SERPL-MCNC: 0.7 MG/DL (ref 0.5–1)
EOSINOPHILS ABSOLUTE: 0.37 E9/L (ref 0.05–0.5)
EOSINOPHILS RELATIVE PERCENT: 5.2 % (ref 0–6)
GFR AFRICAN AMERICAN: >60
GFR NON-AFRICAN AMERICAN: >60 ML/MIN/1.73
GLUCOSE BLD-MCNC: 100 MG/DL (ref 74–99)
HCT VFR BLD CALC: 40.9 % (ref 34–48)
HEMOGLOBIN: 13 G/DL (ref 11.5–15.5)
IMMATURE GRANULOCYTES #: 0.05 E9/L
IMMATURE GRANULOCYTES %: 0.7 % (ref 0–5)
LYMPHOCYTES ABSOLUTE: 2.33 E9/L (ref 1.5–4)
LYMPHOCYTES RELATIVE PERCENT: 32.7 % (ref 20–42)
MCH RBC QN AUTO: 32.2 PG (ref 26–35)
MCHC RBC AUTO-ENTMCNC: 31.8 % (ref 32–34.5)
MCV RBC AUTO: 101.2 FL (ref 80–99.9)
MONOCYTES ABSOLUTE: 0.44 E9/L (ref 0.1–0.95)
MONOCYTES RELATIVE PERCENT: 6.2 % (ref 2–12)
NEUTROPHILS ABSOLUTE: 3.9 E9/L (ref 1.8–7.3)
NEUTROPHILS RELATIVE PERCENT: 54.8 % (ref 43–80)
PDW BLD-RTO: 14.9 FL (ref 11.5–15)
PLATELET # BLD: 345 E9/L (ref 130–450)
PMV BLD AUTO: 10.8 FL (ref 7–12)
POTASSIUM SERPL-SCNC: 4 MMOL/L (ref 3.5–5)
RBC # BLD: 4.04 E12/L (ref 3.5–5.5)
SODIUM BLD-SCNC: 141 MMOL/L (ref 132–146)
TOTAL PROTEIN: 7 G/DL (ref 6.4–8.3)
WBC # BLD: 7.1 E9/L (ref 4.5–11.5)

## 2021-05-19 PROCEDURE — 3017F COLORECTAL CA SCREEN DOC REV: CPT | Performed by: FAMILY MEDICINE

## 2021-05-19 PROCEDURE — G8417 CALC BMI ABV UP PARAM F/U: HCPCS | Performed by: FAMILY MEDICINE

## 2021-05-19 PROCEDURE — 1123F ACP DISCUSS/DSCN MKR DOCD: CPT | Performed by: FAMILY MEDICINE

## 2021-05-19 PROCEDURE — 4004F PT TOBACCO SCREEN RCVD TLK: CPT | Performed by: FAMILY MEDICINE

## 2021-05-19 PROCEDURE — 3023F SPIROM DOC REV: CPT | Performed by: FAMILY MEDICINE

## 2021-05-19 PROCEDURE — 99214 OFFICE O/P EST MOD 30 MIN: CPT | Performed by: FAMILY MEDICINE

## 2021-05-19 PROCEDURE — 4040F PNEUMOC VAC/ADMIN/RCVD: CPT | Performed by: FAMILY MEDICINE

## 2021-05-19 PROCEDURE — G8427 DOCREV CUR MEDS BY ELIG CLIN: HCPCS | Performed by: FAMILY MEDICINE

## 2021-05-19 PROCEDURE — G8926 SPIRO NO PERF OR DOC: HCPCS | Performed by: FAMILY MEDICINE

## 2021-05-19 PROCEDURE — G8400 PT W/DXA NO RESULTS DOC: HCPCS | Performed by: FAMILY MEDICINE

## 2021-05-19 PROCEDURE — 1090F PRES/ABSN URINE INCON ASSESS: CPT | Performed by: FAMILY MEDICINE

## 2021-05-19 RX ORDER — ATORVASTATIN CALCIUM 40 MG/1
TABLET, FILM COATED ORAL
Qty: 30 TABLET | Refills: 3 | Status: SHIPPED | OUTPATIENT
Start: 2021-05-19 | End: 2021-09-02 | Stop reason: SDUPTHER

## 2021-05-19 NOTE — PROGRESS NOTES
2021     Elena Sales    : 1955 Sex: female   Age: 77 y.o. Chief Complaint   Patient presents with    Dizziness     Had a fall       HPI: This 77y.o. -year-old female  presents today for evaluation and management of her  chronic medical problems. Current medication list reviewed. The patient is tolerating all medications well without adverse events or known side effects. The patient does understand the risk and benefits of the prescribed medications. The patient is not up-to-date on all age-appropriate wellness issues. Patient states he has been experiencing dizziness since Friday. The patient did sustain 2 falls this weekend. The patient denies any chest pain or heart palpitations. ROS:   Const: Denies changes in appetite, chills, fever, night sweats and weight loss. Eyes:  Denies discharge, a recent change in visual acuity, blurred vision and double vision. ENMT: Denies discharge of the ears, hearing loss, pain of the ears. Denies nasal or sinus symptoms other than stated above. Denies mouth or throat symptoms. CV:  Denies chest pain, dyspnea on exertion, orthopnea, palpitations and PND  Resp: Denies chest pain, cough, SOB and wheezing. GI: Denies abdominal pain, constipation, diarrhea, heartburn, indigestion, nausea and vomiting. : Denies dysuria, frequency, hematuria, nocturia and urgency. Musculo: Denies arthralgias and myalgia  Skin:  Denies lesions, pruritus and rash. Neuro: Denies dizziness, lightheadedness, numbness, tingling and weakness. Psych:  Denies anxiety and depression  Endocrine: Denies polyuria, polydipsia, polyphagia, weight gain, dry skin, constipation, fatigue, cold intolerance, heat intolerance or tremors. Hema/Lymph: Denies hematologic symptoms  Allergy/Immuno:  Denies allergic/immunologic symptoms.   Pertinent positives reviewed and noted      Current Outpatient Medications:     atorvastatin (LIPITOR) 40 MG tablet, TAKE ONE TABLET BY MOUTH EVERY DAY, acetaminophen (TYLENOL) 500 MG tablet, Take 500 mg by mouth every 6 hours as needed for Pain Indications: Four 500mg QD, Disp: , Rfl:     lamoTRIgine (LAMICTAL) 200 MG tablet, Take 200 mg by mouth daily , Disp: , Rfl:     LORazepam (ATIVAN) 1 MG tablet, Take 1 mg by mouth every 8 hours as needed. , Disp: , Rfl:     hydrocortisone 2.5 % cream, Apply  topically as needed. Apply topically 2 times daily. 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 Castro Garcia    Chronic back pain     upper, mid and lower back    Dementia ([facility]     Depression     bipolar    H/O being hospitalized     for cervical, thoracic, and lumbar pain, at [address] H/O colonoscopy     Head injury two years ago    Headache(784.0)     8/10 severity    Hyperlipidemia     Memory difficulties     mispelling words, dialing wrong numbers    Migraine     10/10 severity, 6 per year    Numbness and tingling     both arms    OCD (obsessive compulsive disorder)      Social History     Socioeconomic History    Marital status: Single     Spouse name: Not on file    Number of children: Not on file    Years of education: Not on file    Highest education level: Not on file   Occupational History    Not on file   Tobacco Use    Smoking status: Current Every Day Smoker     Packs/day: 1.00     Years: 10.00     Pack years: 10.00     Start date: 5/22/2007    Smokeless tobacco: Never Used   Vaping Use    Vaping Use: Never used   Substance and Sexual Activity    Alcohol use: No    Drug use: No    Sexual activity: Not on file Other Topics Concern    Not on file   Social History Narrative    Not on file     Social Determinants of Health     Financial Resource Strain:     Difficulty of Paying Living Expenses:    Food Insecurity:     Worried About Running Out of Food in the Last Year:     [address] in the Last Year:    Transportation Needs:     Lack of Transportation (Medical):      Lack of Transportation (Non-Medical):    Physical Activity:     Days of Exercise per Week:     Minutes of Exercise per Session:    Stress:     Feeling of Stress :    Social Connections:     Frequency of Communication with Friends and Family:     Frequency of Social Gatherings with Friends and Family:     Attends Sabianist Services:     Active Member of Clubs or Organizations:     Attends Club or Organization Meetings:     Marital Status:    Intimate Partner Violence:     Fear of Current or Ex-Partner:     Emotionally Abused:     Physically Abused:     Sexually Abused:      Past Surgical History:   Procedure Laterality Date    ANESTHESIA NERVE BLOCK Left 4/9/2019    LEFT TRANSFORAMINAL LUMBAR NERVE BLOCK AT L4-5 UNDER X-RAY WITH IV SEDATION performed by Mary Jane Rios DO at [address] [place] [address] Dr joe Allen 39 Bilateral 8/28/2018    LUMBAR TRANSFORAMINAL L4-5 performed by Mary Jane Rios DO at [facility] Left 04/24/2018    left sacroiliac joint injection #1 with IV sedation    NERVE BLOCK Left 05/01/2018    left sacroiliac joint injection #2    NERVE BLOCK Left 07/24/2018    NERVE BLOCK Bilateral 08/28/2018    transforaminal injection    NERVE BLOCK  09/11/2018    NERVE BLOCK Left 04/09/2019    with sedation    [place] NERVOUS SYSTEM SURGERY UNLISTED Left 7/24/2018    LEFT SACROILIAC JOINT RADIOFREQUENCY LATERAL SACRAL NERVES S1 S2 S3 performed by Mary Jane Rios DO at [address] [place] [address] DX/THER SBST INTRLMNR LMBR/SAC W/IMG SHAUNN N/A 9/11/2018    BILATERAL LUMBAR TRANSFORAMINAL NERVE BLOCK  #2 L4-5 WITH IV SEDATION performed by Freddy Bailey DO at [address] NOSE/CLEFT LIP/TIP Left 4/24/2018    LEFT SACROILIAC JOINT INJECTION #1 WITH IV SEDATION performed by Freddy Bailey DO at [address] [facility] NOSE/CLEFT LIP/TIP Left 5/1/2018    LEFT SACROILIAC JOINT INJECTION #2 WITH IV SEDATION performed by Freddy Bailey DO at [facility]  2005    2 neck surgeries & 1 -LB surgery      Family History   Problem Relation Age of Onset    Dementia Mother    Jerry Warner Alzheimer's Disease Mother     Parkinsonism Father     Brain Cancer Brother         tumor    Lung Cancer Brother         Vitals:    05/19/21 1354   BP: 130/80   Pulse: 95   Resp: 16   Temp: 98.6 °F (37 °C)   SpO2: 93%   Weight: 246 lb (111.6 kg)   Height: 5' 5\" (1.651 m)        Exam: Const: Appears healthy and well developed. No signs of acute distress present. Eyes: PERRL  ENMT: Tympanic membranes are intact. Nasal mucosa intact without noted erythema Septum is in the midline. Posterior pharynx shows no exudate, irritation or redness. Neck:  Supple without adenopathy. Adequate range of motion   Resp: No rales, rhonchi, wheezes appreciated over the lungs bilaterally. CV: S1, S2 within normal limits. Regular rate and rhythm noted. Without murmur, gallop or rub. Extremities:  Pulses intact. Without noted edema. Abdomen: Positive bowel sounds. Palpation reveals softness, with no distension, organomegaly or tenderness. No abdominal masses palpable. Skin: Skin is warm and dry. Musculo: Unchanged upon examination. Neuro: Alert and oriented X3. Cranial nerves grossly intact. Psych: Mood is normal.  Affect is normal.   Vital signs reviewed. Controlled Substances Monitoring:     No flowsheet data found. Plan Per Assessment:  Hannah Barrow was seen today for dizziness.     Diagnoses and all orders for this visit:    Hyperlipidemia, unspecified hyperlipidemia type  -     atorvastatin (LIPITOR) 40 MG tablet; TAKE ONE TABLET BY MOUTH EVERY DAY    Dizziness  -     Comprehensive Metabolic Panel; Future  -     CBC Auto Differential; Future    Chronic obstructive pulmonary disease, unspecified COPD type ([facility]    Encounter for screening mammogram for malignant neoplasm of breast  -     PARESH SCREENING W CAD BILATERAL 2 VW; Future        Return in about 3 months (around 8/19/2021) for MEDICATION CHECK, FOLLOW UP [facility] Lona Halsted, MD    Note was generated with the assistance of voice recognition software. Document was reviewed however may contain grammatical errors.

## 2021-05-25 ENCOUNTER — TELEPHONE (OUTPATIENT)
Dept: FAMILY MEDICINE CLINIC | Age: 66
End: 2021-05-25

## 2021-05-25 NOTE — TELEPHONE ENCOUNTER
If she is still having dizziness have her schedule another office visit follow-up next week. Has she experienced any more falls?

## 2021-09-02 ENCOUNTER — OFFICE VISIT (OUTPATIENT)
Dept: PRIMARY CARE CLINIC | Age: 66
End: 2021-09-02
Payer: COMMERCIAL

## 2021-09-02 VITALS
OXYGEN SATURATION: 95 % | SYSTOLIC BLOOD PRESSURE: 130 MMHG | WEIGHT: 246 LBS | DIASTOLIC BLOOD PRESSURE: 84 MMHG | HEIGHT: 65 IN | HEART RATE: 97 BPM | TEMPERATURE: 98 F | BODY MASS INDEX: 40.98 KG/M2

## 2021-09-02 DIAGNOSIS — M54.50 CHRONIC MIDLINE LOW BACK PAIN WITHOUT SCIATICA: ICD-10-CM

## 2021-09-02 DIAGNOSIS — Z98.890 HISTORY OF LUMBAR SURGERY: ICD-10-CM

## 2021-09-02 DIAGNOSIS — Z12.31 ENCOUNTER FOR SCREENING MAMMOGRAM FOR MALIGNANT NEOPLASM OF BREAST: ICD-10-CM

## 2021-09-02 DIAGNOSIS — G89.29 CHRONIC MIDLINE LOW BACK PAIN WITHOUT SCIATICA: ICD-10-CM

## 2021-09-02 DIAGNOSIS — R06.02 SHORTNESS OF BREATH: ICD-10-CM

## 2021-09-02 DIAGNOSIS — Z12.11 COLON CANCER SCREENING: ICD-10-CM

## 2021-09-02 DIAGNOSIS — G89.29 CHRONIC LEFT SHOULDER PAIN: ICD-10-CM

## 2021-09-02 DIAGNOSIS — E78.2 MIXED HYPERLIPIDEMIA: ICD-10-CM

## 2021-09-02 DIAGNOSIS — Z98.1 HISTORY OF FUSION OF CERVICAL SPINE: ICD-10-CM

## 2021-09-02 DIAGNOSIS — F31.81 BIPOLAR 2 DISORDER (HCC): Primary | ICD-10-CM

## 2021-09-02 DIAGNOSIS — M25.512 CHRONIC LEFT SHOULDER PAIN: ICD-10-CM

## 2021-09-02 DIAGNOSIS — Z91.81 AT HIGH RISK FOR FALLS: ICD-10-CM

## 2021-09-02 DIAGNOSIS — Z72.0 TOBACCO USE: ICD-10-CM

## 2021-09-02 PROCEDURE — 1090F PRES/ABSN URINE INCON ASSESS: CPT | Performed by: FAMILY MEDICINE

## 2021-09-02 PROCEDURE — 4040F PNEUMOC VAC/ADMIN/RCVD: CPT | Performed by: FAMILY MEDICINE

## 2021-09-02 PROCEDURE — 3017F COLORECTAL CA SCREEN DOC REV: CPT | Performed by: FAMILY MEDICINE

## 2021-09-02 PROCEDURE — 99214 OFFICE O/P EST MOD 30 MIN: CPT | Performed by: FAMILY MEDICINE

## 2021-09-02 PROCEDURE — 1123F ACP DISCUSS/DSCN MKR DOCD: CPT | Performed by: FAMILY MEDICINE

## 2021-09-02 PROCEDURE — 4004F PT TOBACCO SCREEN RCVD TLK: CPT | Performed by: FAMILY MEDICINE

## 2021-09-02 PROCEDURE — G8417 CALC BMI ABV UP PARAM F/U: HCPCS | Performed by: FAMILY MEDICINE

## 2021-09-02 PROCEDURE — G8400 PT W/DXA NO RESULTS DOC: HCPCS | Performed by: FAMILY MEDICINE

## 2021-09-02 PROCEDURE — G8427 DOCREV CUR MEDS BY ELIG CLIN: HCPCS | Performed by: FAMILY MEDICINE

## 2021-09-02 RX ORDER — ARIPIPRAZOLE 2 MG/1
TABLET ORAL
COMMUNITY
Start: 2021-08-19

## 2021-09-02 RX ORDER — ATORVASTATIN CALCIUM 40 MG/1
TABLET, FILM COATED ORAL
Qty: 30 TABLET | Refills: 3 | Status: SHIPPED
Start: 2021-09-02 | End: 2021-12-28

## 2021-09-02 ASSESSMENT — ENCOUNTER SYMPTOMS
SHORTNESS OF BREATH: 1
DIARRHEA: 1
CONSTIPATION: 1

## 2021-09-02 NOTE — PROGRESS NOTES
21  Kanajody Sales : 1955 Sex: female  Age: 77 y.o. Assessment and Plan:  Mason Keller was seen today for new patient. Diagnoses and all orders for this visit:    Bipolar 2 disorder ([address]    At high risk for falls    Chronic left shoulder pain  -     XR SHOULDER LEFT (MIN 2 VIEWS); Future    Encounter for screening mammogram for malignant neoplasm of breast  -     PARESH DIGITAL SCREEN BILATERAL PER PROTOCOL; Future    Colon cancer screening  -     External Referral To Gastroenterology    Chronic midline low back pain without sciatica  -     External Referral To Physical Therapy    Mixed hyperlipidemia  -     atorvastatin (LIPITOR) 40 MG tablet; TAKE ONE TABLET BY MOUTH EVERY DAY  -     Basic Metabolic Panel; Future  -     CBC Auto Differential; Future  -     Lipid Panel; Future  -     Hepatic Function Panel; Future    Shortness of breath  -     VENTOLIN  (90 Base) MCG/ACT inhaler; Inhale 1 puff into the lungs every 4 hours as needed (EVERY 4 HOURS PRN)  -     Full PFT Study With Bronchodilator; Future    Tobacco use  -     Full PFT Study With Bronchodilator; Future    History of lumbar surgery    History of fusion of cervical spine    Check x-ray left shoulder. Refer to physical therapy for both the shoulder and the back. Refer for pulmonary function testing. Continue Ventolin as needed. Update routine blood work. Further recommendations pending results. Update mammogram.  Refer for colon cancer screening. Continue other current medications. Continue follow-up with psychiatry as scheduled. Return in about 3 months (around 2021).         Chief Complaint   Patient presents with    New Patient     Est PCP       HPI  Here for routine f/u and to re-establish with new provider     Having some left shoulder trouble, having a lot of pain and also cannot internally rotate it enough to put her bra on  No inciting injury  She also has chronic low back pain and would like to do some PT as it has helped in the past     Pt follows with Dr. Joann Cavanaugh at [facility]   Dx with bipolar 2  She has been feeling better with new dose of Abilify     HDL - pt thinks she is on atorvastatin despite not being on our list     Problem list reviewed and updated in full with patient today as necessary. A comprehensive ROS was negative, except as documented above. Review of Systems   Respiratory: Positive for shortness of breath. States she gets winded frequently    Cardiovascular: Negative. Gastrointestinal: Positive for constipation and diarrhea. Genitourinary: Negative.     Psychiatric/Behavioral:        Mood has been much better recently in general         Current Outpatient Medications:     ARIPiprazole (ABILIFY) 2 MG tablet, TAKE ONE TABLET BY MOUTH EVERY MORNING, Disp: , Rfl:     atorvastatin (LIPITOR) 40 MG tablet, TAKE ONE TABLET BY MOUTH EVERY DAY, Disp: 30 tablet, Rfl: 3    VENTOLIN  (90 Base) MCG/ACT inhaler, Inhale 1 puff into the lungs every 4 hours as needed (EVERY 4 HOURS PRN), Disp: 1 each, Rfl: 1    desvenlafaxine succinate (PRISTIQ) 50 MG TB24 extended release tablet, Take 50 mg by mouth daily, Disp: , Rfl:     Multiple Vitamins-Minerals (MULTIVITAMIN WOMEN 50+ PO), Take by mouth, Disp: , Rfl:     magnesium (MAGNESIUM-OXIDE) 250 MG TABS tablet, Take 250 mg by mouth daily, Disp: , Rfl:     zinc 50 MG CAPS, Take by mouth, Disp: , Rfl:     ferrous sulfate (IRON 325) 325 (65 Fe) MG tablet, Take 325 mg by mouth daily (with breakfast), Disp: , Rfl:     Multiple Vitamins-Minerals (HAIR SKIN AND NAILS FORMULA) TABS, Take by mouth, Disp: , Rfl:     vitamin D3 (CHOLECALCIFEROL) 25 MCG (1000 UT) TABS tablet, TAKE ONE TABLET BY MOUTH EVERY DAY, Disp: 30 tablet, Rfl: 3    desvenlafaxine succinate (PRISTIQ) 100 MG TB24 extended release tablet, TAKE ONE TABLET BY MOUTH EVERY DAY, Disp: , Rfl:     Calcium Carb-Magnesium Carb (MAGNEBIND 300) 250-300 MG TABS, Take by mouth, Disp: , Rfl:     vitamin C (ASCORBIC ACID) 500 MG tablet, Take 500 mg by mouth daily, Disp: , Rfl:     triamcinolone (KENALOG) 0.1 % cream, Apply twice daily. , Disp: 1 Tube, Rfl: 2    loratadine (CLARITIN) 10 MG tablet, Take 1 tablet by mouth daily, Disp: 30 tablet, Rfl: 5    Omega 3 340 MG CPDR, Take 340 mg by mouth, Disp: , Rfl:     vitamin B-12 (CYANOCOBALAMIN) 1000 MCG tablet, TAKE ONE TABLET BY MOUTH EVERY DAY (Patient taking differently: Take 5,000 mcg by mouth daily ), Disp: 30 tablet, Rfl: 3    GARCINIA CAMBOGIA-CHROMIUM PO, , Disp: , Rfl:     buPROPion (WELLBUTRIN XL) 150 MG extended release tablet, TAKE ONE TABLET BY MOUTH EVERY MORNING, Disp: , Rfl: 1    albuterol sulfate HFA (VENTOLIN HFA) 108 (90 Base) MCG/ACT inhaler, Inhale into the lungs, Disp: , Rfl:     QUEtiapine (SEROQUEL) 400 MG tablet, TAKE TWO TABLETS BY MOUTH AT BEDTIME, Disp: , Rfl: 5    acetaminophen (TYLENOL) 500 MG tablet, Take 500 mg by mouth every 6 hours as needed for Pain Indications: Four 500mg QD, Disp: , Rfl:     lamoTRIgine (LAMICTAL) 200 MG tablet, Take 200 mg by mouth daily , Disp: , Rfl:     LORazepam (ATIVAN) 1 MG tablet, Take 1 mg by mouth every 8 hours as needed. , Disp: , Rfl:     hydrocortisone 2.5 % cream, Apply  topically as needed. Apply topically 2 times daily. , Disp: , Rfl:   Allergies   Allergen Reactions    Latex      With condom     Amlodipine Besylate     Diphenhydramine      Does the opposite    wired    Hydroxyzine     Sulfa Antibiotics      As a child    Vistaril [Hydroxyzine Hcl]      \"does the complete opposite\"       Pt's past medical and surgical history were reviewed and updated as necessary today   Pt's family and social history were reviewed and updated as necessary today    +smoker, 1ppd x 13 years  Lives alone with cat  Does have family around but doesn't see them much; not too close     Vitals:    09/02/21 1504   BP: 130/84   Pulse: 97   Temp: 98 °F (36.7 °C)   SpO2: 95%

## 2021-11-01 ENCOUNTER — TELEPHONE (OUTPATIENT)
Dept: PRIMARY CARE CLINIC | Age: 66
End: 2021-11-01

## 2021-11-01 DIAGNOSIS — R06.02 SHORTNESS OF BREATH: ICD-10-CM

## 2021-11-01 NOTE — TELEPHONE ENCOUNTER
Last Appointment:  9/2/2021  Future Appointments   Date Time Provider Sushma Russell   12/7/2021  3:00 PM Laymon Heimlich,  [facility]

## 2021-11-02 RX ORDER — ALBUTEROL SULFATE 90 UG/1
AEROSOL, METERED RESPIRATORY (INHALATION)
Qty: 8.5 G | Refills: 1 | Status: SHIPPED
Start: 2021-11-02 | End: 2022-08-22 | Stop reason: SDUPTHER

## 2021-12-28 DIAGNOSIS — E78.2 MIXED HYPERLIPIDEMIA: ICD-10-CM

## 2021-12-28 RX ORDER — ATORVASTATIN CALCIUM 40 MG/1
TABLET, FILM COATED ORAL
Qty: 30 TABLET | Refills: 3 | Status: SHIPPED
Start: 2021-12-28 | End: 2022-02-17 | Stop reason: SDUPTHER

## 2022-02-17 ENCOUNTER — OFFICE VISIT (OUTPATIENT)
Dept: PRIMARY CARE CLINIC | Age: 67
End: 2022-02-17
Payer: COMMERCIAL

## 2022-02-17 VITALS
HEART RATE: 78 BPM | RESPIRATION RATE: 20 BRPM | BODY MASS INDEX: 41.65 KG/M2 | HEIGHT: 65 IN | OXYGEN SATURATION: 98 % | SYSTOLIC BLOOD PRESSURE: 112 MMHG | WEIGHT: 250 LBS | TEMPERATURE: 98.2 F | DIASTOLIC BLOOD PRESSURE: 78 MMHG

## 2022-02-17 DIAGNOSIS — J45.20 MILD INTERMITTENT ASTHMA WITHOUT COMPLICATION: ICD-10-CM

## 2022-02-17 DIAGNOSIS — E66.01 MORBID OBESITY WITH BMI OF 40.0-44.9, ADULT (HCC): ICD-10-CM

## 2022-02-17 DIAGNOSIS — N64.89 BILATERAL PENDULOUS BREASTS: ICD-10-CM

## 2022-02-17 DIAGNOSIS — G89.29 CHRONIC PAIN OF BOTH KNEES: ICD-10-CM

## 2022-02-17 DIAGNOSIS — R06.02 SOBOE (SHORTNESS OF BREATH ON EXERTION): ICD-10-CM

## 2022-02-17 DIAGNOSIS — L65.9 HAIR LOSS: ICD-10-CM

## 2022-02-17 DIAGNOSIS — E78.2 MIXED HYPERLIPIDEMIA: ICD-10-CM

## 2022-02-17 DIAGNOSIS — M25.562 CHRONIC PAIN OF BOTH KNEES: ICD-10-CM

## 2022-02-17 DIAGNOSIS — Z12.31 ENCOUNTER FOR SCREENING MAMMOGRAM FOR MALIGNANT NEOPLASM OF BREAST: ICD-10-CM

## 2022-02-17 DIAGNOSIS — G89.29 CHRONIC LEFT SHOULDER PAIN: Primary | ICD-10-CM

## 2022-02-17 DIAGNOSIS — F17.210 SMOKING GREATER THAN 30 PACK YEARS: ICD-10-CM

## 2022-02-17 DIAGNOSIS — M25.512 CHRONIC LEFT SHOULDER PAIN: Primary | ICD-10-CM

## 2022-02-17 DIAGNOSIS — M25.561 CHRONIC PAIN OF BOTH KNEES: ICD-10-CM

## 2022-02-17 DIAGNOSIS — F31.78 BIPOLAR DISORDER, IN FULL REMISSION, MOST RECENT EPISODE MIXED (HCC): ICD-10-CM

## 2022-02-17 LAB
ALBUMIN SERPL-MCNC: 4.2 G/DL (ref 3.5–5.2)
ALP BLD-CCNC: 140 U/L (ref 35–104)
ALT SERPL-CCNC: 31 U/L (ref 0–32)
ANION GAP SERPL CALCULATED.3IONS-SCNC: 11 MMOL/L (ref 7–16)
AST SERPL-CCNC: 28 U/L (ref 0–31)
BASOPHILS ABSOLUTE: 0.02 E9/L (ref 0–0.2)
BASOPHILS RELATIVE PERCENT: 0.3 % (ref 0–2)
BILIRUB SERPL-MCNC: 0.3 MG/DL (ref 0–1.2)
BILIRUBIN DIRECT: <0.2 MG/DL (ref 0–0.3)
BILIRUBIN, INDIRECT: ABNORMAL MG/DL (ref 0–1)
BUN BLDV-MCNC: 12 MG/DL (ref 6–23)
CALCIUM SERPL-MCNC: 9.2 MG/DL (ref 8.6–10.2)
CHLORIDE BLD-SCNC: 98 MMOL/L (ref 98–107)
CHOLESTEROL, TOTAL: 172 MG/DL (ref 0–199)
CO2: 27 MMOL/L (ref 22–29)
CREAT SERPL-MCNC: 0.8 MG/DL (ref 0.5–1)
EOSINOPHILS ABSOLUTE: 0.22 E9/L (ref 0.05–0.5)
EOSINOPHILS RELATIVE PERCENT: 3 % (ref 0–6)
GFR AFRICAN AMERICAN: >60
GFR NON-AFRICAN AMERICAN: >60 ML/MIN/1.73
GLUCOSE BLD-MCNC: 94 MG/DL (ref 74–99)
HCT VFR BLD CALC: 38.5 % (ref 34–48)
HDLC SERPL-MCNC: 61 MG/DL
HEMOGLOBIN: 12.2 G/DL (ref 11.5–15.5)
IMMATURE GRANULOCYTES #: 0.04 E9/L
IMMATURE GRANULOCYTES %: 0.5 % (ref 0–5)
LDL CHOLESTEROL CALCULATED: 78 MG/DL (ref 0–99)
LYMPHOCYTES ABSOLUTE: 2.12 E9/L (ref 1.5–4)
LYMPHOCYTES RELATIVE PERCENT: 29 % (ref 20–42)
MCH RBC QN AUTO: 31.3 PG (ref 26–35)
MCHC RBC AUTO-ENTMCNC: 31.7 % (ref 32–34.5)
MCV RBC AUTO: 98.7 FL (ref 80–99.9)
MONOCYTES ABSOLUTE: 0.48 E9/L (ref 0.1–0.95)
MONOCYTES RELATIVE PERCENT: 6.6 % (ref 2–12)
NEUTROPHILS ABSOLUTE: 4.44 E9/L (ref 1.8–7.3)
NEUTROPHILS RELATIVE PERCENT: 60.6 % (ref 43–80)
PDW BLD-RTO: 15.4 FL (ref 11.5–15)
PLATELET # BLD: 328 E9/L (ref 130–450)
PMV BLD AUTO: 10.5 FL (ref 7–12)
POTASSIUM SERPL-SCNC: 4.5 MMOL/L (ref 3.5–5)
RBC # BLD: 3.9 E12/L (ref 3.5–5.5)
SODIUM BLD-SCNC: 136 MMOL/L (ref 132–146)
T4 FREE: 0.66 NG/DL (ref 0.93–1.7)
TOTAL PROTEIN: 7 G/DL (ref 6.4–8.3)
TRIGL SERPL-MCNC: 164 MG/DL (ref 0–149)
TSH SERPL DL<=0.05 MIU/L-ACNC: 2.08 UIU/ML (ref 0.27–4.2)
VLDLC SERPL CALC-MCNC: 33 MG/DL
WBC # BLD: 7.3 E9/L (ref 4.5–11.5)

## 2022-02-17 PROCEDURE — G8427 DOCREV CUR MEDS BY ELIG CLIN: HCPCS | Performed by: FAMILY MEDICINE

## 2022-02-17 PROCEDURE — G8417 CALC BMI ABV UP PARAM F/U: HCPCS | Performed by: FAMILY MEDICINE

## 2022-02-17 PROCEDURE — 99214 OFFICE O/P EST MOD 30 MIN: CPT | Performed by: FAMILY MEDICINE

## 2022-02-17 PROCEDURE — G8400 PT W/DXA NO RESULTS DOC: HCPCS | Performed by: FAMILY MEDICINE

## 2022-02-17 PROCEDURE — 4040F PNEUMOC VAC/ADMIN/RCVD: CPT | Performed by: FAMILY MEDICINE

## 2022-02-17 PROCEDURE — G8484 FLU IMMUNIZE NO ADMIN: HCPCS | Performed by: FAMILY MEDICINE

## 2022-02-17 PROCEDURE — 3017F COLORECTAL CA SCREEN DOC REV: CPT | Performed by: FAMILY MEDICINE

## 2022-02-17 PROCEDURE — 1123F ACP DISCUSS/DSCN MKR DOCD: CPT | Performed by: FAMILY MEDICINE

## 2022-02-17 PROCEDURE — 1090F PRES/ABSN URINE INCON ASSESS: CPT | Performed by: FAMILY MEDICINE

## 2022-02-17 PROCEDURE — 1036F TOBACCO NON-USER: CPT | Performed by: FAMILY MEDICINE

## 2022-02-17 RX ORDER — ATORVASTATIN CALCIUM 40 MG/1
TABLET, FILM COATED ORAL
Qty: 90 TABLET | Refills: 1 | Status: SHIPPED
Start: 2022-02-17 | End: 2022-08-12

## 2022-02-17 RX ORDER — LORAZEPAM 0.5 MG/1
TABLET ORAL
COMMUNITY
Start: 2022-02-04

## 2022-02-17 RX ORDER — MELATONIN
Qty: 30 TABLET | Refills: 3 | Status: SHIPPED
Start: 2022-02-17 | End: 2022-06-13

## 2022-02-17 RX ORDER — TRIAMCINOLONE ACETONIDE 1 MG/G
CREAM TOPICAL
Qty: 80 G | Refills: 1 | Status: SHIPPED
Start: 2022-02-17 | End: 2022-03-14

## 2022-02-17 NOTE — PROGRESS NOTES
22  Elena Sales : 1955 Sex: female  Age: 77 y.o. Assessment and Plan:  Tl Garcia was seen today for follow-up, thyroid problem, surgery and discuss medications. Diagnoses and all orders for this visit:    Chronic left shoulder pain  -     XR SHOULDER LEFT (MIN 2 VIEWS); Future  -     External Referral To Physical Therapy    Mixed hyperlipidemia  -     atorvastatin (LIPITOR) 40 MG tablet; One tab daily  -     Basic Metabolic Panel; Future  -     CBC with Auto Differential; Future  -     Lipid Panel; Future  -     Hepatic Function Panel; Future    Chronic pain of both knees  -     External Referral To Physical Therapy    Encounter for screening mammogram for malignant neoplasm of breast  -     PARESH DIGITAL SCREEN BILATERAL PER PROTOCOL; Future    Hair loss  -     TSH; Future  -     T4, Free; Future    Morbid obesity with BMI of 40.0-44.9, adult ([facility]  -     Basic Metabolic Panel; Future  -     CBC with Auto Differential; Future  -     Lipid Panel; Future  -     Hepatic Function Panel; Future    Bipolar disorder, in full remission, most recent episode mixed ([facility]    Smoking greater than 30 pack years  -     CT Lung Screen (Initial/Annual); Future  -     Full PFT Study With Bronchodilator; Future    Mild intermittent asthma without complication  -     Full PFT Study With Bronchodilator; Future    SOBOE (shortness of breath on exertion)  -     Full PFT Study With Bronchodilator; Future    Bilateral pendulous breasts  -     [facility]    Other orders  -     vitamin D3 (CHOLECALCIFEROL) 25 MCG (1000 UT) TABS tablet; TAKE ONE TABLET BY MOUTH EVERY DAY  -     triamcinolone (KENALOG) 0.1 % cream; Apply twice daily.       Check xray left shoulder  Refer back to PT   Breast reduction referral placed   Update mammo  Update BW   Check PFTs   Cont albuterol for now  Cont other current meds, refills as requested   Further recommendations pending results  CT lung cancer screening - risks/benefits clearly reviewed and pt opts to proceed       Return in about 6 weeks (around 3/31/2022). Chief Complaint   Patient presents with    Follow-up     Needs breathing checked, didnt do mammo, dermatologist,  for breast reduction.  Thyroid Problem     Wants to have it checked    Surgery     Larger right toe     Discuss Medications     Lipitor and vitamin d3       HPI  Pt here for routine f/u   She is overall doing well but has some concerns    She is interested in breast reduction  She is cup I   Patient states she has wanted to do this for a long time, but has been putting it off  She believes that it will help with some of her chronic back pain issues  Did not do previously ordered mammogram     Left shoulder still bothering her despite PT   It has been somewhat helpful for her knees as well and she would like to continue that now that it is a new year  She would also like to get x-ray of the left shoulder for further evaluation  Again, the patient did not do the previously ordered 1    Bipolar - overall stable, still follows with Dr. Neo Daniel - she is on atorvastatin    Asthma - she never did the previously ordered PFTs but would like to  She does not feel that she needs her inhaler frequently, but does note shortness of breath with even mild exertion  Also reports h/o smoking, 15 years at 2+ ppd   Just quit this past new years   She would like to do lung cancer screening     Problem list reviewed and updated in full with patient today as necessary. A comprehensive ROS was negative, except as documented above.    +hair loss - worried about her thyroid       Current Outpatient Medications:     LORazepam (ATIVAN) 0.5 MG tablet, TAKE ONE TABLET BY MOUTH THREE TIMES DAILY, Disp: , Rfl:     atorvastatin (LIPITOR) 40 MG tablet, One tab daily, Disp: 90 tablet, Rfl: 1    vitamin D3 (CHOLECALCIFEROL) 25 MCG (1000 UT) TABS tablet, TAKE ONE TABLET BY MOUTH EVERY DAY, Disp: 30 tablet, Rfl: 3    triamcinolone (KENALOG) 0.1 % cream, Apply twice daily. , Disp: 80 g, Rfl: 1    albuterol sulfate  (90 Base) MCG/ACT inhaler, INHALE ONE PUFF EVERY 4 HOURS AS NEEDED, Disp: 8.5 g, Rfl: 1    ARIPiprazole (ABILIFY) 2 MG tablet, TAKE ONE TABLET BY MOUTH EVERY MORNING, Disp: , Rfl:     Multiple Vitamins-Minerals (MULTIVITAMIN WOMEN 50+ PO), Take by mouth, Disp: , Rfl:     magnesium (MAGNESIUM-OXIDE) 250 MG TABS tablet, Take 250 mg by mouth daily, Disp: , Rfl:     zinc 50 MG CAPS, Take by mouth, Disp: , Rfl:     ferrous sulfate (IRON 325) 325 (65 Fe) MG tablet, Take 325 mg by mouth daily (with breakfast), Disp: , Rfl:     Multiple Vitamins-Minerals (HAIR SKIN AND NAILS FORMULA) TABS, Take by mouth, Disp: , Rfl:     Calcium Carb-Magnesium Carb (MAGNEBIND 300) 250-300 MG TABS, Take by mouth, Disp: , Rfl:     vitamin C (ASCORBIC ACID) 500 MG tablet, Take 500 mg by mouth daily, Disp: , Rfl:     Omega 3 340 MG CPDR, Take 340 mg by mouth, Disp: , Rfl:     GARCINIA CAMBOGIA-CHROMIUM PO, , Disp: , Rfl:     buPROPion (WELLBUTRIN XL) 150 MG extended release tablet, TAKE ONE TABLET BY MOUTH EVERY MORNING, Disp: , Rfl: 1    QUEtiapine (SEROQUEL) 400 MG tablet, TAKE TWO TABLETS BY MOUTH AT BEDTIME, Disp: , Rfl: 5    acetaminophen (TYLENOL) 500 MG tablet, Take 500 mg by mouth every 6 hours as needed for Pain Indications: Four 500mg QD, Disp: , Rfl:     lamoTRIgine (LAMICTAL) 200 MG tablet, Take 200 mg by mouth daily , Disp: , Rfl:     desvenlafaxine succinate (PRISTIQ) 50 MG TB24 extended release tablet, Take 50 mg by mouth daily, Disp: , Rfl:     desvenlafaxine succinate (PRISTIQ) 100 MG TB24 extended release tablet, TAKE ONE TABLET BY MOUTH EVERY DAY, Disp: , Rfl:     vitamin B-12 (CYANOCOBALAMIN) 1000 MCG tablet, TAKE ONE TABLET BY MOUTH EVERY DAY (Patient taking differently: Take 5,000 mcg by mouth daily ), Disp: 30 tablet, Rfl: 3    albuterol sulfate HFA (VENTOLIN HFA) 108 (90 Base) MCG/ACT inhaler, Inhale into the lungs (Patient not taking: Reported on 2/17/2022), Disp: , Rfl:     hydrocortisone 2.5 % cream, Apply  topically as needed. Apply topically 2 times daily. (Patient not taking: Reported on 2/17/2022), Disp: , Rfl:   Allergies   Allergen Reactions    Latex      With condom     Amlodipine Besylate     Diphenhydramine      Does the opposite    wired    Hydroxyzine     Sulfa Antibiotics      As a child    Vistaril [Hydroxyzine Hcl]      \"does the complete opposite\"       Pt's past medical and surgical history were reviewed and updated as necessary today   Pt's family and social history were reviewed and updated as necessary today      Vitals:    02/17/22 0916   BP: 112/78   Pulse: 78   Resp: 20   Temp: 98.2 °F (36.8 °C)   TempSrc: Temporal   SpO2: 98%   Weight: 250 lb (113.4 kg)   Height: 5' 5\" (1.651 m)       Physical Exam  Constitutional:       Appearance: Normal appearance. She is obese. HENT:      Head: Normocephalic and atraumatic. Eyes:      Conjunctiva/sclera: Conjunctivae normal.   Cardiovascular:      Rate and Rhythm: Normal rate and regular rhythm. Heart sounds: Normal heart sounds. Pulmonary:      Effort: Pulmonary effort is normal.      Breath sounds: Normal breath sounds. Abdominal:      Palpations: Abdomen is soft. Tenderness: There is no abdominal tenderness. Musculoskeletal:      Comments: Patient is in a wheelchair today   Skin:     General: Skin is warm and dry. Neurological:      General: No focal deficit present. Mental Status: She is alert and oriented to person, place, and time. Psychiatric:         Mood and Affect: Mood normal.         Behavior: Behavior normal.     Counseled patient as appropriate and relevant regarding above diagnosis, including possible risks and complications, especially if left uncontrolled.   Counseled patient as appropriate and relevant regarding any  possible side effects, risks, and alternatives to treatment; patient and/or guardian verbalizes understanding, and is in agreement with the plan as detailed above. Reviewed age and gender appropriate health screening exams and vaccinations. Advised patient regarding importance of keeping up with recommended health maintenance and to schedule as soon as possible if overdue, as this is important in assessing for undiagnosed pathology, especially cancer, as well as protecting against potentially harmful/life threatening disease. If discussed, any educational materials and/or home exercises printed for patient's review and were included in patient instructions on his/her After Visit Summary and given to patient at the end of visit. Advised patient to call with any new medication issues, and and other concerns/complaints prior to scheduled follow up. All questions answered to the patient's satisfaction.         Seen By:  Ivon Clay MD

## 2022-03-09 ENCOUNTER — TELEPHONE (OUTPATIENT)
Dept: PRIMARY CARE CLINIC | Age: 67
End: 2022-03-09

## 2022-03-09 NOTE — TELEPHONE ENCOUNTER
[facility] was trying to schedule patient for CT Lung Screening and patient states that she had had back surgery and cannot lay on her back. She told them that she would need a strong medication in order for her to be able to do this. The lady at scheduling said she closed the order for now so that the patient was not being called every day, but it can be resent when she is able to be scheduled.

## 2022-03-09 NOTE — TELEPHONE ENCOUNTER
The scan takes about 15 min  Does she need a pain med or anxiety med?    I can send and she can take at hospital before the scan  Will need a

## 2022-03-10 NOTE — TELEPHONE ENCOUNTER
I tried to reach this pt through both of her main phone numbers the other day, and both were not working. Any ideas on which number I would call?

## 2022-03-14 ENCOUNTER — TELEPHONE (OUTPATIENT)
Dept: PRIMARY CARE CLINIC | Age: 67
End: 2022-03-14

## 2022-03-14 RX ORDER — TRIAMCINOLONE ACETONIDE 1 MG/G
CREAM TOPICAL
Qty: 80 G | Refills: 1 | Status: SHIPPED
Start: 2022-03-14 | End: 2022-03-29

## 2022-03-14 NOTE — TELEPHONE ENCOUNTER
Last Appointment:  2/17/2022  Future Appointments   Date Time Provider Sushma Tyleristi   3/21/2022 10:30 AM [facility]   3/31/2022 12:00 PM Estrella Carr MD [facility]      Patient called to request we call to cancel her breathing test.  She wants to speak with you about it.     Electronically signed by Katelin Manrique LPN on 3/84/0226 at 9:57 PM

## 2022-03-15 DIAGNOSIS — M25.512 CHRONIC LEFT SHOULDER PAIN: Primary | ICD-10-CM

## 2022-03-15 DIAGNOSIS — G89.29 CHRONIC LEFT SHOULDER PAIN: Primary | ICD-10-CM

## 2022-03-15 NOTE — TELEPHONE ENCOUNTER
Pt states she just needs a heavy pain medication for that day. She would like to know which medication you will send in.

## 2022-03-15 NOTE — TELEPHONE ENCOUNTER
I can send in vicodin or percocet if she thinks that can get her through the scan  It's a screening test so she doesn't have to do the test if she'd rather not, though in general I do recommend it

## 2022-03-21 ENCOUNTER — TELEPHONE (OUTPATIENT)
Dept: PRIMARY CARE CLINIC | Age: 67
End: 2022-03-21

## 2022-03-21 DIAGNOSIS — G89.4 CHRONIC PAIN SYNDROME: Primary | ICD-10-CM

## 2022-03-21 RX ORDER — OXYCODONE HYDROCHLORIDE AND ACETAMINOPHEN 5; 325 MG/1; MG/1
1 TABLET ORAL SEE ADMIN INSTRUCTIONS
Qty: 2 TABLET | Refills: 0 | Status: SHIPPED | OUTPATIENT
Start: 2022-03-21 | End: 2022-03-22

## 2022-03-21 NOTE — TELEPHONE ENCOUNTER
Patient called back and stated she wants to have the test done. She would like to know what pain medication is stronger before she decides which one is called in for her?       Please advise

## 2022-03-21 NOTE — TELEPHONE ENCOUNTER
Patient called back and stated that percocet is the stronger medication and she would like that called in for her.

## 2022-03-29 RX ORDER — TRIAMCINOLONE ACETONIDE 1 MG/G
CREAM TOPICAL
Qty: 80 G | Refills: 1 | Status: SHIPPED
Start: 2022-03-29 | End: 2022-04-29

## 2022-03-31 ENCOUNTER — OFFICE VISIT (OUTPATIENT)
Dept: PRIMARY CARE CLINIC | Age: 67
End: 2022-03-31
Payer: COMMERCIAL

## 2022-03-31 VITALS
RESPIRATION RATE: 18 BRPM | DIASTOLIC BLOOD PRESSURE: 60 MMHG | SYSTOLIC BLOOD PRESSURE: 112 MMHG | TEMPERATURE: 97.3 F | OXYGEN SATURATION: 96 % | HEIGHT: 65 IN | HEART RATE: 85 BPM | BODY MASS INDEX: 41.6 KG/M2

## 2022-03-31 DIAGNOSIS — G89.29 CHRONIC LEFT SHOULDER PAIN: Primary | ICD-10-CM

## 2022-03-31 DIAGNOSIS — R94.6 ABNORMAL THYROID FUNCTION TEST: ICD-10-CM

## 2022-03-31 DIAGNOSIS — M25.512 CHRONIC LEFT SHOULDER PAIN: Primary | ICD-10-CM

## 2022-03-31 LAB
T4 FREE: 0.77 NG/DL (ref 0.93–1.7)
TSH SERPL DL<=0.05 MIU/L-ACNC: 1.97 UIU/ML (ref 0.27–4.2)

## 2022-03-31 PROCEDURE — G8400 PT W/DXA NO RESULTS DOC: HCPCS | Performed by: FAMILY MEDICINE

## 2022-03-31 PROCEDURE — G8427 DOCREV CUR MEDS BY ELIG CLIN: HCPCS | Performed by: FAMILY MEDICINE

## 2022-03-31 PROCEDURE — 3017F COLORECTAL CA SCREEN DOC REV: CPT | Performed by: FAMILY MEDICINE

## 2022-03-31 PROCEDURE — 99214 OFFICE O/P EST MOD 30 MIN: CPT | Performed by: FAMILY MEDICINE

## 2022-03-31 PROCEDURE — 1090F PRES/ABSN URINE INCON ASSESS: CPT | Performed by: FAMILY MEDICINE

## 2022-03-31 PROCEDURE — 1036F TOBACCO NON-USER: CPT | Performed by: FAMILY MEDICINE

## 2022-03-31 PROCEDURE — G8417 CALC BMI ABV UP PARAM F/U: HCPCS | Performed by: FAMILY MEDICINE

## 2022-03-31 PROCEDURE — 4040F PNEUMOC VAC/ADMIN/RCVD: CPT | Performed by: FAMILY MEDICINE

## 2022-03-31 PROCEDURE — G8484 FLU IMMUNIZE NO ADMIN: HCPCS | Performed by: FAMILY MEDICINE

## 2022-03-31 PROCEDURE — 1123F ACP DISCUSS/DSCN MKR DOCD: CPT | Performed by: FAMILY MEDICINE

## 2022-03-31 SDOH — ECONOMIC STABILITY: FOOD INSECURITY: WITHIN THE PAST 12 MONTHS, THE FOOD YOU BOUGHT JUST DIDN'T LAST AND YOU DIDN'T HAVE MONEY TO GET MORE.: NEVER TRUE

## 2022-03-31 SDOH — ECONOMIC STABILITY: FOOD INSECURITY: WITHIN THE PAST 12 MONTHS, YOU WORRIED THAT YOUR FOOD WOULD RUN OUT BEFORE YOU GOT MONEY TO BUY MORE.: NEVER TRUE

## 2022-03-31 ASSESSMENT — PATIENT HEALTH QUESTIONNAIRE - PHQ9
3. TROUBLE FALLING OR STAYING ASLEEP: 1
9. THOUGHTS THAT YOU WOULD BE BETTER OFF DEAD, OR OF HURTING YOURSELF: 0
SUM OF ALL RESPONSES TO PHQ QUESTIONS 1-9: 2
10. IF YOU CHECKED OFF ANY PROBLEMS, HOW DIFFICULT HAVE THESE PROBLEMS MADE IT FOR YOU TO DO YOUR WORK, TAKE CARE OF THINGS AT HOME, OR GET ALONG WITH OTHER PEOPLE: 0
2. FEELING DOWN, DEPRESSED OR HOPELESS: 0
4. FEELING TIRED OR HAVING LITTLE ENERGY: 0
6. FEELING BAD ABOUT YOURSELF - OR THAT YOU ARE A FAILURE OR HAVE LET YOURSELF OR YOUR FAMILY DOWN: 0
SUM OF ALL RESPONSES TO PHQ QUESTIONS 1-9: 2
1. LITTLE INTEREST OR PLEASURE IN DOING THINGS: 0
SUM OF ALL RESPONSES TO PHQ QUESTIONS 1-9: 2
5. POOR APPETITE OR OVEREATING: 0
8. MOVING OR SPEAKING SO SLOWLY THAT OTHER PEOPLE COULD HAVE NOTICED. OR THE OPPOSITE, BEING SO FIGETY OR RESTLESS THAT YOU HAVE BEEN MOVING AROUND A LOT MORE THAN USUAL: 0
SUM OF ALL RESPONSES TO PHQ QUESTIONS 1-9: 2
7. TROUBLE CONCENTRATING ON THINGS, SUCH AS READING THE NEWSPAPER OR WATCHING TELEVISION: 1
SUM OF ALL RESPONSES TO PHQ9 QUESTIONS 1 & 2: 0

## 2022-03-31 ASSESSMENT — ENCOUNTER SYMPTOMS: GASTROINTESTINAL NEGATIVE: 1

## 2022-03-31 ASSESSMENT — SOCIAL DETERMINANTS OF HEALTH (SDOH): HOW HARD IS IT FOR YOU TO PAY FOR THE VERY BASICS LIKE FOOD, HOUSING, MEDICAL CARE, AND HEATING?: NOT VERY HARD

## 2022-03-31 NOTE — PATIENT INSTRUCTIONS
Learning About the [facility] Diet  What is the Mediterranean diet? The Mediterranean diet is a style of eating rather than a diet plan. It features foods eaten in [place], [place], [place], and other countries along the [facility]. It emphasizes eating foods like fish, fruits, vegetables, beans, high-fiber breads and whole grains, nuts, and olive oil. This style of eating includes limited red meat, cheese, and sweets. Why choose the Mediterranean diet? A Mediterranean-style diet may improve heart health. It contains more fat than other heart-healthy diets. But the fats are mainly from nuts, unsaturated oils (such as fish oils and olive oil), and certain nut or seed oils (such as canola, soybean, or flaxseed oil). These fats may help protect the heart and blood vessels. How can you get started on the Mediterranean diet? Here are some things you can do to switch to a more Mediterranean way of eating. What to eat  · Eat a variety of fruits and vegetables each day, such as grapes, blueberries, tomatoes, broccoli, peppers, figs, olives, spinach, eggplant, beans, lentils, and chickpeas. · Eat a variety of whole-grain foods each day, such as oats, brown rice, and whole wheat bread, pasta, and couscous. · Eat fish at least 2 times a week. Try tuna, salmon, mackerel, lake trout, herring, or sardines. · Eat moderate amounts of low-fat dairy products, such as milk, cheese, or yogurt. · Eat moderate amounts of poultry and eggs. · Choose healthy (unsaturated) fats, such as nuts, olive oil, and certain nut or seed oils like canola, soybean, and flaxseed. · Limit unhealthy (saturated) fats, such as butter, palm oil, and coconut oil. And limit fats found in animal products, such as meat and dairy products made with whole milk. Try to eat red meat only a few times a month in very small amounts. · Limit sweets and desserts to only a few times a week.  This includes sugar-sweetened drinks like soda. The Mediterranean diet may also include red wine with your meal--1 glass each day for women and up to 2 glasses a day for men. Tips for eating at home  · Use herbs, spices, garlic, lemon zest, and citrus juice instead of salt to add flavor to foods. · Add avocado slices to your sandwich instead of mccauley. · Have fish for lunch or dinner instead of red meat. Brush the fish with olive oil, and broil or grill it. · Sprinkle your salad with seeds or nuts instead of cheese. · Cook with olive or canola oil instead of butter or oils that are high in saturated fat. · Switch from 2% milk or whole milk to 1% or fat-free milk. · Dip raw vegetables in a vinaigrette dressing or hummus instead of dips made from mayonnaise or sour cream.  · Have a piece of fruit for dessert instead of a piece of cake. Try baked apples, or have some dried fruit. Tips for eating out  · Try broiled, grilled, baked, or poached fish instead of having it fried or breaded. · Ask your  to have your meals prepared with olive oil instead of butter. · Order dishes made with marinara sauce or sauces made from olive oil. Avoid sauces made from cream or mayonnaise. · Choose whole-grain breads, whole wheat pasta and pizza crust, brown rice, beans, and lentils. · Cut back on butter or margarine on bread. Instead, you can dip your bread in a small amount of olive oil. · Ask for a side salad or grilled vegetables instead of french fries or chips. Where can you learn more? Go to [email or web address] and sign in to your Mobile Authentication account. Enter [phone] in the [facility] box to learn more about \"Learning About the Mediterranean Diet. \"     If you do not have an account, please click on the \"Sign Up Now\" link. Current as of: August 22, 2019               Content Version: 12.6  © 8282-3676 [employer]. Care instructions adapted under license by [facility] [place] ([facility]).  If you have questions about a medical condition or this instruction, always ask your healthcare professional. [place] [address] any warranty or liability for your use of this information.

## 2022-03-31 NOTE — PROGRESS NOTES
3/31/22  Elena Sales : 1955 Sex: female  Age: 79 y.o. Assessment and Plan:  Madelaine Bui was seen today for follow-up. Diagnoses and all orders for this visit:    Chronic left shoulder pain  -     Lindsay Huntley MD, Orthopaedics (hand & upper extremities), [place]    Abnormal thyroid function test  -     T4, Free; Future  -     TSH; Future    Refer to Dr. Remy Gray for her shoulder  Recheck thyroid   Further recommendations pending results  Agree with plans for second opinion on foot  Pt also asked about Mediterranean diet - information given    Return in about 3 months (around 2022).         Chief Complaint   Patient presents with    Follow-up       HPI  Pt here for   She is overall feeling ok but has a few things to go over   Has had a couple of panic attacks recently but thinks her Ativan pill bottle is under her bed     Pt saw podiatry about her right foot  They are interested in doing an extensive procedure  Pt is going to have second opinion - knows who she wants to see and has had her records sent already, just needs to schedule    Still having issues with her shoulder   Dr. Christa Carlson doesn't do shoulders  Will refer to Dr. Remy Gray    She is scheduled for her CT lung now  Has pain meds for use during testing given her chronic back pain issues       Labs reviewed in full with patient:   Component      Latest Ref Rng & Units 2022           9:59 AM  9:59 AM  9:59 AM  9:59 AM   WBC      4.5 - 11.5 E9/L       RBC      3.50 - 5.50 E12/L       Hemoglobin Quant      11.5 - 15.5 g/dL       Hematocrit      34.0 - 48.0 %       MCV      80.0 - 99.9 fL       MCH      26.0 - 35.0 pg       MCHC      32.0 - 34.5 %       RDW      11.5 - 15.0 fL       Platelet Count      910 - 450 E9/L       MPV      7.0 - 12.0 fL       Neutrophils %      43.0 - 80.0 %       Immature Granulocytes %      0.0 - 5.0 %       Lymphocyte %      20.0 - 42.0 %       Monocytes %      2.0 - 12.0 %       Eosinophils %      0.0 - 6.0 %       Basophils %      0.0 - 2.0 %       Neutrophils Absolute      1.80 - 7.30 E9/L       Immature Granulocytes #      E9/L       Lymphocytes Absolute      1.50 - 4.00 E9/L       Monocytes Absolute      0.10 - 0.95 E9/L       Eosinophils Absolute      0.05 - 0.50 E9/L       Basophils Absolute      0.00 - 0.20 E9/L       Sodium      132 - 146 mmol/L   136    Potassium      3.5 - 5.0 mmol/L   4.5    Chloride      98 - 107 mmol/L   98    CO2      22 - 29 mmol/L   27    Anion Gap      7 - 16 mmol/L   11    GLUCOSE, FASTING,GF      74 - 99 mg/dL   94    BUN,BUNPL      6 - 23 mg/dL   12    Creatinine      0.5 - 1.0 mg/dL   0.8    GFR Non-African American      >=60 mL/min/1.73   >60    GFR          >60    CALCIUM, SERUM, 765993      8.6 - 10.2 mg/dL   9.2    Total Protein      6.4 - 8.3 g/dL 7.0      Albumin      3.5 - 5.2 g/dL 4.2      Alk Phos      35 - 104 U/L 140 (H)      ALT      0 - 32 U/L 31      AST      0 - 31 U/L 28      Bilirubin      0.0 - 1.2 mg/dL 0.3      Bilirubin, Direct      0.0 - 0.3 mg/dL <0.2      Bilirubin, Indirect      0.0 - 1.0 mg/dL see below      CHOLESTEROL, TOTAL, [id number]      0 - 199 mg/dL  172     Triglycerides      0 - 149 mg/dL  164 (H)     HDL Cholesterol      >40 mg/dL  61     LDL Calculated      0 - 99 mg/dL  78     VLDL Cholesterol Calculated      mg/dL  33     TSH      0.270 - 4.200 uIU/mL       T4 Free      0.93 - 1.70 ng/dL    0.66 (L)     Component      Latest Ref Rng & Units 2/17/2022 2/17/2022           9:59 AM  9:59 AM   WBC      4.5 - 11.5 E9/L  7.3   RBC      3.50 - 5.50 E12/L  3.90   Hemoglobin Quant      11.5 - 15.5 g/dL  12.2   Hematocrit      34.0 - 48.0 %  38.5   MCV      80.0 - 99.9 fL  98.7   MCH      26.0 - 35.0 pg  31.3   MCHC      32.0 - 34.5 %  31.7 (L)   RDW      11.5 - 15.0 fL  15.4 (H)   Platelet Count      751 - 450 E9/L  328   MPV      7.0 - 12.0 fL  10.5   Neutrophils %      43.0 - 80.0 %  60.6 Immature Granulocytes %      0.0 - 5.0 %  0.5   Lymphocyte %      20.0 - 42.0 %  29.0   Monocytes %      2.0 - 12.0 %  6.6   Eosinophils %      0.0 - 6.0 %  3.0   Basophils %      0.0 - 2.0 %  0.3   Neutrophils Absolute      1.80 - 7.30 E9/L  4.44   Immature Granulocytes #      E9/L  0.04   Lymphocytes Absolute      1.50 - 4.00 E9/L  2.12   Monocytes Absolute      0.10 - 0.95 E9/L  0.48   Eosinophils Absolute      0.05 - 0.50 E9/L  0.22   Basophils Absolute      0.00 - 0.20 E9/L  0.02   Sodium      132 - 146 mmol/L     Potassium      3.5 - 5.0 mmol/L     Chloride      98 - 107 mmol/L     CO2      22 - 29 mmol/L     Anion Gap      7 - 16 mmol/L     GLUCOSE, FASTING,GF      74 - 99 mg/dL     BUN,BUNPL      6 - 23 mg/dL     Creatinine      0.5 - 1.0 mg/dL     GFR Non-African American      >=60 mL/min/1.73     GFR African American           CALCIUM, SERUM, [id number]      8.6 - 10.2 mg/dL     Total Protein      6.4 - 8.3 g/dL     Albumin      3.5 - 5.2 g/dL     Alk Phos      35 - 104 U/L     ALT      0 - 32 U/L     AST      0 - 31 U/L     Bilirubin      0.0 - 1.2 mg/dL     Bilirubin, Direct      0.0 - 0.3 mg/dL     Bilirubin, Indirect      0.0 - 1.0 mg/dL     CHOLESTEROL, TOTAL, [id number]      0 - 199 mg/dL     Triglycerides      0 - 149 mg/dL     HDL Cholesterol      >40 mg/dL     LDL Calculated      0 - 99 mg/dL     VLDL Cholesterol Calculated      mg/dL     TSH      0.270 - 4.200 uIU/mL 2.080    T4 Free      0.93 - 1.70 ng/dL       Problem list reviewed and updated in full with patient today as necessary. A comprehensive ROS was negative, except as documented above. Review of Systems   Respiratory:        Generally stable - hasn't really needed her inhaler outside of before PT visits   Cardiovascular: Positive for leg swelling. Negative for chest pain and palpitations. Very mild around her ankles, just noted with the weather change   Gastrointestinal: Negative. Genitourinary: Negative.     Neurological: Lightheadedness that passed quickly the other day. No associated red flags.    Psychiatric/Behavioral:        A bit stressed but managing        Current Outpatient Medications:     triamcinolone (KENALOG) 0.1 % cream, APPLY TO THE AFFECTED AREA(S) TWICE DAILY, Disp: 80 g, Rfl: 1    LORazepam (ATIVAN) 0.5 MG tablet, TAKE ONE TABLET BY MOUTH THREE TIMES DAILY, Disp: , Rfl:     atorvastatin (LIPITOR) 40 MG tablet, One tab daily, Disp: 90 tablet, Rfl: 1    vitamin D3 (CHOLECALCIFEROL) 25 MCG (1000 UT) TABS tablet, TAKE ONE TABLET BY MOUTH EVERY DAY, Disp: 30 tablet, Rfl: 3    albuterol sulfate  (90 Base) MCG/ACT inhaler, INHALE ONE PUFF EVERY 4 HOURS AS NEEDED, Disp: 8.5 g, Rfl: 1    ARIPiprazole (ABILIFY) 2 MG tablet, TAKE ONE TABLET BY MOUTH EVERY MORNING, Disp: , Rfl:     desvenlafaxine succinate (PRISTIQ) 50 MG TB24 extended release tablet, Take 50 mg by mouth daily, Disp: , Rfl:     Multiple Vitamins-Minerals (MULTIVITAMIN WOMEN 50+ PO), Take by mouth, Disp: , Rfl:     magnesium (MAGNESIUM-OXIDE) 250 MG TABS tablet, Take 250 mg by mouth daily, Disp: , Rfl:     zinc 50 MG CAPS, Take by mouth, Disp: , Rfl:     ferrous sulfate (IRON 325) 325 (65 Fe) MG tablet, Take 325 mg by mouth daily (with breakfast), Disp: , Rfl:     Multiple Vitamins-Minerals (HAIR SKIN AND NAILS FORMULA) TABS, Take by mouth, Disp: , Rfl:     desvenlafaxine succinate (PRISTIQ) 100 MG TB24 extended release tablet, TAKE ONE TABLET BY MOUTH EVERY DAY, Disp: , Rfl:     Calcium Carb-Magnesium Carb (MAGNEBIND 300) 250-300 MG TABS, Take by mouth, Disp: , Rfl:     vitamin C (ASCORBIC ACID) 500 MG tablet, Take 500 mg by mouth daily, Disp: , Rfl:     Omega 3 340 MG CPDR, Take 340 mg by mouth, Disp: , Rfl:     vitamin B-12 (CYANOCOBALAMIN) 1000 MCG tablet, TAKE ONE TABLET BY MOUTH EVERY DAY (Patient taking differently: Take 5,000 mcg by mouth daily ), Disp: 30 tablet, Rfl: 3    GARCINIA CAMBOGIA-CHROMIUM PO, , Disp: , Rfl:    buPROPion (WELLBUTRIN XL) 150 MG extended release tablet, TAKE ONE TABLET BY MOUTH EVERY MORNING, Disp: , Rfl: 1    albuterol sulfate HFA (VENTOLIN HFA) 108 (90 Base) MCG/ACT inhaler, Inhale into the lungs , Disp: , Rfl:     QUEtiapine (SEROQUEL) 400 MG tablet, TAKE TWO TABLETS BY MOUTH AT BEDTIME, Disp: , Rfl: 5    acetaminophen (TYLENOL) 500 MG tablet, Take 500 mg by mouth every 6 hours as needed for Pain Indications: Four 500mg QD, Disp: , Rfl:     lamoTRIgine (LAMICTAL) 200 MG tablet, Take 200 mg by mouth daily , Disp: , Rfl:     hydrocortisone 2.5 % cream, Apply topically as needed Apply topically 2 times daily. , Disp: , Rfl:     levothyroxine (SYNTHROID) 25 MCG tablet, Take 1/2 tab daily on an empty stomach. Do not have any food or drink for 30 minutes after., Disp: 15 tablet, Rfl: 5  Allergies   Allergen Reactions    Latex      With condom     Amlodipine Besylate     Diphenhydramine      Does the opposite    wired    Hydroxyzine     Sulfa Antibiotics      As a child    Vistaril [Hydroxyzine Hcl]      \"does the complete opposite\"       Pt's past medical and surgical history were reviewed and updated as necessary today   Pt's family and social history were reviewed and updated as necessary today      Vitals:    03/31/22 1143   BP: 112/60   Pulse: 85   Resp: 18   Temp: 97.3 °F (36.3 °C)   TempSrc: Temporal   SpO2: 96%   Weight: Comment: Pt refused   Height: 5' 5\" (1.651 m)       Physical Exam  Constitutional:       Appearance: Normal appearance. HENT:      Head: Normocephalic and atraumatic. Eyes:      Conjunctiva/sclera: Conjunctivae normal.   Cardiovascular:      Rate and Rhythm: Normal rate and regular rhythm. Heart sounds: Normal heart sounds. Pulmonary:      Effort: Pulmonary effort is normal.      Breath sounds: Normal breath sounds. Abdominal:      Palpations: Abdomen is soft. Tenderness: There is no abdominal tenderness.    Musculoskeletal:         General: Normal range of motion. Skin:     General: Skin is warm and dry. Neurological:      General: No focal deficit present. Mental Status: She is alert and oriented to person, place, and time. Psychiatric:         Mood and Affect: Mood normal.         Behavior: Behavior normal.        Counseled patient as appropriate and relevant regarding above diagnosis, including possible risks and complications, especially if left uncontrolled. Counseled patient as appropriate and relevant regarding any  possible side effects, risks, and alternatives to treatment; patient and/or guardian verbalizes understanding, and is in agreement with the plan as detailed above. Reviewed age and gender appropriate health screening exams and vaccinations. Advised patient regarding importance of keeping up with recommended health maintenance and to schedule as soon as possible if overdue, as this is important in assessing for undiagnosed pathology, especially cancer, as well as protecting against potentially harmful/life threatening disease. If discussed, any educational materials and/or home exercises printed for patient's review and were included in patient instructions on his/her After Visit Summary and given to patient at the end of visit. Advised patient to call with any new medication issues, and and other concerns/complaints prior to scheduled follow up. All questions answered to the patient's satisfaction.         Seen By:  Clarissa Chaparro MD

## 2022-04-01 RX ORDER — LEVOTHYROXINE SODIUM 0.03 MG/1
TABLET ORAL
Qty: 15 TABLET | Refills: 5 | Status: SHIPPED
Start: 2022-04-01 | End: 2022-09-06 | Stop reason: SDUPTHER

## 2022-04-11 ENCOUNTER — TELEPHONE (OUTPATIENT)
Dept: PRIMARY CARE CLINIC | Age: 67
End: 2022-04-11

## 2022-04-11 DIAGNOSIS — R60.0 LOCALIZED EDEMA: Primary | ICD-10-CM

## 2022-04-11 NOTE — TELEPHONE ENCOUNTER
Last Appointment:  3/31/2022  Future Appointments   Date Time Provider Sushma Russell   5/5/2022  2:30 PM Jacqueline Parmar MD [facility]   6/30/2022  9:30 AM Tahira Blakely MD [facility]      Patient called her legs and feet are swollen. She is asking for something to be call in to [facility].     Electronically signed by Rita Simms LPN on 2/62/7669 at 2:40 PM

## 2022-04-12 RX ORDER — FUROSEMIDE 20 MG/1
20 TABLET ORAL DAILY PRN
Qty: 30 TABLET | Refills: 0 | Status: SHIPPED
Start: 2022-04-12 | End: 2022-04-26 | Stop reason: SDUPTHER

## 2022-04-12 RX ORDER — POTASSIUM CHLORIDE 750 MG/1
10 TABLET, EXTENDED RELEASE ORAL DAILY PRN
Qty: 30 TABLET | Refills: 0 | Status: SHIPPED
Start: 2022-04-12 | End: 2022-08-22 | Stop reason: SDUPTHER

## 2022-04-12 NOTE — TELEPHONE ENCOUNTER
04/12/2022 left message for patient regarding new meds and advice to elevate legs, avoid salt, and if trouble breathing to go to ER.

## 2022-04-12 NOTE — TELEPHONE ENCOUNTER
Lasix and potassium sent  Should only take as needed, and always together      Elevate legs  Avoid salt  If any trouble breathing or other sx call  Let me know if not working

## 2022-04-25 NOTE — TELEPHONE ENCOUNTER
Pt calling to see if Rx for Lasix could be sent again it was delivered but she can't find it I tried to explain that she will need to speak with her insurance company about it being refilled so soon and pt hang up. Rx pended.

## 2022-04-26 RX ORDER — FUROSEMIDE 20 MG/1
20 TABLET ORAL DAILY PRN
Qty: 30 TABLET | Refills: 0 | Status: SHIPPED
Start: 2022-04-26 | End: 2022-08-22 | Stop reason: SDUPTHER

## 2022-04-29 RX ORDER — TRIAMCINOLONE ACETONIDE 1 MG/G
CREAM TOPICAL
Qty: 30 G | Refills: 0 | Status: SHIPPED | OUTPATIENT
Start: 2022-04-29

## 2022-05-10 RX ORDER — TRIAMCINOLONE ACETONIDE 1 MG/G
CREAM TOPICAL
Qty: 30 G | Refills: 0 | OUTPATIENT
Start: 2022-05-10

## 2022-05-17 ENCOUNTER — TELEPHONE (OUTPATIENT)
Dept: PRIMARY CARE CLINIC | Age: 67
End: 2022-05-17

## 2022-05-17 RX ORDER — TIZANIDINE 2 MG/1
2 TABLET ORAL 3 TIMES DAILY PRN
Qty: 30 TABLET | Refills: 0 | Status: SHIPPED
Start: 2022-05-17 | End: 2022-08-22 | Stop reason: ALTCHOICE

## 2022-05-17 NOTE — TELEPHONE ENCOUNTER
Pt states she has been having muscle spasms in her lower back since yesterday with no relief with ibuprofen.  She is requesting Zanaflex

## 2022-06-13 RX ORDER — MULTIVIT-MIN/IRON/FOLIC ACID/K 18-600-40
CAPSULE ORAL
Qty: 30 TABLET | Refills: 3 | Status: SHIPPED | OUTPATIENT
Start: 2022-06-13

## 2022-07-29 ENCOUNTER — TELEPHONE (OUTPATIENT)
Dept: PRIMARY CARE CLINIC | Age: 67
End: 2022-07-29

## 2022-07-29 NOTE — TELEPHONE ENCOUNTER
Attempted to call patient to let her know there are sooner appts available at [facility] (if she can get transportation to that office) and there was no answer, no vm.

## 2022-08-12 DIAGNOSIS — E78.2 MIXED HYPERLIPIDEMIA: ICD-10-CM

## 2022-08-12 RX ORDER — ATORVASTATIN CALCIUM 40 MG/1
TABLET, FILM COATED ORAL
Qty: 90 TABLET | Refills: 1 | Status: SHIPPED | OUTPATIENT
Start: 2022-08-12

## 2022-08-22 ENCOUNTER — OFFICE VISIT (OUTPATIENT)
Dept: PRIMARY CARE CLINIC | Age: 67
End: 2022-08-22

## 2022-08-22 VITALS
SYSTOLIC BLOOD PRESSURE: 148 MMHG | OXYGEN SATURATION: 96 % | DIASTOLIC BLOOD PRESSURE: 80 MMHG | HEART RATE: 99 BPM | HEIGHT: 65 IN | TEMPERATURE: 97.4 F | WEIGHT: 280 LBS | BODY MASS INDEX: 46.65 KG/M2 | RESPIRATION RATE: 18 BRPM

## 2022-08-22 DIAGNOSIS — H61.22 IMPACTED CERUMEN OF LEFT EAR: ICD-10-CM

## 2022-08-22 DIAGNOSIS — R60.0 BILATERAL LOWER EXTREMITY EDEMA: ICD-10-CM

## 2022-08-22 DIAGNOSIS — H73.92 ABNORMAL TYMPANIC MEMBRANE OF LEFT EAR: Primary | ICD-10-CM

## 2022-08-22 DIAGNOSIS — R53.1 GENERALIZED WEAKNESS: ICD-10-CM

## 2022-08-22 DIAGNOSIS — R63.5 WEIGHT GAIN: ICD-10-CM

## 2022-08-22 DIAGNOSIS — R94.6 ABNORMAL THYROID FUNCTION TEST: ICD-10-CM

## 2022-08-22 DIAGNOSIS — Z72.0 TOBACCO USE: ICD-10-CM

## 2022-08-22 DIAGNOSIS — R06.02 SHORTNESS OF BREATH: ICD-10-CM

## 2022-08-22 DIAGNOSIS — E66.01 MORBID OBESITY WITH BMI OF 40.0-44.9, ADULT (HCC): ICD-10-CM

## 2022-08-22 DIAGNOSIS — Z23 NEED FOR PNEUMOCOCCAL VACCINATION: ICD-10-CM

## 2022-08-22 DIAGNOSIS — J45.20 MILD INTERMITTENT ASTHMA WITHOUT COMPLICATION: ICD-10-CM

## 2022-08-22 LAB
BASOPHILS ABSOLUTE: 0.05 E9/L (ref 0–0.2)
BASOPHILS RELATIVE PERCENT: 0.5 % (ref 0–2)
EOSINOPHILS ABSOLUTE: 0.24 E9/L (ref 0.05–0.5)
EOSINOPHILS RELATIVE PERCENT: 2.6 % (ref 0–6)
HCT VFR BLD CALC: 42.8 % (ref 34–48)
HEMOGLOBIN: 13.6 G/DL (ref 11.5–15.5)
IMMATURE GRANULOCYTES #: 0.06 E9/L
IMMATURE GRANULOCYTES %: 0.7 % (ref 0–5)
LYMPHOCYTES ABSOLUTE: 2.46 E9/L (ref 1.5–4)
LYMPHOCYTES RELATIVE PERCENT: 26.9 % (ref 20–42)
MCH RBC QN AUTO: 31.4 PG (ref 26–35)
MCHC RBC AUTO-ENTMCNC: 31.8 % (ref 32–34.5)
MCV RBC AUTO: 98.8 FL (ref 80–99.9)
MONOCYTES ABSOLUTE: 0.57 E9/L (ref 0.1–0.95)
MONOCYTES RELATIVE PERCENT: 6.2 % (ref 2–12)
NEUTROPHILS ABSOLUTE: 5.77 E9/L (ref 1.8–7.3)
NEUTROPHILS RELATIVE PERCENT: 63.1 % (ref 43–80)
PDW BLD-RTO: 14.7 FL (ref 11.5–15)
PLATELET # BLD: 306 E9/L (ref 130–450)
PMV BLD AUTO: 11.4 FL (ref 7–12)
RBC # BLD: 4.33 E12/L (ref 3.5–5.5)
WBC # BLD: 9.2 E9/L (ref 4.5–11.5)

## 2022-08-22 PROCEDURE — 90677 PCV20 VACCINE IM: CPT | Performed by: FAMILY MEDICINE

## 2022-08-22 PROCEDURE — 1123F ACP DISCUSS/DSCN MKR DOCD: CPT | Performed by: FAMILY MEDICINE

## 2022-08-22 PROCEDURE — 99215 OFFICE O/P EST HI 40 MIN: CPT | Performed by: FAMILY MEDICINE

## 2022-08-22 PROCEDURE — 90471 IMMUNIZATION ADMIN: CPT | Performed by: FAMILY MEDICINE

## 2022-08-22 RX ORDER — AMOXICILLIN 500 MG/1
500 CAPSULE ORAL 2 TIMES DAILY
Qty: 14 CAPSULE | Refills: 0 | Status: SHIPPED | OUTPATIENT
Start: 2022-08-22 | End: 2022-08-29

## 2022-08-22 RX ORDER — ALBUTEROL SULFATE 90 UG/1
AEROSOL, METERED RESPIRATORY (INHALATION)
Qty: 8.5 G | Refills: 1 | Status: SHIPPED | OUTPATIENT
Start: 2022-08-22

## 2022-08-22 RX ORDER — FUROSEMIDE 20 MG/1
20 TABLET ORAL DAILY PRN
Qty: 30 TABLET | Refills: 0 | Status: SHIPPED | OUTPATIENT
Start: 2022-08-22

## 2022-08-22 RX ORDER — POTASSIUM CHLORIDE 750 MG/1
10 TABLET, EXTENDED RELEASE ORAL DAILY PRN
Qty: 30 TABLET | Refills: 0 | Status: SHIPPED | OUTPATIENT
Start: 2022-08-22

## 2022-08-22 RX ORDER — TIZANIDINE 4 MG/1
TABLET ORAL
COMMUNITY
Start: 2022-05-17

## 2022-08-22 ASSESSMENT — ENCOUNTER SYMPTOMS: SHORTNESS OF BREATH: 1

## 2022-08-22 NOTE — PROGRESS NOTES
22  Elena Sales : 1955 Sex: female  Age: 79 y.o. Assessment and Plan:  Hamida Yuen was seen today for thyroid problem, referral - general, swelling and ear fullness. Diagnoses and all orders for this visit:    Abnormal tympanic membrane of left ear  -     AFL - Neli Cody MD, Otolaryngology, [place]  -     amoxicillin (AMOXIL) 500 MG capsule; Take 1 capsule by mouth 2 times daily for 7 days    Weight gain  -     Basic Metabolic Panel; Future  -     CBC with Auto Differential; Future  -     Lipid Panel; Future  -     Hepatic Function Panel; Future    Generalized weakness  -     External Referral To Physical Therapy    Abnormal thyroid function test  -     TSH; Future  -     T4, Free; Future    Bilateral lower extremity edema  -     furosemide (LASIX) 20 MG tablet; Take 1 tablet by mouth daily as needed (swelling)  -     potassium chloride (KLOR-CON M) 10 MEQ extended release tablet; Take 1 tablet by mouth daily as needed (swelling) Only if also taking lasix  -     Basic Metabolic Panel; Future    Mild intermittent asthma without complication  -     albuterol sulfate HFA (PROVENTIL;VENTOLIN;PROAIR) 108 (90 Base) MCG/ACT inhaler; INHALE ONE PUFF EVERY 4 HOURS AS NEEDED  -     Full PFT Study With Bronchodilator; Future    Shortness of breath  -     Full PFT Study With Bronchodilator; Future    Tobacco use  -     Full PFT Study With Bronchodilator; Future    Need for pneumococcal vaccination  -     Pneumococcal, PCV20, PREVNAR 21, (age 25 yrs+), IM, PF    Impacted cerumen of left ear    Morbid obesity with BMI of 40.0-44.9, adult ([facility] [address]  -     Basic Metabolic Panel; Future  -     CBC with Auto Differential; Future  -     Lipid Panel; Future  -     Hepatic Function Panel; Future    Patient's left ear was flushed in the usual manner by staff. She tolerated this without any difficulty, and continued to deny any pain or discomfort.   I am hesitant to use any type of eardrops until they ensure the tympanic membrane is intact. We will start oral amoxicillin, and refer the patient to Dr. CASE, ENT, for further evaluation. She will also be restarted on her Lasix with potassium to be used as needed. She is also been given a refill of her albuterol. We will update routine annual blood work including thyroid. Further recommendations will be made pending results. Patient is amenable to undergoing pulmonary function testing to further elucidate whether her breathing issues are due to worsening asthma, or possibly COPD as well. Pending these results we will try to get her on a controller medication that will help improve her breathing. She will be referred to physical therapy   PCV 20 given today. Patient was given information on the [address] and her request    Total time reviewing and updating the patient's chart as necessary before the encounter, direct time with the patient during the visit, and documentation after the encounter was approximately 44 minutes. Follow-up 1 months to reassess, sooner as necessary    Return in about 4 weeks (around 9/19/2022).         Chief Complaint   Patient presents with    Thyroid Problem    Referral - General    Swelling    Ear Fullness       HPI  Pt here for for routine follow-up  Patient has a lot going on    She has noted increased weight gain   Up about 30lbs since her last OV  Wondering if her thyroid is causing this   We discussed causes of weight gain   She admits to stress eating and overeating   Pt would like information mediterranean diet     She is requesting PT for generalized weakness   Having a lot of trouble even getting her shoes on now  Difficulty with ambulation as well  She is in a wheelchair today    Patient is also noting recurrent lower extremity edema - she thinks she needs to go back on lasix again  She has used it intermittently in the past to help with swelling    Patient continues to complain of some shortness of breath  She does have a history of asthma  She is also still smoking, approximately 2 packs/week, which she reports is improved from her previous habit of 2 packs/day  She is trying to quit using the Nicorette gum    Patient was also recently seen by Dr. Kai Whittington for some foot issues  He recommended a very complicated surgery that she was uncomfortable with  She got a second opinion from Dr. Royal Whitaker  He recommended simply shaving down the area of concern which the patient is more interested in, but has elected to hold off on at this time    Problem list reviewed and updated in full with patient today as necessary. A comprehensive ROS was negative, except as documented above. Review of Systems   HENT:  Negative for ear pain. Hearing loss left ear x 2 weeks, feels like she's in a tunnel      Respiratory:  Positive for shortness of breath. Neurological:  Positive for weakness.          Current Outpatient Medications:     tiZANidine (ZANAFLEX) 4 MG tablet, TAKE 1/2 TABLET BY MOUTH THREE TIMES DAILY AS NEEDED, Disp: , Rfl:     furosemide (LASIX) 20 MG tablet, Take 1 tablet by mouth daily as needed (swelling), Disp: 30 tablet, Rfl: 0    potassium chloride (KLOR-CON M) 10 MEQ extended release tablet, Take 1 tablet by mouth daily as needed (swelling) Only if also taking lasix, Disp: 30 tablet, Rfl: 0    albuterol sulfate HFA (PROVENTIL;VENTOLIN;PROAIR) 108 (90 Base) MCG/ACT inhaler, INHALE ONE PUFF EVERY 4 HOURS AS NEEDED, Disp: 8.5 g, Rfl: 1    amoxicillin (AMOXIL) 500 MG capsule, Take 1 capsule by mouth 2 times daily for 7 days, Disp: 14 capsule, Rfl: 0    atorvastatin (LIPITOR) 40 MG tablet, TAKE ONE TABLET BY MOUTH EVERY DAY, Disp: 90 tablet, Rfl: 1    Vitamin D, Cholecalciferol, 25 MCG (1000 UT) TABS, TAKE ONE TABLET BY MOUTH EVERY DAY, Disp: 30 tablet, Rfl: 3    tiZANidine (ZANAFLEX) 2 MG tablet, Take 1 tablet by mouth 3 times daily as needed (muscle spasms), Disp: 30 tablet, Rfl: 0    triamcinolone (KENALOG) 0.1 % cream, APPLY TO THE AFFECTED AREA(S) TWICE DAILY, Disp: 30 g, Rfl: 0    levothyroxine (SYNTHROID) 25 MCG tablet, Take 1/2 tab daily on an empty stomach.  Do not have any food or drink for 30 minutes after., Disp: 15 tablet, Rfl: 5    LORazepam (ATIVAN) 0.5 MG tablet, TAKE ONE TABLET BY MOUTH THREE TIMES DAILY, Disp: , Rfl:     ARIPiprazole (ABILIFY) 2 MG tablet, TAKE ONE TABLET BY MOUTH EVERY MORNING, Disp: , Rfl:     desvenlafaxine succinate (PRISTIQ) 50 MG TB24 extended release tablet, Take 50 mg by mouth daily, Disp: , Rfl:     Multiple Vitamins-Minerals (MULTIVITAMIN WOMEN 50+ PO), Take by mouth, Disp: , Rfl:     magnesium (MAGNESIUM-OXIDE) 250 MG TABS tablet, Take 250 mg by mouth daily, Disp: , Rfl:     zinc 50 MG CAPS, Take by mouth, Disp: , Rfl:     ferrous sulfate (IRON 325) 325 (65 Fe) MG tablet, Take 325 mg by mouth daily (with breakfast), Disp: , Rfl:     Multiple Vitamins-Minerals (HAIR SKIN AND NAILS FORMULA) TABS, Take by mouth, Disp: , Rfl:     desvenlafaxine succinate (PRISTIQ) 100 MG TB24 extended release tablet, TAKE ONE TABLET BY MOUTH EVERY DAY, Disp: , Rfl:     Calcium Carb-Magnesium Carb (MAGNEBIND 300) 250-300 MG TABS, Take by mouth, Disp: , Rfl:     vitamin C (ASCORBIC ACID) 500 MG tablet, Take 500 mg by mouth daily, Disp: , Rfl:     Omega 3 340 MG CPDR, Take 340 mg by mouth, Disp: , Rfl:     vitamin B-12 (CYANOCOBALAMIN) 1000 MCG tablet, TAKE ONE TABLET BY MOUTH EVERY DAY (Patient taking differently: Take 5,000 mcg by mouth daily), Disp: 30 tablet, Rfl: 3    GARCINIA CAMBOGIA-CHROMIUM PO, , Disp: , Rfl:     buPROPion (WELLBUTRIN XL) 150 MG extended release tablet, TAKE ONE TABLET BY MOUTH EVERY MORNING, Disp: , Rfl: 1    albuterol sulfate HFA (PROVENTIL;VENTOLIN;PROAIR) 108 (90 Base) MCG/ACT inhaler, Inhale into the lungs , Disp: , Rfl:     QUEtiapine (SEROQUEL) 400 MG tablet, TAKE TWO TABLETS BY MOUTH AT BEDTIME, Disp: , Rfl: 5    acetaminophen (TYLENOL) 500 MG tablet, Take 500 mg by mouth every 6 hours as needed for Pain Indications: Four 500mg QD, Disp: , Rfl:     lamoTRIgine (LAMICTAL) 200 MG tablet, Take 200 mg by mouth daily , Disp: , Rfl:     hydrocortisone 2.5 % cream, Apply topically as needed Apply topically 2 times daily. , Disp: , Rfl:   Allergies   Allergen Reactions    Latex      With condom     Amlodipine Besylate     Diphenhydramine      Does the opposite    wired    Hydroxyzine     Sulfa Antibiotics      As a child    Vistaril [Hydroxyzine Hcl]      \"does the complete opposite\"       Pt's past medical and surgical history were reviewed and updated as necessary today   Pt's family and social history were reviewed and updated as necessary today          Vitals:    08/22/22 1206 08/22/22 1210   BP: (!) 152/80 (!) 148/80   Pulse: 99    Resp: 18    Temp: 97.4 °F (36.3 °C)    TempSrc: Temporal    SpO2: 96%    Weight: 280 lb (127 kg)    Height: 5' 5\" (1.651 m)        Physical Exam  Constitutional:       Appearance: Normal appearance. Comments: Patient has unwashed hair and wearing unwashed cloths   HENT:      Head: Normocephalic and atraumatic. Right Ear: Tympanic membrane and ear canal normal.      Ears:      Comments: Right TM and canal within normal limits. Left TM obscured by creamy white cerumen. After flushing, continued having difficulty finding the landmarks, and there was more obvious erythema noted in the distal canal.  Eyes:      Conjunctiva/sclera: Conjunctivae normal.   Cardiovascular:      Rate and Rhythm: Normal rate and regular rhythm. Heart sounds: Normal heart sounds. Pulmonary:      Effort: Pulmonary effort is normal.      Breath sounds: Normal breath sounds. Abdominal:      Palpations: Abdomen is soft. Tenderness: There is no abdominal tenderness. Musculoskeletal:      Comments: Patient is in a wheelchair   Skin:     General: Skin is warm and dry. Neurological:      General: No focal deficit present.       Mental Status: She is alert and oriented to person, place, and time. Psychiatric:         Mood and Affect: Mood normal.         Behavior: Behavior normal.         Counseled patient as appropriate and relevant regarding above diagnosis, including possible risks and complications, especially if left uncontrolled. Counseled patient as appropriate and relevant regarding any  possible side effects, risks, and alternatives to treatment; patient and/or guardian verbalizes understanding, and is in agreement with the plan as detailed above. Reviewed age and gender appropriate health screening exams and vaccinations. Advised patient regarding importance of keeping up with recommended health maintenance and to schedule as soon as possible if overdue, as this is important in assessing for undiagnosed pathology, especially cancer, as well as protecting against potentially harmful/life threatening disease. If discussed, any educational materials and/or home exercises printed for patient's review and were included in patient instructions on his/her After Visit Summary and given to patient at the end of visit. Advised patient to call with any new medication issues, and and other concerns/complaints prior to scheduled follow up. All questions answered to the patient's satisfaction.         Seen By:  Tex Cooper MD

## 2022-08-22 NOTE — PATIENT INSTRUCTIONS
Learning About the [address] [facility] Diet  What is the Mediterranean diet? The Mediterranean diet is a style of eating rather than a diet plan. It features foods eaten in [place], [place], [place], and other countries along the [facility]. It emphasizes eating foods like fish, fruits, vegetables, beans, high-fiber breads and whole grains, nuts, and olive oil. This style of eating includes limited red meat, cheese, and sweets. Why choose the Mediterranean diet? A Mediterranean-style diet may improve heart health. It contains more fat than other heart-healthy diets. But the fats are mainly from nuts, unsaturated oils (such as fish oils and olive oil), and certain nut or seed oils (such as canola, soybean, or flaxseed oil). These fats may help protect the heart and blood vessels. How can you get started on the Mediterranean diet? Here are some things you can do to switch to a more Mediterranean way of eating. What to eat  Eat a variety of fruits and vegetables each day, such as grapes, blueberries, tomatoes, broccoli, peppers, figs, olives, spinach, eggplant, beans, lentils, and chickpeas. Eat a variety of whole-grain foods each day, such as oats, brown rice, and whole wheat bread, pasta, and couscous. Eat fish at least 2 times a week. Try tuna, salmon, mackerel, lake trout, herring, or sardines. Eat moderate amounts of low-fat dairy products, such as milk, cheese, or yogurt. Eat moderate amounts of poultry and eggs. Choose healthy (unsaturated) fats, such as nuts, olive oil, and certain nut or seed oils like canola, soybean, and flaxseed. Limit unhealthy (saturated) fats, such as butter, palm oil, and coconut oil. And limit fats found in animal products, such as meat and dairy products made with whole milk. Try to eat red meat only a few times a month in very small amounts. Limit sweets and desserts to only a few times a week. This includes sugar-sweetened drinks like soda.   The Mediterranean diet may also include red wine with your meal--1 glass each day for women and up to 2 glasses a day for men. Tips for eating at home  Use herbs, spices, garlic, lemon zest, and citrus juice instead of salt to add flavor to foods. Add avocado slices to your sandwich instead of mccauley. Have fish for lunch or dinner instead of red meat. Brush the fish with olive oil, and broil or grill it. Sprinkle your salad with seeds or nuts instead of cheese. Cook with olive or canola oil instead of butter or oils that are high in saturated fat. Switch from 2% milk or whole milk to 1% or fat-free milk. Dip raw vegetables in a vinaigrette dressing or hummus instead of dips made from mayonnaise or sour cream.  Have a piece of fruit for dessert instead of a piece of cake. Try baked apples, or have some dried fruit. Tips for eating out  Try broiled, grilled, baked, or poached fish instead of having it fried or breaded. Ask your  to have your meals prepared with olive oil instead of butter. Order dishes made with marinara sauce or sauces made from olive oil. Avoid sauces made from cream or mayonnaise. Choose whole-grain breads, whole wheat pasta and pizza crust, brown rice, beans, and lentils. Cut back on butter or margarine on bread. Instead, you can dip your bread in a small amount of olive oil. Ask for a side salad or grilled vegetables instead of french fries or chips. Where can you learn more? Go to https://[employer]. org and sign in to your [employer] account. Enter [phone] in the [facility] box to learn more about \"Learning About the Mediterranean Diet. \"     If you do not have an account, please click on the \"Sign Up Now\" link. Current as of: August 22, 2019               Content Version: 12.6  © 9632-0495 [employer]. Care instructions adapted under license by [facility] ([facility]).  If you have questions about a medical condition or this instruction, always ask your healthcare professional. [place] [address] any warranty or liability for your use of this information.

## 2022-08-23 LAB
ALBUMIN SERPL-MCNC: 4.4 G/DL (ref 3.5–5.2)
ALP BLD-CCNC: 133 U/L (ref 35–104)
ALT SERPL-CCNC: 30 U/L (ref 0–32)
ANION GAP SERPL CALCULATED.3IONS-SCNC: 13 MMOL/L (ref 7–16)
AST SERPL-CCNC: 28 U/L (ref 0–31)
BILIRUB SERPL-MCNC: <0.2 MG/DL (ref 0–1.2)
BILIRUBIN DIRECT: <0.2 MG/DL (ref 0–0.3)
BILIRUBIN, INDIRECT: ABNORMAL MG/DL (ref 0–1)
BUN BLDV-MCNC: 19 MG/DL (ref 6–23)
CALCIUM SERPL-MCNC: 9.6 MG/DL (ref 8.6–10.2)
CHLORIDE BLD-SCNC: 104 MMOL/L (ref 98–107)
CHOLESTEROL, TOTAL: 162 MG/DL (ref 0–199)
CO2: 26 MMOL/L (ref 22–29)
CREAT SERPL-MCNC: 0.8 MG/DL (ref 0.5–1)
GFR AFRICAN AMERICAN: >60
GFR NON-AFRICAN AMERICAN: >60 ML/MIN/1.73
GLUCOSE BLD-MCNC: 89 MG/DL (ref 74–99)
HDLC SERPL-MCNC: 54 MG/DL
LDL CHOLESTEROL CALCULATED: 77 MG/DL (ref 0–99)
POTASSIUM SERPL-SCNC: 4.5 MMOL/L (ref 3.5–5)
SODIUM BLD-SCNC: 143 MMOL/L (ref 132–146)
T4 FREE: 0.69 NG/DL (ref 0.93–1.7)
TOTAL PROTEIN: 7.5 G/DL (ref 6.4–8.3)
TRIGL SERPL-MCNC: 156 MG/DL (ref 0–149)
TSH SERPL DL<=0.05 MIU/L-ACNC: 1.88 UIU/ML (ref 0.27–4.2)
VLDLC SERPL CALC-MCNC: 31 MG/DL

## 2022-09-06 RX ORDER — LEVOTHYROXINE SODIUM 0.03 MG/1
TABLET ORAL
Qty: 90 TABLET | Refills: 1 | Status: SHIPPED | OUTPATIENT
Start: 2022-09-06

## 2022-10-03 ENCOUNTER — TELEPHONE (OUTPATIENT)
Dept: PRIMARY CARE CLINIC | Age: 67
End: 2022-10-03

## 2022-10-03 DIAGNOSIS — R53.1 GENERALIZED WEAKNESS: Primary | ICD-10-CM

## 2022-10-03 RX ORDER — BENZONATATE 100 MG/1
100 CAPSULE ORAL 3 TIMES DAILY PRN
Qty: 30 CAPSULE | Refills: 0 | Status: SHIPPED | OUTPATIENT
Start: 2022-10-03 | End: 2022-10-10

## 2022-10-03 NOTE — TELEPHONE ENCOUNTER
----- Message from Balaji Shaikh LPN sent at 10/6/0882 11:39 AM EDT -----  Subject: Message to Provider    QUESTIONS  Information for Provider? Patient would like to know what can be done for   the cough related to her positive covid mainly a cough also needs script   sent to [facility] to continue PT  ---------------------------------------------------------------------------  --------------  [id number] [employer]  [phone]; OK to leave message on voicemail  ---------------------------------------------------------------------------  --------------  SCRIPT ANSWERS  Relationship to Patient?  Self

## 2022-11-14 ENCOUNTER — OFFICE VISIT (OUTPATIENT)
Dept: PRIMARY CARE CLINIC | Age: 67
End: 2022-11-14
Payer: MEDICAID

## 2022-11-14 VITALS
OXYGEN SATURATION: 100 % | TEMPERATURE: 97.9 F | BODY MASS INDEX: 47.98 KG/M2 | SYSTOLIC BLOOD PRESSURE: 128 MMHG | DIASTOLIC BLOOD PRESSURE: 74 MMHG | RESPIRATION RATE: 18 BRPM | WEIGHT: 288 LBS | HEIGHT: 65 IN | HEART RATE: 80 BPM

## 2022-11-14 DIAGNOSIS — Z23 NEED FOR INFLUENZA VACCINATION: ICD-10-CM

## 2022-11-14 DIAGNOSIS — R06.02 SHORTNESS OF BREATH: ICD-10-CM

## 2022-11-14 DIAGNOSIS — M54.50 CHRONIC MIDLINE LOW BACK PAIN WITHOUT SCIATICA: Primary | ICD-10-CM

## 2022-11-14 DIAGNOSIS — G89.29 CHRONIC PAIN OF BOTH KNEES: ICD-10-CM

## 2022-11-14 DIAGNOSIS — R60.0 BILATERAL LOWER EXTREMITY EDEMA: ICD-10-CM

## 2022-11-14 DIAGNOSIS — M25.561 CHRONIC PAIN OF BOTH KNEES: ICD-10-CM

## 2022-11-14 DIAGNOSIS — R94.6 ABNORMAL THYROID FUNCTION TEST: ICD-10-CM

## 2022-11-14 DIAGNOSIS — J45.20 MILD INTERMITTENT ASTHMA WITHOUT COMPLICATION: ICD-10-CM

## 2022-11-14 DIAGNOSIS — M25.562 CHRONIC PAIN OF BOTH KNEES: ICD-10-CM

## 2022-11-14 DIAGNOSIS — G89.29 CHRONIC MIDLINE LOW BACK PAIN WITHOUT SCIATICA: Primary | ICD-10-CM

## 2022-11-14 LAB
ANION GAP SERPL CALCULATED.3IONS-SCNC: 14 MMOL/L (ref 7–16)
BUN BLDV-MCNC: 12 MG/DL (ref 6–23)
CALCIUM SERPL-MCNC: 9.6 MG/DL (ref 8.6–10.2)
CHLORIDE BLD-SCNC: 102 MMOL/L (ref 98–107)
CO2: 27 MMOL/L (ref 22–29)
CREAT SERPL-MCNC: 0.7 MG/DL (ref 0.5–1)
GFR SERPL CREATININE-BSD FRML MDRD: >60 ML/MIN/1.73
GLUCOSE BLD-MCNC: 96 MG/DL (ref 74–99)
POTASSIUM SERPL-SCNC: 4.1 MMOL/L (ref 3.5–5)
SODIUM BLD-SCNC: 143 MMOL/L (ref 132–146)
T4 FREE: 0.86 NG/DL (ref 0.93–1.7)
TSH SERPL DL<=0.05 MIU/L-ACNC: 1.97 UIU/ML (ref 0.27–4.2)

## 2022-11-14 PROCEDURE — 90471 IMMUNIZATION ADMIN: CPT | Performed by: FAMILY MEDICINE

## 2022-11-14 PROCEDURE — G8427 DOCREV CUR MEDS BY ELIG CLIN: HCPCS | Performed by: FAMILY MEDICINE

## 2022-11-14 PROCEDURE — 99214 OFFICE O/P EST MOD 30 MIN: CPT | Performed by: FAMILY MEDICINE

## 2022-11-14 PROCEDURE — 3017F COLORECTAL CA SCREEN DOC REV: CPT | Performed by: FAMILY MEDICINE

## 2022-11-14 PROCEDURE — 1123F ACP DISCUSS/DSCN MKR DOCD: CPT | Performed by: FAMILY MEDICINE

## 2022-11-14 PROCEDURE — G8417 CALC BMI ABV UP PARAM F/U: HCPCS | Performed by: FAMILY MEDICINE

## 2022-11-14 PROCEDURE — 1090F PRES/ABSN URINE INCON ASSESS: CPT | Performed by: FAMILY MEDICINE

## 2022-11-14 PROCEDURE — G8400 PT W/DXA NO RESULTS DOC: HCPCS | Performed by: FAMILY MEDICINE

## 2022-11-14 PROCEDURE — 3078F DIAST BP <80 MM HG: CPT | Performed by: FAMILY MEDICINE

## 2022-11-14 PROCEDURE — G8484 FLU IMMUNIZE NO ADMIN: HCPCS | Performed by: FAMILY MEDICINE

## 2022-11-14 PROCEDURE — 1036F TOBACCO NON-USER: CPT | Performed by: FAMILY MEDICINE

## 2022-11-14 PROCEDURE — 90694 VACC AIIV4 NO PRSRV 0.5ML IM: CPT | Performed by: FAMILY MEDICINE

## 2022-11-14 PROCEDURE — 3074F SYST BP LT 130 MM HG: CPT | Performed by: FAMILY MEDICINE

## 2022-11-14 RX ORDER — POTASSIUM CHLORIDE 750 MG/1
10 TABLET, EXTENDED RELEASE ORAL DAILY PRN
Qty: 30 TABLET | Refills: 0 | Status: SHIPPED | OUTPATIENT
Start: 2022-11-14

## 2022-11-14 RX ORDER — MELOXICAM 7.5 MG/1
7.5 TABLET ORAL DAILY
Qty: 30 TABLET | Refills: 3 | Status: SHIPPED | OUTPATIENT
Start: 2022-11-14

## 2022-11-14 RX ORDER — FUROSEMIDE 20 MG/1
20 TABLET ORAL DAILY PRN
Qty: 30 TABLET | Refills: 0 | Status: SHIPPED | OUTPATIENT
Start: 2022-11-14

## 2022-11-14 NOTE — PROGRESS NOTES
22  Elena Sales : 1955 Sex: female  Age: 79 y.o. Assessment and Plan:  Anju Aurora was seen today for follow-up and swelling. Diagnoses and all orders for this visit:    Chronic midline low back pain without sciatica  -     meloxicam (MOBIC) 7.5 MG tablet; Take 1 tablet by mouth daily  -     External Referral To Physical Therapy    Bilateral lower extremity edema  -     potassium chloride (KLOR-CON M) 10 MEQ extended release tablet; Take 1 tablet by mouth daily as needed (swelling) Only if also taking lasix  -     furosemide (LASIX) 20 MG tablet; Take 1 tablet by mouth daily as needed (swelling)  -     Basic Metabolic Panel; Future    Chronic pain of both knees  -     meloxicam (MOBIC) 7.5 MG tablet; Take 1 tablet by mouth daily  -     External Referral To Physical Therapy    Shortness of breath  -     Full PFT Study With Bronchodilator; Future    Mild intermittent asthma without complication  -     Full PFT Study With Bronchodilator; Future    Abnormal thyroid function test  -     TSH; Future  -     T4, Free; Future    Need for influenza vaccination  -     Influenza, FLUAD, (age 72 y+), IM, Preservative Free, 0.5 mL    Trial of meloxicam for her low back pain and bilateral knee pain  She will also be further physical therapy  Check PFTs  Also due for updated thyroid labs  Further recommendations pending results  Patient is amenable to flu shot today  Smoking cessation strongly encouraged    Return in about 6 weeks (around 2022).         Chief Complaint   Patient presents with    Follow-up    Swelling     BLE        HPI  Pt here for routine f/u   Still having some back pain, knees killing her   Pt believes her weight gain has worsened all of this   Planning to start a Mediterranean Diet; requesting a cookbook for [place]  Currently she is not really doing anything for it  Uses Tylenol, 2-3 times a day  Doesn't note any improvement for her OA pain though  Hasn't been able to start PT yet, needs new Rx for this actually    Hypothyroidism - on levo supplementation   She sometimes misses her meds if she forgets in the morning     PACO - generally well controlled  Lasix/K only PRN - maybe just once a month     Asthma - has been   States sometimes forgets to use her inhaler - but it's only as needed   Pt is a smoker - down to 3 packs/week  Never scheduled the PFTs as previously ordered     Problem list reviewed and updated in full with patient today as necessary. A comprehensive ROS was negative, except as documented above. Current Outpatient Medications:     potassium chloride (KLOR-CON M) 10 MEQ extended release tablet, Take 1 tablet by mouth daily as needed (swelling) Only if also taking lasix, Disp: 30 tablet, Rfl: 0    meloxicam (MOBIC) 7.5 MG tablet, Take 1 tablet by mouth daily, Disp: 30 tablet, Rfl: 3    furosemide (LASIX) 20 MG tablet, Take 1 tablet by mouth daily as needed (swelling), Disp: 30 tablet, Rfl: 0    levothyroxine (SYNTHROID) 25 MCG tablet, Take 1 tab daily on an empty stomach.  Do not have any food or drink for 30 minutes after., Disp: 90 tablet, Rfl: 1    albuterol sulfate HFA (PROVENTIL;VENTOLIN;PROAIR) 108 (90 Base) MCG/ACT inhaler, INHALE ONE PUFF EVERY 4 HOURS AS NEEDED, Disp: 8.5 g, Rfl: 1    atorvastatin (LIPITOR) 40 MG tablet, TAKE ONE TABLET BY MOUTH EVERY DAY, Disp: 90 tablet, Rfl: 1    Vitamin D, Cholecalciferol, 25 MCG (1000 UT) TABS, TAKE ONE TABLET BY MOUTH EVERY DAY, Disp: 30 tablet, Rfl: 3    triamcinolone (KENALOG) 0.1 % cream, APPLY TO THE AFFECTED AREA(S) TWICE DAILY, Disp: 30 g, Rfl: 0    LORazepam (ATIVAN) 0.5 MG tablet, TAKE ONE TABLET BY MOUTH THREE TIMES DAILY, Disp: , Rfl:     ARIPiprazole (ABILIFY) 2 MG tablet, TAKE ONE TABLET BY MOUTH EVERY MORNING, Disp: , Rfl:     desvenlafaxine succinate (PRISTIQ) 50 MG TB24 extended release tablet, Take 50 mg by mouth daily, Disp: , Rfl:     desvenlafaxine succinate (PRISTIQ) 100 MG TB24 extended release tablet, TAKE ONE TABLET BY MOUTH EVERY DAY, Disp: , Rfl:     buPROPion (WELLBUTRIN XL) 150 MG extended release tablet, TAKE ONE TABLET BY MOUTH EVERY MORNING, Disp: , Rfl: 1    QUEtiapine (SEROQUEL) 400 MG tablet, TAKE TWO TABLETS BY MOUTH AT BEDTIME, Disp: , Rfl: 5    acetaminophen (TYLENOL) 500 MG tablet, Take 500 mg by mouth every 6 hours as needed for Pain Indications: Four 500mg QD, Disp: , Rfl:     lamoTRIgine (LAMICTAL) 200 MG tablet, Take 200 mg by mouth daily , Disp: , Rfl:     hydrocortisone 2.5 % cream, Apply topically as needed Apply topically 2 times daily. , Disp: , Rfl:   Allergies   Allergen Reactions    Latex      With condom     Amlodipine Besylate     Diphenhydramine      Does the opposite    wired    Hydroxyzine     Sulfa Antibiotics      As a child    Vistaril [Hydroxyzine Hcl]      \"does the complete opposite\"       Pt's past medical and surgical history were reviewed and updated as necessary today   Pt's family and social history were reviewed and updated as necessary today      Vitals:    11/14/22 1258   Resp: 18   Weight: 288 lb (130.6 kg)   Height: 5' 5\" (1.651 m)       Physical Exam  Constitutional:       Appearance: Normal appearance. HENT:      Head: Normocephalic and atraumatic. Eyes:      Conjunctiva/sclera: Conjunctivae normal.   Cardiovascular:      Rate and Rhythm: Normal rate and regular rhythm. Comments: Very distant   Pulmonary:      Effort: Pulmonary effort is normal.      Breath sounds: Wheezing present. Musculoskeletal:      Comments: Pt in wheelchair   Skin:     General: Skin is warm and dry. Neurological:      General: No focal deficit present. Mental Status: She is alert and oriented to person, place, and time.    Psychiatric:         Mood and Affect: Mood normal.         Behavior: Behavior normal.       Counseled patient as appropriate and relevant regarding above diagnosis, including possible risks and complications, especially if left uncontrolled. Counseled patient as appropriate and relevant regarding any  possible side effects, risks, and alternatives to treatment; patient and/or guardian verbalizes understanding, and is in agreement with the plan as detailed above. Reviewed age and gender appropriate health screening exams and vaccinations. Advised patient regarding importance of keeping up with recommended health maintenance and to schedule as soon as possible if overdue, as this is important in assessing for undiagnosed pathology, especially cancer, as well as protecting against potentially harmful/life threatening disease. If discussed, any educational materials and/or home exercises printed for patient's review and were included in patient instructions on his/her After Visit Summary and given to patient at the end of visit. Advised patient to call with any new medication issues, and and other concerns/complaints prior to scheduled follow up. All questions answered to the patient's satisfaction.         Seen By:  Bella Crandall MD

## 2022-11-21 ENCOUNTER — TELEPHONE (OUTPATIENT)
Dept: PRIMARY CARE CLINIC | Age: 67
End: 2022-11-21

## 2022-11-21 RX ORDER — TIZANIDINE 2 MG/1
2 TABLET ORAL 3 TIMES DAILY PRN
Qty: 30 TABLET | Refills: 0 | Status: SHIPPED | OUTPATIENT
Start: 2022-11-21

## 2022-11-21 NOTE — TELEPHONE ENCOUNTER
Patient notified and states she already has an issue with bladder function. She states that sometimes she has issues getting from her recliner to the bathroom without having an accident. She wanted you to be aware of this.

## 2022-11-21 NOTE — TELEPHONE ENCOUNTER
Last Appointment:  11/14/2022  Future Appointments   Date Time Provider Sushma Yvonne   12/28/2022 10:15 AM Gilbert Horton MD [facility]   12/28/2022 10:30 AM Gilbert Horton MD [facility]      Patient called to ask for medication for pain/muscle spasms in lower back.      Electronically signed by Olena Andrea LPN on 24/98/7057 at 11:01 AM

## 2022-12-08 DIAGNOSIS — R60.0 BILATERAL LOWER EXTREMITY EDEMA: ICD-10-CM

## 2022-12-08 RX ORDER — POTASSIUM CHLORIDE 750 MG/1
10 TABLET, EXTENDED RELEASE ORAL DAILY PRN
Qty: 30 TABLET | Refills: 0 | Status: SHIPPED | OUTPATIENT
Start: 2022-12-08

## 2022-12-20 ENCOUNTER — TELEPHONE (OUTPATIENT)
Dept: AUDIOLOGY | Age: 67
End: 2022-12-20

## 2022-12-20 NOTE — TELEPHONE ENCOUNTER
Referral for Hearing Evaluation received. Called patient and phone just rang. No answer, no voicemail.      Electronically signed by Jamie Bryson on 12/20/22 at 11:54 AM EST

## 2022-12-28 ENCOUNTER — OFFICE VISIT (OUTPATIENT)
Dept: PRIMARY CARE CLINIC | Age: 67
End: 2022-12-28

## 2022-12-28 VITALS
DIASTOLIC BLOOD PRESSURE: 60 MMHG | OXYGEN SATURATION: 93 % | RESPIRATION RATE: 18 BRPM | WEIGHT: 286 LBS | TEMPERATURE: 97.1 F | BODY MASS INDEX: 47.65 KG/M2 | HEART RATE: 94 BPM | HEIGHT: 65 IN | SYSTOLIC BLOOD PRESSURE: 120 MMHG

## 2022-12-28 DIAGNOSIS — R60.0 BILATERAL LOWER EXTREMITY EDEMA: ICD-10-CM

## 2022-12-28 DIAGNOSIS — Z78.9 UNABLE TO CARE FOR SELF: Primary | ICD-10-CM

## 2022-12-28 DIAGNOSIS — R06.02 SOB (SHORTNESS OF BREATH): ICD-10-CM

## 2022-12-28 RX ORDER — TIZANIDINE 4 MG/1
TABLET ORAL
COMMUNITY
Start: 2022-11-21 | End: 2022-12-28

## 2022-12-28 NOTE — PROGRESS NOTES
22  Elena Sales : 1955 Sex: female  Age: 79 y.o. Assessment and Plan:  Erik Chahal was seen today for foot swelling and spasms. Diagnoses and all orders for this visit:    Unable to care for self    Bilateral lower extremity edema    SOB (shortness of breath)    After long conversation pt agrees to be transported to ER by EMS  I am not overly suspicious for CHF though this will need to be ruled out  Report called to ER by [address] [place] [address] Dr Nichols for after hospitalization . Chief Complaint   Patient presents with    Foot Swelling     B/L, about a month.  Worse in the past week     Spasms     Requesting muscle relaxer        HPI  Pt here for routine f/u    B/L lower extremity swelling   Noted over the past month   Taking lasix daily without relief   No associated SOB   Has limited her mobility and to the point she cannot put on her shoes and socks   Pt is very disheveled and malodorous  Discussed with her that I am very concerned about her ability to care for herself at home given her report and current state  Pt is in agreement  She actually brought a hospital bag with her thinking I could directly admit her   She also reports some SOB and is worried about congestive heart failure      States she decided her last cigarette was yesterday  Called the quit line      Component      Latest Ref Rng & Units 2022           1:44 PM  1:44 PM  1:44 PM   Sodium      132 - 146 mmol/L   143   Potassium      3.5 - 5.0 mmol/L   4.1   Chloride      98 - 107 mmol/L   102   CO2      22 - 29 mmol/L   27   Anion Gap      7 - 16 mmol/L   14   Glucose, Random      74 - 99 mg/dL   96   BUN,BUNPL      6 - 23 mg/dL   12   Creatinine      0.5 - 1.0 mg/dL   0.7   Est, Glom Filt Rate      >=60 mL/min/1.73   >60   CALCIUM, SERUM, 334579      8.6 - 10.2 mg/dL   9.6   T4 Free      0.93 - 1.70 ng/dL  0.86 (L)    TSH      0.270 - 4.200 uIU/mL 1.970         Problem list reviewed and updated in full with patient today as necessary. A comprehensive ROS was negative, except as documented above. Current Outpatient Medications:     potassium chloride (KLOR-CON M) 10 MEQ extended release tablet, Take 1 tablet by mouth daily as needed (swelling) Only if also taking lasix, Disp: 30 tablet, Rfl: 0    tiZANidine (ZANAFLEX) 2 MG tablet, Take 1 tablet by mouth 3 times daily as needed (back spasms), Disp: 30 tablet, Rfl: 0    meloxicam (MOBIC) 7.5 MG tablet, Take 1 tablet by mouth daily, Disp: 30 tablet, Rfl: 3    furosemide (LASIX) 20 MG tablet, Take 1 tablet by mouth daily as needed (swelling), Disp: 30 tablet, Rfl: 0    levothyroxine (SYNTHROID) 25 MCG tablet, Take 1 tab daily on an empty stomach.  Do not have any food or drink for 30 minutes after., Disp: 90 tablet, Rfl: 1    albuterol sulfate HFA (PROVENTIL;VENTOLIN;PROAIR) 108 (90 Base) MCG/ACT inhaler, INHALE ONE PUFF EVERY 4 HOURS AS NEEDED, Disp: 8.5 g, Rfl: 1    atorvastatin (LIPITOR) 40 MG tablet, TAKE ONE TABLET BY MOUTH EVERY DAY, Disp: 90 tablet, Rfl: 1    Vitamin D, Cholecalciferol, 25 MCG (1000 UT) TABS, TAKE ONE TABLET BY MOUTH EVERY DAY, Disp: 30 tablet, Rfl: 3    triamcinolone (KENALOG) 0.1 % cream, APPLY TO THE AFFECTED AREA(S) TWICE DAILY, Disp: 30 g, Rfl: 0    LORazepam (ATIVAN) 0.5 MG tablet, TAKE ONE TABLET BY MOUTH THREE TIMES DAILY, Disp: , Rfl:     ARIPiprazole (ABILIFY) 2 MG tablet, TAKE ONE TABLET BY MOUTH EVERY MORNING, Disp: , Rfl:     desvenlafaxine succinate (PRISTIQ) 50 MG TB24 extended release tablet, Take 50 mg by mouth daily, Disp: , Rfl:     desvenlafaxine succinate (PRISTIQ) 100 MG TB24 extended release tablet, TAKE ONE TABLET BY MOUTH EVERY DAY, Disp: , Rfl:     buPROPion (WELLBUTRIN XL) 150 MG extended release tablet, TAKE ONE TABLET BY MOUTH EVERY MORNING, Disp: , Rfl: 1    QUEtiapine (SEROQUEL) 400 MG tablet, TAKE TWO TABLETS BY MOUTH AT BEDTIME, Disp: , Rfl: 5    acetaminophen (TYLENOL) 500 MG tablet, Take 500 mg by mouth every 6 hours as needed for Pain Indications: Four 500mg QD, Disp: , Rfl:     lamoTRIgine (LAMICTAL) 200 MG tablet, Take 200 mg by mouth daily , Disp: , Rfl:     hydrocortisone 2.5 % cream, Apply topically as needed Apply topically 2 times daily. , Disp: , Rfl:   Allergies   Allergen Reactions    Latex      With condom     Amlodipine Besylate     Diphenhydramine      Does the opposite    wired    Hydroxyzine     Sulfa Antibiotics      As a child    Vistaril [Hydroxyzine Hcl]      \"does the complete opposite\"       Pt's past medical and surgical history were reviewed and updated as necessary today   Pt's family and social history were reviewed and updated as necessary today      Pt lives alone with her cat     Vitals:    12/28/22 1017   BP: 120/60   Pulse: 94   Resp: 18   Temp: 97.1 °F (36.2 °C)   TempSrc: Temporal   SpO2: 93%   Weight: 286 lb (129.7 kg)   Height: 5' 5\" (1.651 m)       Physical Exam  Constitutional:       Comments: Hair is matted, pt is unkept    HENT:      Head: Normocephalic and atraumatic. Eyes:      Conjunctiva/sclera: Conjunctivae normal.   Cardiovascular:      Rate and Rhythm: Normal rate and regular rhythm. Heart sounds: Normal heart sounds. Pulmonary:      Effort: Pulmonary effort is normal.      Breath sounds: Normal breath sounds. Abdominal:      Palpations: Abdomen is soft. Tenderness: There is no abdominal tenderness. Musculoskeletal:      Right lower leg: Edema present. Left lower leg: Edema present. Comments: Pt is in a wheelchair    Skin:     General: Skin is warm and dry. Neurological:      General: No focal deficit present. Mental Status: She is alert and oriented to person, place, and time. Psychiatric:         Mood and Affect: Mood normal.         Behavior: Behavior normal.        Counseled patient as appropriate and relevant regarding above diagnosis, including possible risks and complications, especially if left uncontrolled.   Counseled patient as appropriate and relevant regarding any  possible side effects, risks, and alternatives to treatment; patient and/or guardian verbalizes understanding, and is in agreement with the plan as detailed above. Reviewed age and gender appropriate health screening exams and vaccinations. Advised patient regarding importance of keeping up with recommended health maintenance and to schedule as soon as possible if overdue, as this is important in assessing for undiagnosed pathology, especially cancer, as well as protecting against potentially harmful/life threatening disease. If discussed, any educational materials and/or home exercises printed for patient's review and were included in patient instructions on his/her After Visit Summary and given to patient at the end of visit. Advised patient to call with any new medication issues, and and other concerns/complaints prior to scheduled follow up. All questions answered to the patient's satisfaction.         Seen By:  Sara Cazares MD

## 2023-01-11 ENCOUNTER — FOLLOWUP TELEPHONE ENCOUNTER (OUTPATIENT)
Dept: AUDIOLOGY | Age: 68
End: 2023-01-11

## 2023-01-11 NOTE — TELEPHONE ENCOUNTER
Referral for Hearing Evaluation received. Called patient and left voicemail for the patient to call back for scheduling.     Electronically signed by Jamie Craft on 1/11/23 at 9:54 AM EST

## 2023-01-17 ENCOUNTER — TELEPHONE (OUTPATIENT)
Dept: AUDIOLOGY | Age: 68
End: 2023-01-17

## 2023-01-17 NOTE — TELEPHONE ENCOUNTER
Called patient to schedule hearing eval from Dr. AQUINOMIFABIEN [facility] referral. (3rd attempt today)  She stated that she does not feel that she has trouble with her hearing. She can call back as needed. Will scan order.    Electronically signed by Jamie Esposito on 1/17/2023 at 2:06 PM

## 2023-02-28 DIAGNOSIS — E78.2 MIXED HYPERLIPIDEMIA: ICD-10-CM

## 2023-02-28 RX ORDER — ATORVASTATIN CALCIUM 40 MG/1
TABLET, FILM COATED ORAL
Qty: 90 TABLET | Refills: 1 | Status: SHIPPED | OUTPATIENT
Start: 2023-02-28

## 2023-02-28 RX ORDER — LEVOTHYROXINE SODIUM 0.03 MG/1
TABLET ORAL
Qty: 90 TABLET | Refills: 1 | Status: SHIPPED | OUTPATIENT
Start: 2023-02-28

## 2023-03-01 ENCOUNTER — TELEPHONE (OUTPATIENT)
Dept: PRIMARY CARE CLINIC | Age: 68
End: 2023-03-01

## 2023-03-01 NOTE — TELEPHONE ENCOUNTER
----- Message from "[employer] sent at 3/1/2023  9:17 AM EST -----  Subject: Hospital Follow Up    QUESTIONS  What hospital was the Patient Discharged from? [facility]  Date of Discharge? 2023-03-01  Discharge Location? Nursing Facility  Reason for hospitalization as patient stated? Dc from nursing home 3/1   needs follow up in 2 wks  What question does the patient have, if applicable? Raphael [address] [place] [address] number listed below  ---------------------------------------------------------------------------  --------------  CALL BACK INFO  What is the best way for the office to contact you? OK to leave message on   voicemail  Preferred Call Back Phone Number? [phone]  ---------------------------------------------------------------------------  --------------  SCRIPT ANSWERS  Relationship to Patient? Third Party  Third Party Type? Nursing Home? Representative Name?  Serg Albrecht

## 2023-03-06 ENCOUNTER — OFFICE VISIT (OUTPATIENT)
Dept: PRIMARY CARE CLINIC | Age: 68
End: 2023-03-06
Payer: COMMERCIAL

## 2023-03-06 VITALS
WEIGHT: 286 LBS | OXYGEN SATURATION: 99 % | BODY MASS INDEX: 47.65 KG/M2 | DIASTOLIC BLOOD PRESSURE: 58 MMHG | SYSTOLIC BLOOD PRESSURE: 144 MMHG | HEIGHT: 65 IN | TEMPERATURE: 97 F | HEART RATE: 100 BPM | RESPIRATION RATE: 16 BRPM

## 2023-03-06 DIAGNOSIS — M54.2 NECK PAIN, CHRONIC: ICD-10-CM

## 2023-03-06 DIAGNOSIS — M79.9 SOFT TISSUE DISORDER: ICD-10-CM

## 2023-03-06 DIAGNOSIS — R26.2 AMBULATORY DYSFUNCTION: ICD-10-CM

## 2023-03-06 DIAGNOSIS — G89.29 NECK PAIN, CHRONIC: ICD-10-CM

## 2023-03-06 DIAGNOSIS — M54.50 LUMBAR BACK PAIN: ICD-10-CM

## 2023-03-06 DIAGNOSIS — R60.0 BILATERAL LOWER EXTREMITY EDEMA: Primary | ICD-10-CM

## 2023-03-06 PROCEDURE — 3074F SYST BP LT 130 MM HG: CPT | Performed by: FAMILY MEDICINE

## 2023-03-06 PROCEDURE — 99214 OFFICE O/P EST MOD 30 MIN: CPT | Performed by: FAMILY MEDICINE

## 2023-03-06 PROCEDURE — 1123F ACP DISCUSS/DSCN MKR DOCD: CPT | Performed by: FAMILY MEDICINE

## 2023-03-06 PROCEDURE — 3078F DIAST BP <80 MM HG: CPT | Performed by: FAMILY MEDICINE

## 2023-03-06 RX ORDER — POTASSIUM CHLORIDE 20 MEQ/1
20 TABLET, EXTENDED RELEASE ORAL DAILY
Qty: 30 TABLET | Refills: 2 | Status: SHIPPED | OUTPATIENT
Start: 2023-03-06

## 2023-03-06 RX ORDER — TIZANIDINE 4 MG/1
4 TABLET ORAL 3 TIMES DAILY PRN
Qty: 90 TABLET | Refills: 1 | Status: SHIPPED | OUTPATIENT
Start: 2023-03-06

## 2023-03-06 RX ORDER — FUROSEMIDE 40 MG/1
40 TABLET ORAL DAILY
Qty: 30 TABLET | Refills: 2 | Status: SHIPPED | OUTPATIENT
Start: 2023-03-06

## 2023-03-06 SDOH — ECONOMIC STABILITY: FOOD INSECURITY: WITHIN THE PAST 12 MONTHS, THE FOOD YOU BOUGHT JUST DIDN'T LAST AND YOU DIDN'T HAVE MONEY TO GET MORE.: NEVER TRUE

## 2023-03-06 SDOH — ECONOMIC STABILITY: INCOME INSECURITY: HOW HARD IS IT FOR YOU TO PAY FOR THE VERY BASICS LIKE FOOD, HOUSING, MEDICAL CARE, AND HEATING?: SOMEWHAT HARD

## 2023-03-06 SDOH — ECONOMIC STABILITY: FOOD INSECURITY: WITHIN THE PAST 12 MONTHS, YOU WORRIED THAT YOUR FOOD WOULD RUN OUT BEFORE YOU GOT MONEY TO BUY MORE.: NEVER TRUE

## 2023-03-06 SDOH — ECONOMIC STABILITY: HOUSING INSECURITY
IN THE LAST 12 MONTHS, WAS THERE A TIME WHEN YOU DID NOT HAVE A STEADY PLACE TO SLEEP OR SLEPT IN A SHELTER (INCLUDING NOW)?: NO

## 2023-03-06 ASSESSMENT — PATIENT HEALTH QUESTIONNAIRE - PHQ9
2. FEELING DOWN, DEPRESSED OR HOPELESS: 2
1. LITTLE INTEREST OR PLEASURE IN DOING THINGS: 2
9. THOUGHTS THAT YOU WOULD BE BETTER OFF DEAD, OR OF HURTING YOURSELF: 0
5. POOR APPETITE OR OVEREATING: 0
SUM OF ALL RESPONSES TO PHQ QUESTIONS 1-9: 4
SUM OF ALL RESPONSES TO PHQ QUESTIONS 1-9: 4
6. FEELING BAD ABOUT YOURSELF - OR THAT YOU ARE A FAILURE OR HAVE LET YOURSELF OR YOUR FAMILY DOWN: 0
3. TROUBLE FALLING OR STAYING ASLEEP: 0
SUM OF ALL RESPONSES TO PHQ QUESTIONS 1-9: 4
8. MOVING OR SPEAKING SO SLOWLY THAT OTHER PEOPLE COULD HAVE NOTICED. OR THE OPPOSITE, BEING SO FIGETY OR RESTLESS THAT YOU HAVE BEEN MOVING AROUND A LOT MORE THAN USUAL: 0
SUM OF ALL RESPONSES TO PHQ9 QUESTIONS 1 & 2: 4
SUM OF ALL RESPONSES TO PHQ QUESTIONS 1-9: 4
7. TROUBLE CONCENTRATING ON THINGS, SUCH AS READING THE NEWSPAPER OR WATCHING TELEVISION: 0
4. FEELING TIRED OR HAVING LITTLE ENERGY: 0
10. IF YOU CHECKED OFF ANY PROBLEMS, HOW DIFFICULT HAVE THESE PROBLEMS MADE IT FOR YOU TO DO YOUR WORK, TAKE CARE OF THINGS AT HOME, OR GET ALONG WITH OTHER PEOPLE: 0

## 2023-03-06 NOTE — Clinical Note
Pt needs the following ordered - I can't find them all in Epic. Wasn't sure if we could just verbally pass to [facility] supply or what?   Wheeled walker [address] [place] [address],[address] chair Toilet chair Transfer to [facility]

## 2023-03-06 NOTE — PROGRESS NOTES
3/6/23  Elena Sales : 1955 Sex: female  Age: 79 y.o. Assessment and Plan:  Jeremy Mcfadden was seen today for follow-up from hospital and other. Diagnoses and all orders for this visit:    Bilateral lower extremity edema  -     furosemide (LASIX) 40 MG tablet; Take 1 tablet by mouth daily  -     potassium chloride (KLOR-CON M) 20 MEQ extended release tablet; Take 1 tablet by mouth daily Only if also taking lasix  Increase lasix for now     Ambulatory dysfunction  -     External Referral To Physical Therapy  Refer to PT    Neck pain, chronic  -     External Referral To Physical Therapy    Lumbar back pain  -     External Referral To Physical Therapy    Soft tissue disorder  -     External Referral To Wound Clinic  Refer back to wound given pain     Other orders  -     tiZANidine (ZANAFLEX) 4 MG tablet; Take 1 tablet by mouth 3 times daily as needed (back spasms)  -     diclofenac sodium (VOLTAREN) 1 % GEL; Apply 2 g topically 4 times daily as needed for Pain To neck  -     diclofenac sodium (VOLTAREN) 1 % GEL; Apply 4 g topically 4 times daily as needed for Pain To lower back    Plan to repeat BMP at f/u   Will communicate mobility needs to [facility]       Return in about 2 weeks (around 3/20/2023).         Chief Complaint   Patient presents with    Follow-Up from Hospital    Other     Requesting orders to be sent to [facility]  [facility]   Pt was sent for worsening lymphedema, causing mobility issues  She was really unable to care for self at home  Hospital did keep for about 5 days, treating for cellulitis   D/c summary reviewed     D/c home last Wednesday   She is feeling comfortable at home  Has someone coming in to help her clean  She is feeling a bit isolated and lonely again though too       Swelling lower legs worse again since being home  Taking lasix daily, 20mg   Wondering if dose needs increased or what  Pt is less active again since being home  Left heel wound was being treated in [facility]  It healed up but pt having recurrent pain there now  She would like to go back to wound care at [facility] where she was being seen     She has multiple mobility requests as well:  Wheeled walker  [address] chair  Toilet chair  Transfer to the tub chair      Problem list reviewed and updated in full with patient today as necessary. A comprehensive ROS was negative, except as documented above.          Current Outpatient Medications:     furosemide (LASIX) 40 MG tablet, Take 1 tablet by mouth daily, Disp: 30 tablet, Rfl: 2    potassium chloride (KLOR-CON M) 20 MEQ extended release tablet, Take 1 tablet by mouth daily Only if also taking lasix, Disp: 30 tablet, Rfl: 2    tiZANidine (ZANAFLEX) 4 MG tablet, Take 1 tablet by mouth 3 times daily as needed (back spasms), Disp: 90 tablet, Rfl: 1    diclofenac sodium (VOLTAREN) 1 % GEL, Apply 2 g topically 4 times daily as needed for Pain To neck, Disp: 150 g, Rfl: 1    diclofenac sodium (VOLTAREN) 1 % GEL, Apply 4 g topically 4 times daily as needed for Pain To lower back, Disp: 150 g, Rfl: 1    atorvastatin (LIPITOR) 40 MG tablet, TAKE ONE TABLET BY MOUTH EVERY DAY, Disp: 90 tablet, Rfl: 1    levothyroxine (SYNTHROID) 25 MCG tablet, TAKE ONE TABLET BY MOUTH EVERY DAY, Disp: 90 tablet, Rfl: 1    meloxicam (MOBIC) 7.5 MG tablet, Take 1 tablet by mouth daily, Disp: 30 tablet, Rfl: 3    albuterol sulfate HFA (PROVENTIL;VENTOLIN;PROAIR) 108 (90 Base) MCG/ACT inhaler, INHALE ONE PUFF EVERY 4 HOURS AS NEEDED, Disp: 8.5 g, Rfl: 1    Vitamin D, Cholecalciferol, 25 MCG (1000 UT) TABS, TAKE ONE TABLET BY MOUTH EVERY DAY, Disp: 30 tablet, Rfl: 3    triamcinolone (KENALOG) 0.1 % cream, APPLY TO THE AFFECTED AREA(S) TWICE DAILY, Disp: 30 g, Rfl: 0    LORazepam (ATIVAN) 0.5 MG tablet, TAKE ONE TABLET BY MOUTH THREE TIMES DAILY, Disp: , Rfl:     ARIPiprazole (ABILIFY) 2 MG tablet, TAKE ONE TABLET BY MOUTH EVERY MORNING, Disp: , Rfl: desvenlafaxine succinate (PRISTIQ) 50 MG TB24 extended release tablet, Take 50 mg by mouth daily, Disp: , Rfl:     desvenlafaxine succinate (PRISTIQ) 100 MG TB24 extended release tablet, TAKE ONE TABLET BY MOUTH EVERY DAY, Disp: , Rfl:     buPROPion (WELLBUTRIN XL) 150 MG extended release tablet, TAKE ONE TABLET BY MOUTH EVERY MORNING, Disp: , Rfl: 1    QUEtiapine (SEROQUEL) 400 MG tablet, TAKE TWO TABLETS BY MOUTH AT BEDTIME, Disp: , Rfl: 5    acetaminophen (TYLENOL) 500 MG tablet, Take 500 mg by mouth every 6 hours as needed for Pain Indications: Four 500mg QD, Disp: , Rfl:     lamoTRIgine (LAMICTAL) 200 MG tablet, Take 200 mg by mouth daily , Disp: , Rfl:     hydrocortisone 2.5 % cream, Apply topically as needed Apply topically 2 times daily. , Disp: , Rfl:   Allergies   Allergen Reactions    Latex      With condom     Amlodipine Besylate     Diphenhydramine      Does the opposite    wired    Hydroxyzine     Sulfa Antibiotics      As a child    Vistaril [Hydroxyzine Hcl]      \"does the complete opposite\"       Pt's past medical and surgical history were reviewed and updated as necessary today   Pt's family and social history were reviewed and updated as necessary today          Vitals:    03/06/23 1257 03/06/23 1314   BP: (!) 144/58 (!) 144/58   Pulse: 100    Resp: 16    Temp: 97 °F (36.1 °C)    TempSrc: Temporal    SpO2: 99%    Weight: 286 lb (129.7 kg)    Height: 5' 5\" (1.651 m)        Physical Exam  Constitutional:       Appearance: Normal appearance. HENT:      Head: Normocephalic and atraumatic. Eyes:      Conjunctiva/sclera: Conjunctivae normal.   Cardiovascular:      Rate and Rhythm: Normal rate and regular rhythm. Heart sounds: Normal heart sounds. Pulmonary:      Effort: Pulmonary effort is normal.      Breath sounds: Normal breath sounds. Abdominal:      Palpations: Abdomen is soft. Tenderness: There is no abdominal tenderness.    Musculoskeletal:         General: Normal range of motion. Feet:    Feet:      Comments: No ilya skin breakdown but the superficial skin is scaled. Very TTP. Appears somewhat bruised. Skin:     General: Skin is warm and dry. Neurological:      General: No focal deficit present. Mental Status: She is alert and oriented to person, place, and time. Psychiatric:         Mood and Affect: Mood normal.         Behavior: Behavior normal.       Counseled patient as appropriate and relevant regarding above diagnosis, including possible risks and complications, especially if left uncontrolled. Counseled patient as appropriate and relevant regarding any  possible side effects, risks, and alternatives to treatment; patient and/or guardian verbalizes understanding, and is in agreement with the plan as detailed above. Reviewed age and gender appropriate health screening exams and vaccinations. Advised patient regarding importance of keeping up with recommended health maintenance and to schedule as soon as possible if overdue, as this is important in assessing for undiagnosed pathology, especially cancer, as well as protecting against potentially harmful/life threatening disease. If discussed, any educational materials and/or home exercises printed for patient's review and were included in patient instructions on his/her After Visit Summary and given to patient at the end of visit. Advised patient to call with any new medication issues, and and other concerns/complaints prior to scheduled follow up. All questions answered to the patient's satisfaction.         Seen By:  Jud Sloan MD

## 2023-03-07 ENCOUNTER — TELEPHONE (OUTPATIENT)
Dept: PRIMARY CARE CLINIC | Age: 68
End: 2023-03-07

## 2023-03-07 NOTE — TELEPHONE ENCOUNTER
Last Appointment:  3/6/2023  Future Appointments   Date Time Provider Sushma Russell   3/21/2023 12:30 PM Jud Sloan MD [facility]      Need note for 03/06/2023 to complete referrals.     Electronically signed by Nikki Harper LPN on 1/4/5185 at 8:01 PM

## 2023-03-08 ENCOUNTER — TELEPHONE (OUTPATIENT)
Dept: PRIMARY CARE CLINIC | Age: 68
End: 2023-03-08

## 2023-03-08 DIAGNOSIS — R26.2 AMBULATORY DYSFUNCTION: Primary | ICD-10-CM

## 2023-03-08 DIAGNOSIS — R53.1 GENERALIZED WEAKNESS: ICD-10-CM

## 2023-03-08 NOTE — TELEPHONE ENCOUNTER
----- Message from Hamida Krishnamurthy MD sent at 3/8/2023  2:06 PM EST -----  Pt needs the following ordered - I can't find them all in Epic. Wasn't sure if we could just verbally pass to [facility] or what?     Wheeled walker  [address] [facility]  Toilet chair  Transfer to [facility] chair

## 2023-03-08 NOTE — TELEPHONE ENCOUNTER
Spoke with Sherrie Tomas at [facility].   She stated that the pt's insurance will not cover anything in the bathroom. She also stated that they will only cover one ambulatory item every 5 years, so she would suggest requesting a \"wheeled walker with a seat. \"     We can tell the pt that a transfer bench for the bathtub is around $80, and canes are around $20.

## 2023-03-09 ENCOUNTER — TELEPHONE (OUTPATIENT)
Dept: PRIMARY CARE CLINIC | Age: 68
End: 2023-03-09

## 2023-03-09 NOTE — TELEPHONE ENCOUNTER
Last Appointment:  3/6/2023  Future Appointments   Date Time Provider Sushma Penni   3/21/2023 12:30 PM Isabelle Coughlin MD [address] [place] [address]      Referral to [address] [place] [address] PT. Informed by Jovana Santamaria that patient is usually a no show. They are willing to try again.     Electronically signed by Harshal Garces LPN on 4/9/0901 at 36:87 AM

## 2023-03-10 ENCOUNTER — TELEPHONE (OUTPATIENT)
Dept: PRIMARY CARE CLINIC | Age: 68
End: 2023-03-10

## 2023-03-10 NOTE — TELEPHONE ENCOUNTER
Per last telephone encounter, pt's insurance will not pay for most of the items she requested. Pt notified, and she stated that she needs to check on some things.

## 2023-03-13 ENCOUNTER — TELEPHONE (OUTPATIENT)
Dept: PRIMARY CARE CLINIC | Age: 68
End: 2023-03-13

## 2023-03-13 NOTE — TELEPHONE ENCOUNTER
Last Appointment:  3/6/2023  Future Appointments   Date Time Provider Sushma Penni   3/21/2023 12:30 PM Sara Cazares  [facility] called patient to make an appointment today. Patient reports she does not have an open wound anymore. They will not be seeing patient at this time.     Electronically signed by Estevan Miller LPN on 8/32/4169 at 4:39 PM

## 2023-03-15 ENCOUNTER — TELEPHONE (OUTPATIENT)
Dept: PRIMARY CARE CLINIC | Age: 68
End: 2023-03-15

## 2023-03-15 DIAGNOSIS — R60.0 BILATERAL LOWER EXTREMITY EDEMA: Primary | ICD-10-CM

## 2023-03-15 DIAGNOSIS — M25.561 CHRONIC PAIN OF BOTH KNEES: ICD-10-CM

## 2023-03-15 DIAGNOSIS — M25.562 CHRONIC PAIN OF BOTH KNEES: ICD-10-CM

## 2023-03-15 DIAGNOSIS — G89.29 CHRONIC PAIN OF BOTH KNEES: ICD-10-CM

## 2023-03-15 DIAGNOSIS — G89.29 CHRONIC MIDLINE LOW BACK PAIN WITHOUT SCIATICA: ICD-10-CM

## 2023-03-15 DIAGNOSIS — M54.50 CHRONIC MIDLINE LOW BACK PAIN WITHOUT SCIATICA: ICD-10-CM

## 2023-03-15 RX ORDER — MELOXICAM 7.5 MG/1
TABLET ORAL
Qty: 30 TABLET | Refills: 3 | Status: SHIPPED | OUTPATIENT
Start: 2023-03-15

## 2023-03-15 NOTE — TELEPHONE ENCOUNTER
Pt says shes taking 40mg of lasix and its not helping , her legs and feet are swollen and wants to know if you could increase dose of lasix.    [facility]

## 2023-03-15 NOTE — TELEPHONE ENCOUNTER
Pt notified and gave verbal understanding. I asked that she call back in a couple days to let us know how she is doing.

## 2023-03-17 ENCOUNTER — OFFICE VISIT (OUTPATIENT)
Dept: FAMILY MEDICINE CLINIC | Age: 68
End: 2023-03-17
Payer: COMMERCIAL

## 2023-03-17 ENCOUNTER — TELEPHONE (OUTPATIENT)
Dept: PRIMARY CARE CLINIC | Age: 68
End: 2023-03-17

## 2023-03-17 VITALS — RESPIRATION RATE: 20 BRPM | BODY MASS INDEX: 48.48 KG/M2 | TEMPERATURE: 98.2 F | HEIGHT: 65 IN | WEIGHT: 291 LBS

## 2023-03-17 DIAGNOSIS — R60.9 PERIPHERAL EDEMA: ICD-10-CM

## 2023-03-17 DIAGNOSIS — L03.116 CELLULITIS OF BOTH LOWER EXTREMITIES: ICD-10-CM

## 2023-03-17 DIAGNOSIS — L03.115 CELLULITIS OF BOTH LOWER EXTREMITIES: ICD-10-CM

## 2023-03-17 DIAGNOSIS — R60.9 PERIPHERAL EDEMA: Primary | ICD-10-CM

## 2023-03-17 LAB
ANION GAP SERPL CALCULATED.3IONS-SCNC: 14 MMOL/L (ref 7–16)
BNP BLD-MCNC: 91 PG/ML (ref 0–125)
BUN SERPL-MCNC: 22 MG/DL (ref 6–23)
CALCIUM SERPL-MCNC: 9.1 MG/DL (ref 8.6–10.2)
CHLORIDE SERPL-SCNC: 100 MMOL/L (ref 98–107)
CO2 SERPL-SCNC: 27 MMOL/L (ref 22–29)
CREAT SERPL-MCNC: 1.2 MG/DL (ref 0.5–1)
GLUCOSE SERPL-MCNC: 109 MG/DL (ref 74–99)
POTASSIUM SERPL-SCNC: 4 MMOL/L (ref 3.5–5)
SODIUM SERPL-SCNC: 141 MMOL/L (ref 132–146)

## 2023-03-17 PROCEDURE — 1123F ACP DISCUSS/DSCN MKR DOCD: CPT | Performed by: NURSE PRACTITIONER

## 2023-03-17 PROCEDURE — 99214 OFFICE O/P EST MOD 30 MIN: CPT | Performed by: NURSE PRACTITIONER

## 2023-03-17 RX ORDER — CEPHALEXIN 500 MG/1
500 CAPSULE ORAL 2 TIMES DAILY
Qty: 14 CAPSULE | Refills: 0 | Status: SHIPPED | OUTPATIENT
Start: 2023-03-17 | End: 2023-03-24

## 2023-03-17 ASSESSMENT — ENCOUNTER SYMPTOMS
SHORTNESS OF BREATH: 0
COLOR CHANGE: 0
FACIAL SWELLING: 0
CHEST TIGHTNESS: 0
DIARRHEA: 0
BACK PAIN: 0
VOMITING: 0
CONSTIPATION: 0
EYES NEGATIVE: 1
RHINORRHEA: 0
ABDOMINAL PAIN: 0
COUGH: 0
VOICE CHANGE: 0
ABDOMINAL DISTENTION: 0
NAUSEA: 0
APNEA: 0
WHEEZING: 0

## 2023-03-17 NOTE — TELEPHONE ENCOUNTER
Pt called last night to on call but did not answer when I returned her call. Message was left. I see she was seen in walk in today.

## 2023-03-17 NOTE — PROGRESS NOTES
2023     Jean Pierre aSles 76 y.o. female   : 1955  Chief Complaint:   Leg Swelling       History of Present Illness:   Jean Pierre Sales is a 76 y.o. female who presents to the office with complaints of persisitent BLE edema. Patient called PCP on 03/15/2023 and lasix was increased to 40mg BID. She has been taking this as prescribed with no improvement. Denies any fever, chills, chest pain, lightheadedness, dizziness or shortness of breath.       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 Carrol Baugh    Chronic back pain     upper, mid and lower back    Dementia ([facility] [address]     Depression     bipolar    H/O being hospitalized     for cervical, thoracic, and lumbar pain, at [facility]    H/O colonoscopy     Head injury two years ago    Headache(784.0)     8/10 severity    Hyperlipidemia     Memory difficulties     mispelling words, dialing wrong numbers    Migraine     10/10 severity, 6 per year    Numbness and tingling     both arms    OCD (obsessive compulsive disorder)        Past Surgical History:   Procedure Laterality Date    ANESTHESIA NERVE BLOCK Left 2019    LEFT TRANSFORAMINAL LUMBAR NERVE BLOCK AT L4-5 UNDER X-RAY WITH IV SEDATION performed by Jessenia Bhardwaj DO at [address] [place] [address] ([address] [place] [address])      partial    [address] [place] [address] Bilateral 2018    LUMBAR TRANSFORAMINAL L4-5 performed by Jessenia Bhardwaj DO at [address] [place] [address]  Left 2018    left sacroiliac joint injection #1 with IV sedation    NERVE BLOCK Left 2018    left sacroiliac joint injection #2    NERVE BLOCK Left 2018    NERVE BLOCK Bilateral 2018    transforaminal injection    NERVE BLOCK  2018    NERVE BLOCK Left 2019    with sedation    MI NJX DX/THER SBST INTRLMNR LMBR/SAC W/IMG GDN N/A 2018    BILATERAL LUMBAR TRANSFORAMINAL NERVE BLOCK  #2 L4-5 WITH IV SEDATION performed by Stacy Marie DO at [address] [facility] [facility] W/COLUM [address] TIP ONLY Left 2018    LEFT SACROILIAC JOINT INJECTION #1 WITH IV SEDATION performed by Stacy Marie DO at [address] [facility] [address] [facility] W/COLUM [address] TIP ONLY Left 2018    LEFT SACROILIAC JOINT INJECTION #2 WITH IV SEDATION performed by Stacy Marie DO at [facility] Left 2018    LEFT SACROILIAC JOINT RADIOFREQUENCY LATERAL SACRAL NERVES S1 S2 S3 performed by Stacy Marie DO at [address] [address]    2 neck surgeries & 1 -LB surgery       Family History   Problem Relation Age of Onset    Dementia Mother     Alzheimer's Disease Mother     Parkinsonism Father     Brain Cancer Brother         tumor    Lung Cancer Brother        Social History     Tobacco Use    Smoking status: Former     Packs/day: 1.00     Years: 10.00     Pack years: 10.00     Types: Cigarettes     Start date: 2007     Quit date: 2021     Years since quittin.2    Smokeless tobacco: Never   Vaping Use    Vaping Use: Never used   Substance Use Topics    Alcohol use: No    Drug use: No       Medications:     Current Outpatient Medications:     cephALEXin (KEFLEX) 500 MG capsule, Take 1 capsule by mouth 2 times daily for 7 days, Disp: 14 capsule, Rfl: 0    meloxicam (MOBIC) 7.5 MG tablet, TAKE ONE TABLET BY MOUTH EVERY DAY, Disp: 30 tablet, Rfl: 3    furosemide (LASIX) 40 MG tablet, Take 1 tablet by mouth daily, Disp: 30 tablet, Rfl: 2    potassium chloride (KLOR-CON M) 20 MEQ extended release tablet, Take 1 tablet by mouth daily Only if also taking lasix, Disp: 30 tablet, Rfl: 2    tiZANidine (ZANAFLEX) 4 MG tablet, Take 1 tablet by mouth 3 times daily as needed (back spasms), Disp: 90 tablet, Rfl: 1    diclofenac sodium (VOLTAREN) 1 % GEL, Apply 2 g topically 4 times daily as needed for Pain To neck, Disp: 150 g, Rfl: 1    diclofenac sodium (VOLTAREN) 1 % GEL, Apply 4 g topically 4 times daily as needed for Pain To lower back, Disp: 150 g, Rfl: 1    atorvastatin (LIPITOR) 40 MG tablet, TAKE ONE TABLET BY MOUTH EVERY DAY, Disp: 90 tablet, Rfl: 1    levothyroxine (SYNTHROID) 25 MCG tablet, TAKE ONE TABLET BY MOUTH EVERY DAY, Disp: 90 tablet, Rfl: 1    albuterol sulfate HFA (PROVENTIL;VENTOLIN;PROAIR) 108 (90 Base) MCG/ACT inhaler, INHALE ONE PUFF EVERY 4 HOURS AS NEEDED, Disp: 8.5 g, Rfl: 1    Vitamin D, Cholecalciferol, 25 MCG (1000 UT) TABS, TAKE ONE TABLET BY MOUTH EVERY DAY, Disp: 30 tablet, Rfl: 3    triamcinolone (KENALOG) 0.1 % cream, APPLY TO THE AFFECTED AREA(S) TWICE DAILY, Disp: 30 g, Rfl: 0    LORazepam (ATIVAN) 0.5 MG tablet, TAKE ONE TABLET BY MOUTH THREE TIMES DAILY, Disp: , Rfl:     ARIPiprazole (ABILIFY) 2 MG tablet, TAKE ONE TABLET BY MOUTH EVERY MORNING, Disp: , Rfl:     desvenlafaxine succinate (PRISTIQ) 50 MG TB24 extended release tablet, Take 50 mg by mouth daily, Disp: , Rfl:     desvenlafaxine succinate (PRISTIQ) 100 MG TB24 extended release tablet, TAKE ONE TABLET BY MOUTH EVERY DAY, Disp: , Rfl:     buPROPion (WELLBUTRIN XL) 150 MG extended release tablet, TAKE ONE TABLET BY MOUTH EVERY MORNING, Disp: , Rfl: 1    QUEtiapine (SEROQUEL) 400 MG tablet, TAKE TWO TABLETS BY MOUTH AT BEDTIME, Disp: , Rfl: 5    acetaminophen (TYLENOL) 500 MG tablet, Take 500 mg by mouth every 6 hours as needed for Pain Indications: Four 500mg QD, Disp: , Rfl:     lamoTRIgine (LAMICTAL) 200 MG tablet, Take 200 mg by mouth daily , Disp: , Rfl:     hydrocortisone 2.5 % cream, Apply topically as needed Apply topically 2 times daily.  , Disp: , Rfl:     Allergies   Allergen Reactions    Latex      With condom     Amlodipine Besylate     Diphenhydramine      Does the opposite    wired    Hydroxyzine     Sulfa Antibiotics      As a child    Vistaril [Hydroxyzine Hcl]      \"does the complete opposite\"       Review of Systems:   Review of Systems   Constitutional:  Negative for activity change, appetite change, fatigue, fever and unexpected weight change. HENT:  Negative for congestion, facial swelling, mouth sores, postnasal drip, rhinorrhea, sneezing, tinnitus and voice change. Eyes: Negative. Respiratory:  Negative for apnea, cough, chest tightness, shortness of breath and wheezing. Cardiovascular:  Positive for leg swelling. Negative for chest pain and palpitations. Gastrointestinal:  Negative for abdominal distention, abdominal pain, constipation, diarrhea, nausea and vomiting. Endocrine: Negative for cold intolerance and heat intolerance. Genitourinary:  Negative for difficulty urinating, dysuria, flank pain, frequency, pelvic pain and urgency. Musculoskeletal:  Negative for back pain, gait problem, joint swelling, myalgias and neck pain. Skin:  Negative for color change, pallor, rash and wound. Allergic/Immunologic: Negative for environmental allergies and food allergies. Neurological:  Negative for dizziness, tremors, seizures, syncope, facial asymmetry, speech difficulty, weakness, light-headedness, numbness and headaches. Psychiatric/Behavioral:  Negative for agitation, behavioral problems, confusion, decreased concentration, dysphoric mood, sleep disturbance and suicidal ideas. The patient is not hyperactive. Physical Exam:   Vital Signs:  Temp 98.2 °F (36.8 °C) (Temporal)   Resp 20   Ht 5' 5\" (1.651 m)   Wt 291 lb (132 kg)   BMI 48.42 kg/m²    Oxygen Saturation Interpretation: Normal.    Physical Exam  Vitals and nursing note reviewed. Constitutional:       Appearance: Normal appearance. She is normal weight. HENT:      Head: Normocephalic and atraumatic.       Right Ear: Tympanic membrane, ear canal and external ear normal.      Left Ear: Tympanic membrane, ear canal and external ear normal.      Nose: Nose normal.      Mouth/Throat:      Mouth: Mucous membranes are moist.      Pharynx: Oropharynx is clear. Eyes:      Extraocular Movements: Extraocular movements intact. Conjunctiva/sclera: Conjunctivae normal.      Pupils: Pupils are equal, round, and reactive to light. Cardiovascular:      Rate and Rhythm: Normal rate and regular rhythm. Pulses: Normal pulses. Heart sounds: Normal heart sounds. Pulmonary:      Effort: Pulmonary effort is normal.      Breath sounds: Normal breath sounds. No wheezing, rhonchi or rales. Abdominal:      General: Bowel sounds are normal. There is no distension. Palpations: Abdomen is soft. Tenderness: There is no abdominal tenderness. There is no rebound. Musculoskeletal:         General: Normal range of motion. Cervical back: Normal range of motion and neck supple. Right lower leg: 3+ Edema present. Left lower leg: 3+ Edema present. Feet:      Right foot:      Skin integrity: Erythema present. Left foot:      Skin integrity: Erythema present. Comments: Erythema present to the bilateral lower extremities. There is no lymphangitic streaking. Skin:     General: Skin is warm and dry. Capillary Refill: Capillary refill takes less than 2 seconds. Neurological:      General: No focal deficit present. Mental Status: She is alert and oriented to person, place, and time. Psychiatric:         Mood and Affect: Mood normal.         Behavior: Behavior normal.         Thought Content: Thought content normal.         Judgment: Judgment normal.         Testing: All laboratory and radiology results have been personally reviewed by myself. Labs:  No results found for this visit on 03/17/23. Imaging: All Radiology results interpreted by Radiologist unless otherwise noted. No results found. Assessment/Plan:   I personally reviewed the patient's allergies, past medical history, medications, and vitals sign.       Elena was seen today for leg swelling. Diagnoses and all orders for this visit:    Peripheral edema  -     Basic Metabolic Panel; Future  -     Brain Natriuretic Peptide; Future    Cellulitis of both lower extremities  -     cephALEXin (KEFLEX) 500 MG capsule; Take 1 capsule by mouth 2 times daily for 7 days    Patient appears to be in no acute distress. Okay for outpatient therapy at this time. Encourage patient to weigh self daily. If patient develops chest pain or shortness of breath she should present to the emergency room immediately. Elevate lower extremities as much as possible. Limit salt intake. We will obtain BMP and BNP in office today. Continue furosemide 40 mg twice daily with potassium supplementation. Close follow-up with PCP in 3 to 5 days. Call or go to ED immediately if symptoms worsen or persist.  F/U in 3 to 5 days. Sooner if necessary. Counseled regarding above diagnosis, including possible risks and complications,especially if left uncontrolled. Counseled regarding the possible side effects, risks, benefits and alternatives to treatment; patient and/or guardian verbalizes understanding. Advised patient to call with any new medication issues. All questions answered.     KEENA Parra - NP

## 2023-03-20 DIAGNOSIS — R60.0 BILATERAL LOWER EXTREMITY EDEMA: ICD-10-CM

## 2023-03-20 RX ORDER — FUROSEMIDE 40 MG/1
40 TABLET ORAL DAILY
Qty: 30 TABLET | Refills: 2 | Status: SHIPPED | OUTPATIENT
Start: 2023-03-20

## 2023-03-20 NOTE — TELEPHONE ENCOUNTER
Last Appointment:  3/6/2023  Future Appointments   Date Time Provider Sushma Russell   3/21/2023 12:30 PM Amira Mendez MD [facility]      Patient needs pended med refilled.     Electronically signed by Kota Ceja LPN on 8/12/2030 at 05:74 AM

## 2023-03-21 ENCOUNTER — OFFICE VISIT (OUTPATIENT)
Dept: PRIMARY CARE CLINIC | Age: 68
End: 2023-03-21
Payer: COMMERCIAL

## 2023-03-21 VITALS
OXYGEN SATURATION: 93 % | RESPIRATION RATE: 18 BRPM | HEIGHT: 65 IN | TEMPERATURE: 96.8 F | DIASTOLIC BLOOD PRESSURE: 72 MMHG | HEART RATE: 93 BPM | BODY MASS INDEX: 48.48 KG/M2 | WEIGHT: 291 LBS | SYSTOLIC BLOOD PRESSURE: 126 MMHG

## 2023-03-21 DIAGNOSIS — R26.2 AMBULATORY DYSFUNCTION: ICD-10-CM

## 2023-03-21 DIAGNOSIS — R60.0 BILATERAL LOWER EXTREMITY EDEMA: Primary | ICD-10-CM

## 2023-03-21 DIAGNOSIS — M17.0 ARTHRITIS OF BOTH KNEES: ICD-10-CM

## 2023-03-21 DIAGNOSIS — R29.898 BILATERAL LEG WEAKNESS: ICD-10-CM

## 2023-03-21 DIAGNOSIS — S91.302A NON HEALING LEFT HEEL WOUND: ICD-10-CM

## 2023-03-21 PROCEDURE — 3078F DIAST BP <80 MM HG: CPT | Performed by: FAMILY MEDICINE

## 2023-03-21 PROCEDURE — 3074F SYST BP LT 130 MM HG: CPT | Performed by: FAMILY MEDICINE

## 2023-03-21 PROCEDURE — 1123F ACP DISCUSS/DSCN MKR DOCD: CPT | Performed by: FAMILY MEDICINE

## 2023-03-21 PROCEDURE — 99214 OFFICE O/P EST MOD 30 MIN: CPT | Performed by: FAMILY MEDICINE

## 2023-03-21 NOTE — PATIENT INSTRUCTIONS
TAKE TWO LASIX 40mg and TWO POTASSIUM 20meq at a time, once daily  Call Thursday with update     [facility]   [phone]

## 2023-03-21 NOTE — PROGRESS NOTES
74 - 99 mg/dL 109 (H)    BUN,BUNPL      6 - 23 mg/dL 22    Creatinine      0.5 - 1.0 mg/dL 1.2 (H)    Est, Glom Filt Rate      >=60 mL/min/1.73 49    CALCIUM, SERUM, 461307      8.6 - 10.2 mg/dL 9.1    Pro-BNP      0 - 125 pg/mL  91       Problem list reviewed and updated in full with patient today as necessary. A comprehensive ROS was negative, except as documented above.        Current Outpatient Medications:     Lift Chair MISC, by Does not apply route, Disp: 1 each, Rfl: 0    furosemide (LASIX) 40 MG tablet, Take 1 tablet by mouth daily, Disp: 30 tablet, Rfl: 2    cephALEXin (KEFLEX) 500 MG capsule, Take 1 capsule by mouth 2 times daily for 7 days, Disp: 14 capsule, Rfl: 0    meloxicam (MOBIC) 7.5 MG tablet, TAKE ONE TABLET BY MOUTH EVERY DAY, Disp: 30 tablet, Rfl: 3    potassium chloride (KLOR-CON M) 20 MEQ extended release tablet, Take 1 tablet by mouth daily Only if also taking lasix, Disp: 30 tablet, Rfl: 2    tiZANidine (ZANAFLEX) 4 MG tablet, Take 1 tablet by mouth 3 times daily as needed (back spasms), Disp: 90 tablet, Rfl: 1    diclofenac sodium (VOLTAREN) 1 % GEL, Apply 2 g topically 4 times daily as needed for Pain To neck, Disp: 150 g, Rfl: 1    diclofenac sodium (VOLTAREN) 1 % GEL, Apply 4 g topically 4 times daily as needed for Pain To lower back, Disp: 150 g, Rfl: 1    atorvastatin (LIPITOR) 40 MG tablet, TAKE ONE TABLET BY MOUTH EVERY DAY, Disp: 90 tablet, Rfl: 1    levothyroxine (SYNTHROID) 25 MCG tablet, TAKE ONE TABLET BY MOUTH EVERY DAY, Disp: 90 tablet, Rfl: 1    albuterol sulfate HFA (PROVENTIL;VENTOLIN;PROAIR) 108 (90 Base) MCG/ACT inhaler, INHALE ONE PUFF EVERY 4 HOURS AS NEEDED, Disp: 8.5 g, Rfl: 1    Vitamin D, Cholecalciferol, 25 MCG (1000 UT) TABS, TAKE ONE TABLET BY MOUTH EVERY DAY, Disp: 30 tablet, Rfl: 3    triamcinolone (KENALOG) 0.1 % cream, APPLY TO THE AFFECTED AREA(S) TWICE DAILY, Disp: 30 g, Rfl: 0    LORazepam (ATIVAN) 0.5 MG tablet, TAKE ONE TABLET BY MOUTH THREE TIMES

## 2023-03-23 DIAGNOSIS — R60.0 BILATERAL LOWER EXTREMITY EDEMA: ICD-10-CM

## 2023-03-23 DIAGNOSIS — R60.0 BILATERAL LOWER EXTREMITY EDEMA: Primary | ICD-10-CM

## 2023-03-23 RX ORDER — BUMETANIDE 1 MG/1
TABLET ORAL
Qty: 15 TABLET | Refills: 0 | Status: SHIPPED | OUTPATIENT
Start: 2023-03-23

## 2023-03-23 RX ORDER — POTASSIUM CHLORIDE 20 MEQ/1
20 TABLET, EXTENDED RELEASE ORAL DAILY
Qty: 30 TABLET | Refills: 2 | Status: SHIPPED | OUTPATIENT
Start: 2023-03-23

## 2023-03-23 NOTE — TELEPHONE ENCOUNTER
Pt notified and gave verbal understanding. I did also confirm that she has been taking 2 lasix and 2 potassium once per day.

## 2023-03-23 NOTE — TELEPHONE ENCOUNTER
Pt notified, verbalized understanding. She does not think she can get a ride on Monday but is already scheduled to see you on Tuesday and asked if she could just get the lab work done then?

## 2023-03-23 NOTE — TELEPHONE ENCOUNTER
Will also send in Bumex to try- take one dose Saturday   Pt MUST come for repeat BMP Monday and can let us know then in Bumex helped  We will give further instructions after those results

## 2023-03-23 NOTE — TELEPHONE ENCOUNTER
----- Message from Alesia Harrison sent at 3/23/2023  1:25 PM EDT -----  Subject: Medication Problem    Medication: potassium chloride (KLOR-CON M) 20 MEQ extended release tablet  Dosage: 20 MEQ - once a day  Ordering Provider: Rose Simon    Question/Problem: States that her swelling has not gone down. Has appt   03/28. has been taking Lasix as well for the past few days. Please advise. Pharmacy: [address] [facility] [phone] Celi [place]   [phone]    ---------------------------------------------------------------------------  --------------  Gerhard MOLINA  [phone]; OK to leave message on voicemail  ---------------------------------------------------------------------------  --------------    SCRIPT ANSWERS  Relationship to Patient: Self

## 2023-03-24 ENCOUNTER — TELEPHONE (OUTPATIENT)
Dept: PRIMARY CARE CLINIC | Age: 68
End: 2023-03-24

## 2023-03-24 NOTE — TELEPHONE ENCOUNTER
Pt called in stated she did not know when to take her bumex. I read note and relayed her that she was to take her Bumex on Saturday and retest BMP on Monday and she stated she can not come in Monday due to another appointment. I then stated you can come in Tuesday to have blood work done. She stated she understood.

## 2023-03-28 ENCOUNTER — OFFICE VISIT (OUTPATIENT)
Dept: PRIMARY CARE CLINIC | Age: 68
End: 2023-03-28
Payer: COMMERCIAL

## 2023-03-28 VITALS
SYSTOLIC BLOOD PRESSURE: 152 MMHG | BODY MASS INDEX: 48.32 KG/M2 | DIASTOLIC BLOOD PRESSURE: 70 MMHG | HEIGHT: 65 IN | WEIGHT: 290 LBS | HEART RATE: 93 BPM | OXYGEN SATURATION: 100 % | TEMPERATURE: 97.3 F | RESPIRATION RATE: 18 BRPM

## 2023-03-28 DIAGNOSIS — R60.0 BILATERAL LOWER EXTREMITY EDEMA: ICD-10-CM

## 2023-03-28 DIAGNOSIS — R60.0 BILATERAL LOWER EXTREMITY EDEMA: Primary | ICD-10-CM

## 2023-03-28 DIAGNOSIS — R26.2 AMBULATORY DYSFUNCTION: ICD-10-CM

## 2023-03-28 LAB
ANION GAP SERPL CALCULATED.3IONS-SCNC: 14 MMOL/L (ref 7–16)
BUN SERPL-MCNC: 14 MG/DL (ref 6–23)
CALCIUM SERPL-MCNC: 9.5 MG/DL (ref 8.6–10.2)
CHLORIDE SERPL-SCNC: 98 MMOL/L (ref 98–107)
CO2 SERPL-SCNC: 27 MMOL/L (ref 22–29)
CREAT SERPL-MCNC: 0.7 MG/DL (ref 0.5–1)
GLUCOSE SERPL-MCNC: 84 MG/DL (ref 74–99)
POTASSIUM SERPL-SCNC: 4.3 MMOL/L (ref 3.5–5)
SODIUM SERPL-SCNC: 139 MMOL/L (ref 132–146)

## 2023-03-28 PROCEDURE — 3074F SYST BP LT 130 MM HG: CPT | Performed by: FAMILY MEDICINE

## 2023-03-28 PROCEDURE — 3078F DIAST BP <80 MM HG: CPT | Performed by: FAMILY MEDICINE

## 2023-03-28 PROCEDURE — 99214 OFFICE O/P EST MOD 30 MIN: CPT | Performed by: FAMILY MEDICINE

## 2023-03-28 PROCEDURE — 1123F ACP DISCUSS/DSCN MKR DOCD: CPT | Performed by: FAMILY MEDICINE

## 2023-03-28 NOTE — PROGRESS NOTES
of keeping up with recommended health maintenance and to schedule as soon as possible if overdue, as this is important in assessing for undiagnosed pathology, especially cancer, as well as protecting against potentially harmful/life threatening disease. If discussed, any educational materials and/or home exercises printed for patient's review and were included in patient instructions on his/her After Visit Summary and given to patient at the end of visit. Advised patient to call with any new medication issues, and and other concerns/complaints prior to scheduled follow up. All questions answered to the patient's satisfaction.         Seen By:  Nestor Herzog MD

## 2023-03-29 ENCOUNTER — TELEPHONE (OUTPATIENT)
Dept: PRIMARY CARE CLINIC | Age: 68
End: 2023-03-29

## 2023-03-29 NOTE — TELEPHONE ENCOUNTER
Patient is calling with the fax number to send the order for the lift chair. Office note has to say she uses a walker to get on and off of her recliner. Order needs to go to  Fax # [phone] [employer].   Also wants a script for walker tray and a 3 in 1 commode to be sent to [facility].

## 2023-04-04 ENCOUNTER — TELEPHONE (OUTPATIENT)
Dept: PRIMARY CARE CLINIC | Age: 68
End: 2023-04-04

## 2023-04-06 DIAGNOSIS — R60.0 BILATERAL LOWER EXTREMITY EDEMA: ICD-10-CM

## 2023-04-06 RX ORDER — POTASSIUM CHLORIDE 20 MEQ/1
40 TABLET, EXTENDED RELEASE ORAL DAILY
Qty: 60 TABLET | Refills: 0 | Status: SHIPPED | OUTPATIENT
Start: 2023-04-06

## 2023-04-06 RX ORDER — BUMETANIDE 1 MG/1
TABLET ORAL
Qty: 60 TABLET | Refills: 0 | Status: SHIPPED | OUTPATIENT
Start: 2023-04-06

## 2023-04-06 NOTE — TELEPHONE ENCOUNTER
----- Message from Ghazala Dolan sent at 4/6/2023 10:27 AM EDT -----  Subject: Refill Request    QUESTIONS  Name of Medication? potassium chloride (KLOR-CON M) 20 MEQ extended   release tablet  Patient-reported dosage and instructions? Take 1 tablet by mouth daily   Only if also taking lasix  How many days do you have left? 2  Preferred Pharmacy? [facility] [employer] phone number (if available)? [phone]  ---------------------------------------------------------------------------  --------------,  Name of Medication? bumetanide (BUMEX) 1 MG tablet  Patient-reported dosage and instructions? Take one tablet by mouth as   instructed  How many days do you have left? 2  Preferred Pharmacy? [facility] [employer] phone number (if available)? [phone]  ---------------------------------------------------------------------------  --------------  CALL BACK INFO  What is the best way for the office to contact you? OK to leave message on   voicemail  Preferred Call Back Phone Number? [phone]  ---------------------------------------------------------------------------  --------------  SCRIPT ANSWERS  Relationship to Patient?  Self

## 2023-04-10 ENCOUNTER — TELEPHONE (OUTPATIENT)
Dept: PRIMARY CARE CLINIC | Age: 68
End: 2023-04-10

## 2023-04-10 DIAGNOSIS — R60.0 BILATERAL LOWER EXTREMITY EDEMA: Primary | ICD-10-CM

## 2023-04-24 ENCOUNTER — OFFICE VISIT (OUTPATIENT)
Dept: FAMILY MEDICINE CLINIC | Age: 68
End: 2023-04-24
Payer: MEDICARE

## 2023-04-24 VITALS
RESPIRATION RATE: 20 BRPM | WEIGHT: 291 LBS | HEIGHT: 65 IN | HEART RATE: 100 BPM | OXYGEN SATURATION: 94 % | BODY MASS INDEX: 48.48 KG/M2 | SYSTOLIC BLOOD PRESSURE: 144 MMHG | TEMPERATURE: 97 F | DIASTOLIC BLOOD PRESSURE: 84 MMHG

## 2023-04-24 DIAGNOSIS — B35.4 TINEA CORPORIS: ICD-10-CM

## 2023-04-24 DIAGNOSIS — L30.4 INTERTRIGINOUS DERMATITIS ASSOCIATED WITH MOISTURE: Primary | ICD-10-CM

## 2023-04-24 PROCEDURE — 1123F ACP DISCUSS/DSCN MKR DOCD: CPT | Performed by: EMERGENCY MEDICINE

## 2023-04-24 PROCEDURE — 3077F SYST BP >= 140 MM HG: CPT | Performed by: EMERGENCY MEDICINE

## 2023-04-24 PROCEDURE — 3079F DIAST BP 80-89 MM HG: CPT | Performed by: EMERGENCY MEDICINE

## 2023-04-24 PROCEDURE — 99213 OFFICE O/P EST LOW 20 MIN: CPT | Performed by: EMERGENCY MEDICINE

## 2023-04-24 RX ORDER — NYSTATIN 10B UNIT
POWDER (EA) MISCELLANEOUS
Qty: 1 EACH | Refills: 2 | Status: SHIPPED | OUTPATIENT
Start: 2023-04-24

## 2023-04-24 RX ORDER — DOXYCYCLINE HYCLATE 100 MG
100 TABLET ORAL 2 TIMES DAILY
Qty: 20 TABLET | Refills: 0 | Status: SHIPPED | OUTPATIENT
Start: 2023-04-24 | End: 2023-05-04

## 2023-04-24 ASSESSMENT — ENCOUNTER SYMPTOMS
DIARRHEA: 0
EYE DISCHARGE: 0
VOMITING: 0
EYE PAIN: 0
COLOR CHANGE: 1
BACK PAIN: 0
COUGH: 0
EYE REDNESS: 0
ABDOMINAL DISTENTION: 0
WHEEZING: 0
SINUS PRESSURE: 0
SORE THROAT: 0
SHORTNESS OF BREATH: 0
NAUSEA: 0

## 2023-04-24 NOTE — PROGRESS NOTES
corporis  -     nystatin (MYCOSTATIN) POWD powder; Apply powder to affected skin twice daily after soap and water cleansing         Discussed symptomatic treatments with the patient today. The patient is to schedule a follow-up with PCP in the next 2-3 days for reevaluation. Red flag symptoms were also discussed with the patient today. If symptoms worsen the patient is to go directly to the emergency department for reevaluation and treatment. Pt verbalizes understanding and is in agreement with plan of care. All questions answered.       New Medications     New Prescriptions    DOXYCYCLINE HYCLATE (VIBRA-TABS) 100 MG TABLET    Take 1 tablet by mouth 2 times daily for 10 days    NYSTATIN (MYCOSTATIN) POWD POWDER    Apply powder to affected skin twice daily after soap and water cleansing       Electronically signed by Silvano Dale DO   DD: 4/24/23

## 2023-05-01 RX ORDER — TIZANIDINE 4 MG/1
TABLET ORAL
Qty: 90 TABLET | Refills: 1 | Status: SHIPPED | OUTPATIENT
Start: 2023-05-01

## 2023-05-09 RX ORDER — BUMETANIDE 1 MG/1
TABLET ORAL
Qty: 60 TABLET | Refills: 0 | Status: SHIPPED | OUTPATIENT
Start: 2023-05-09

## 2023-05-09 NOTE — TELEPHONE ENCOUNTER
Last Appointment:  3/28/2023  Future Appointments   Date Time Provider Sushma Russell   5/22/2023  9:00 AM Hans Don MD [facility]

## 2023-05-22 ENCOUNTER — OFFICE VISIT (OUTPATIENT)
Dept: PRIMARY CARE CLINIC | Age: 68
End: 2023-05-22
Payer: MEDICARE

## 2023-05-22 VITALS
DIASTOLIC BLOOD PRESSURE: 54 MMHG | RESPIRATION RATE: 18 BRPM | TEMPERATURE: 96.8 F | SYSTOLIC BLOOD PRESSURE: 112 MMHG | WEIGHT: 274 LBS | OXYGEN SATURATION: 98 % | HEART RATE: 99 BPM | HEIGHT: 65 IN | BODY MASS INDEX: 45.65 KG/M2

## 2023-05-22 DIAGNOSIS — R73.09 ELEVATED GLUCOSE: ICD-10-CM

## 2023-05-22 DIAGNOSIS — B37.9 CANDIDIASIS: Primary | ICD-10-CM

## 2023-05-22 DIAGNOSIS — E66.01 MORBID OBESITY WITH BMI OF 45.0-49.9, ADULT (HCC): ICD-10-CM

## 2023-05-22 DIAGNOSIS — R06.02 SOBOE (SHORTNESS OF BREATH ON EXERTION): ICD-10-CM

## 2023-05-22 DIAGNOSIS — F31.78 BIPOLAR DISORDER, IN FULL REMISSION, MOST RECENT EPISODE MIXED (HCC): ICD-10-CM

## 2023-05-22 DIAGNOSIS — Z78.0 POST-MENOPAUSAL: ICD-10-CM

## 2023-05-22 DIAGNOSIS — R39.89 URINE DISCOLORATION: ICD-10-CM

## 2023-05-22 DIAGNOSIS — Z72.0 TOBACCO USE: ICD-10-CM

## 2023-05-22 DIAGNOSIS — L03.311 ABDOMINAL WALL CELLULITIS: ICD-10-CM

## 2023-05-22 DIAGNOSIS — Z12.31 ENCOUNTER FOR SCREENING MAMMOGRAM FOR MALIGNANT NEOPLASM OF BREAST: ICD-10-CM

## 2023-05-22 PROBLEM — J44.9 CHRONIC OBSTRUCTIVE PULMONARY DISEASE (HCC): Status: RESOLVED | Noted: 2021-02-17 | Resolved: 2023-05-22

## 2023-05-22 PROBLEM — M46.1 SACROILIITIS (HCC): Chronic | Status: RESOLVED | Noted: 2018-04-24 | Resolved: 2023-05-22

## 2023-05-22 LAB
ALBUMIN SERPL-MCNC: 4.2 G/DL (ref 3.5–5.2)
ALP SERPL-CCNC: 97 U/L (ref 35–104)
ALT SERPL-CCNC: 18 U/L (ref 0–32)
ANION GAP SERPL CALCULATED.3IONS-SCNC: 17 MMOL/L (ref 7–16)
AST SERPL-CCNC: 25 U/L (ref 0–31)
BACTERIA URNS QL MICRO: ABNORMAL /HPF
BASOPHILS # BLD: 0.05 E9/L (ref 0–0.2)
BASOPHILS NFR BLD: 0.5 % (ref 0–2)
BILIRUB DIRECT SERPL-MCNC: <0.2 MG/DL (ref 0–0.3)
BILIRUB INDIRECT SERPL-MCNC: NORMAL MG/DL (ref 0–1)
BILIRUB SERPL-MCNC: 0.2 MG/DL (ref 0–1.2)
BILIRUB UR QL STRIP: ABNORMAL
BUN SERPL-MCNC: 28 MG/DL (ref 6–23)
CALCIUM SERPL-MCNC: 9.9 MG/DL (ref 8.6–10.2)
CHLORIDE SERPL-SCNC: 100 MMOL/L (ref 98–107)
CHOLESTEROL, TOTAL: 155 MG/DL (ref 0–199)
CLARITY UR: ABNORMAL
CO2 SERPL-SCNC: 25 MMOL/L (ref 22–29)
COLOR UR: YELLOW
CREAT SERPL-MCNC: 1.1 MG/DL (ref 0.5–1)
CRYSTALS URNS MICRO: ABNORMAL /HPF
EOSINOPHIL # BLD: 0.21 E9/L (ref 0.05–0.5)
EOSINOPHIL NFR BLD: 2.3 % (ref 0–6)
EPI CELLS #/AREA URNS HPF: ABNORMAL /HPF
ERYTHROCYTE [DISTWIDTH] IN BLOOD BY AUTOMATED COUNT: 14.5 FL (ref 11.5–15)
GLUCOSE SERPL-MCNC: 122 MG/DL (ref 74–99)
GLUCOSE UR STRIP-MCNC: NEGATIVE MG/DL
HBA1C MFR BLD: 5.3 % (ref 4–5.6)
HCT VFR BLD AUTO: 47.3 % (ref 34–48)
HDLC SERPL-MCNC: 49 MG/DL
HGB BLD-MCNC: 15.1 G/DL (ref 11.5–15.5)
HGB UR QL STRIP: NEGATIVE
IMM GRANULOCYTES # BLD: 0.05 E9/L
IMM GRANULOCYTES NFR BLD: 0.5 % (ref 0–5)
KETONES UR STRIP-MCNC: 15 MG/DL
LDLC SERPL CALC-MCNC: 68 MG/DL (ref 0–99)
LEUKOCYTE ESTERASE UR QL STRIP: NEGATIVE
LYMPHOCYTES # BLD: 1.98 E9/L (ref 1.5–4)
LYMPHOCYTES NFR BLD: 21.6 % (ref 20–42)
MCH RBC QN AUTO: 31.8 PG (ref 26–35)
MCHC RBC AUTO-ENTMCNC: 31.9 % (ref 32–34.5)
MCV RBC AUTO: 99.6 FL (ref 80–99.9)
MONOCYTES # BLD: 0.59 E9/L (ref 0.1–0.95)
MONOCYTES NFR BLD: 6.4 % (ref 2–12)
NEUTROPHILS # BLD: 6.3 E9/L (ref 1.8–7.3)
NEUTS SEG NFR BLD: 68.7 % (ref 43–80)
NITRITE UR QL STRIP: NEGATIVE
PH UR STRIP: 5.5 [PH] (ref 5–9)
PLATELET # BLD AUTO: 291 E9/L (ref 130–450)
PMV BLD AUTO: 10.9 FL (ref 7–12)
POTASSIUM SERPL-SCNC: 4.2 MMOL/L (ref 3.5–5)
PROT SERPL-MCNC: 7.1 G/DL (ref 6.4–8.3)
PROT UR STRIP-MCNC: ABNORMAL MG/DL
RBC # BLD AUTO: 4.75 E12/L (ref 3.5–5.5)
RBC #/AREA URNS HPF: ABNORMAL /HPF (ref 0–2)
SODIUM SERPL-SCNC: 142 MMOL/L (ref 132–146)
SP GR UR STRIP: 1.02 (ref 1–1.03)
T4 FREE SERPL-MCNC: 1.23 NG/DL (ref 0.93–1.7)
TRIGL SERPL-MCNC: 189 MG/DL (ref 0–149)
TSH SERPL-MCNC: 2.25 UIU/ML (ref 0.27–4.2)
UROBILINOGEN UR STRIP-ACNC: 0.2 E.U./DL
VIT B12 SERPL-MCNC: 493 PG/ML (ref 211–946)
VITAMIN D 25-HYDROXY: 32 NG/ML (ref 30–100)
VLDLC SERPL CALC-MCNC: 38 MG/DL
WBC # BLD: 9.2 E9/L (ref 4.5–11.5)
WBC #/AREA URNS HPF: ABNORMAL /HPF (ref 0–5)

## 2023-05-22 PROCEDURE — 3078F DIAST BP <80 MM HG: CPT | Performed by: FAMILY MEDICINE

## 2023-05-22 PROCEDURE — 3074F SYST BP LT 130 MM HG: CPT | Performed by: FAMILY MEDICINE

## 2023-05-22 PROCEDURE — G8400 PT W/DXA NO RESULTS DOC: HCPCS | Performed by: FAMILY MEDICINE

## 2023-05-22 PROCEDURE — 1123F ACP DISCUSS/DSCN MKR DOCD: CPT | Performed by: FAMILY MEDICINE

## 2023-05-22 PROCEDURE — 4004F PT TOBACCO SCREEN RCVD TLK: CPT | Performed by: FAMILY MEDICINE

## 2023-05-22 PROCEDURE — 1090F PRES/ABSN URINE INCON ASSESS: CPT | Performed by: FAMILY MEDICINE

## 2023-05-22 PROCEDURE — 3017F COLORECTAL CA SCREEN DOC REV: CPT | Performed by: FAMILY MEDICINE

## 2023-05-22 PROCEDURE — 99214 OFFICE O/P EST MOD 30 MIN: CPT | Performed by: FAMILY MEDICINE

## 2023-05-22 PROCEDURE — G8417 CALC BMI ABV UP PARAM F/U: HCPCS | Performed by: FAMILY MEDICINE

## 2023-05-22 PROCEDURE — G8427 DOCREV CUR MEDS BY ELIG CLIN: HCPCS | Performed by: FAMILY MEDICINE

## 2023-05-22 RX ORDER — NYSTATIN 100000 [USP'U]/G
POWDER TOPICAL
COMMUNITY
Start: 2023-05-18

## 2023-05-22 RX ORDER — DOXYCYCLINE HYCLATE 100 MG
100 TABLET ORAL 2 TIMES DAILY
Qty: 14 TABLET | Refills: 0 | Status: SHIPPED | OUTPATIENT
Start: 2023-05-22 | End: 2023-05-29

## 2023-05-22 RX ORDER — NYSTATIN 100000 U/G
CREAM TOPICAL
Qty: 30 G | Refills: 1 | Status: SHIPPED | OUTPATIENT
Start: 2023-05-22

## 2023-05-22 NOTE — PROGRESS NOTES
23  Elena Sales : 1955 Sex: female  Age: 76 y.o. Assessment and Plan:  Cyn Weir was seen today for swelling, rash, blood work and orders. Diagnoses and all orders for this visit:    Encounter for screening mammogram for malignant neoplasm of breast  -     PARESH DIGITAL SCREEN BILATERAL PER PROTOCOL; Future    Bipolar disorder, in full remission, most recent episode mixed (HCC)  -     TSH; Future  -     T4, Free; Future  -     Vitamin B12; Future    Candidiasis  -     nystatin (MYCOSTATIN) 354487 UNIT/GM cream; Apply topically 2 times daily. Abdominal wall cellulitis  -     doxycycline hyclate (VIBRA-TABS) 100 MG tablet; Take 1 tablet by mouth 2 times daily for 7 days    Tobacco use  -     XR CHEST (2 VW); Future    SOBOE (shortness of breath on exertion)  -     XR CHEST (2 VW); Future  -     Full PFT Study With Bronchodilator; Future    Post-menopausal  -     Vitamin D 25 Hydroxy; Future    Urine discoloration  -     Urinalysis; Future  -     Culture, Urine; Future    Morbid obesity with BMI of 45.0-49.9, adult ([facility] [address]  -     Basic Metabolic Panel; Future  -     CBC with Auto Differential; Future  -     Lipid Panel; Future  -     Hepatic Function Panel; Future  -     TSH; Future  -     Vitamin D 25 Hydroxy; Future  -     T4, Free; Future  -     Hemoglobin A1C; Future  -     Vitamin B12; Future    Elevated glucose  -     Hemoglobin A1C; Future        Return in about 6 weeks (around 7/3/2023).         Chief Complaint   Patient presents with    Swelling    Rash    Blood Work    Orders     Mammogram, chest xray         HPI  Pt here for routine f/u    She is c/o rash under her roll   Red and somewhat irritated   Pt had upcoming appt with derm end of July - Dr. Haider Zapata       Swelling is better  Pt states this is from sataing in bed for 4 days  Her PT told her not to do that though  She states PT is going well but she's not as good as she was when she got out of [facility] with 
cleansing, Disp: 1 each, Rfl: 2    potassium chloride (KLOR-CON M) 20 MEQ extended release tablet, Take 2 tablets by mouth daily As directed, Disp: 60 tablet, Rfl: 0    Lift Chair MISC, by Does not apply route, Disp: 1 each, Rfl: 0    furosemide (LASIX) 40 MG tablet, Take 1 tablet by mouth daily, Disp: 30 tablet, Rfl: 2    meloxicam (MOBIC) 7.5 MG tablet, TAKE ONE TABLET BY MOUTH EVERY DAY, Disp: 30 tablet, Rfl: 3    diclofenac sodium (VOLTAREN) 1 % GEL, Apply 2 g topically 4 times daily as needed for Pain To neck, Disp: 150 g, Rfl: 1    diclofenac sodium (VOLTAREN) 1 % GEL, Apply 4 g topically 4 times daily as needed for Pain To lower back, Disp: 150 g, Rfl: 1    atorvastatin (LIPITOR) 40 MG tablet, TAKE ONE TABLET BY MOUTH EVERY DAY, Disp: 90 tablet, Rfl: 1    levothyroxine (SYNTHROID) 25 MCG tablet, TAKE ONE TABLET BY MOUTH EVERY DAY, Disp: 90 tablet, Rfl: 1    albuterol sulfate HFA (PROVENTIL;VENTOLIN;PROAIR) 108 (90 Base) MCG/ACT inhaler, INHALE ONE PUFF EVERY 4 HOURS AS NEEDED, Disp: 8.5 g, Rfl: 1    Vitamin D, Cholecalciferol, 25 MCG (1000 UT) TABS, TAKE ONE TABLET BY MOUTH EVERY DAY, Disp: 30 tablet, Rfl: 3    triamcinolone (KENALOG) 0.1 % cream, APPLY TO THE AFFECTED AREA(S) TWICE DAILY, Disp: 30 g, Rfl: 0    LORazepam (ATIVAN) 0.5 MG tablet, TAKE ONE TABLET BY MOUTH THREE TIMES DAILY, Disp: , Rfl:     ARIPiprazole (ABILIFY) 2 MG tablet, TAKE ONE TABLET BY MOUTH EVERY MORNING, Disp: , Rfl:     desvenlafaxine succinate (PRISTIQ) 50 MG TB24 extended release tablet, Take 1 tablet by mouth daily, Disp: , Rfl:     desvenlafaxine succinate (PRISTIQ) 100 MG TB24 extended release tablet, TAKE ONE TABLET BY MOUTH EVERY DAY, Disp: , Rfl:     buPROPion (WELLBUTRIN XL) 150 MG extended release tablet, TAKE ONE TABLET BY MOUTH EVERY MORNING, Disp: , Rfl: 1    QUEtiapine (SEROQUEL) 400 MG tablet, TAKE TWO TABLETS BY MOUTH AT BEDTIME, Disp: , Rfl: 5    acetaminophen (TYLENOL) 500 MG tablet, Take 1 tablet by mouth every 6

## 2023-05-24 LAB — BACTERIA UR CULT: NORMAL

## 2023-06-06 DIAGNOSIS — B37.9 CANDIDIASIS: ICD-10-CM

## 2023-06-06 RX ORDER — NYSTATIN 100000 U/G
CREAM TOPICAL
Qty: 30 G | Refills: 1 | Status: SHIPPED | OUTPATIENT
Start: 2023-06-06

## 2023-06-20 DIAGNOSIS — B37.9 CANDIDIASIS: ICD-10-CM

## 2023-06-20 RX ORDER — NYSTATIN 100000 U/G
CREAM TOPICAL
Qty: 30 G | Refills: 1 | OUTPATIENT
Start: 2023-06-20

## 2023-06-22 RX ORDER — TIZANIDINE 4 MG/1
TABLET ORAL
Qty: 90 TABLET | Refills: 1 | Status: SHIPPED | OUTPATIENT
Start: 2023-06-22

## 2023-06-28 DIAGNOSIS — Z12.31 ENCOUNTER FOR SCREENING MAMMOGRAM FOR MALIGNANT NEOPLASM OF BREAST: ICD-10-CM

## 2023-06-28 LAB
DLCO %PRED: NORMAL
DLCO PRED: NORMAL
DLCO/VA %PRED: NORMAL
DLCO/VA PRED: NORMAL
DLCO/VA: NORMAL
DLCO: NORMAL
EXPIRATORY TIME-POST: NORMAL
EXPIRATORY TIME: NORMAL
FEF 25-75% %CHNG: NORMAL
FEF 25-75% %PRED-POST: NORMAL
FEF 25-75% %PRED-PRE: NORMAL
FEF 25-75% PRED: NORMAL
FEF 25-75%-POST: NORMAL
FEF 25-75%-PRE: NORMAL
FEV1 %PRED-POST: NORMAL
FEV1 %PRED-PRE: NORMAL
FEV1 PRED: NORMAL
FEV1-POST: NORMAL
FEV1-PRE: NORMAL
FEV1/FVC %PRED-POST: NORMAL
FEV1/FVC %PRED-PRE: NORMAL
FEV1/FVC PRED: NORMAL
FEV1/FVC-POST: NORMAL
FEV1/FVC-PRE: NORMAL
FVC %PRED-POST: NORMAL
FVC %PRED-PRE: NORMAL
FVC PRED: NORMAL
FVC-POST: NORMAL
FVC-PRE: NORMAL
GAW %PRED: NORMAL
GAW PRED: NORMAL
GAW: NORMAL
IC %PRED: NORMAL
IC PRED: NORMAL
IC: NORMAL
MEP: NORMAL
MIP: NORMAL
MVV %PRED-PRE: NORMAL
MVV PRED: NORMAL
MVV-PRE: NORMAL
PEF %PRED-POST: NORMAL
PEF %PRED-PRE: NORMAL
PEF PRED: NORMAL
PEF%CHNG: NORMAL
PEF-POST: NORMAL
PEF-PRE: NORMAL
RAW %PRED: NORMAL
RAW PRED: NORMAL
RAW: NORMAL
RV %PRED: NORMAL
RV PRED: NORMAL
RV: NORMAL
SVC %PRED: NORMAL
SVC PRED: NORMAL
SVC: NORMAL
TLC %PRED: NORMAL
TLC PRED: NORMAL
TLC: NORMAL
VA %PRED: NORMAL
VA PRED: NORMAL
VA: NORMAL
VTG %PRED: NORMAL
VTG PRED: NORMAL
VTG: NORMAL

## 2023-07-03 DIAGNOSIS — G89.29 CHRONIC MIDLINE LOW BACK PAIN WITHOUT SCIATICA: ICD-10-CM

## 2023-07-03 DIAGNOSIS — M25.561 CHRONIC PAIN OF BOTH KNEES: ICD-10-CM

## 2023-07-03 DIAGNOSIS — G89.29 CHRONIC PAIN OF BOTH KNEES: ICD-10-CM

## 2023-07-03 DIAGNOSIS — M25.562 CHRONIC PAIN OF BOTH KNEES: ICD-10-CM

## 2023-07-03 DIAGNOSIS — M54.50 CHRONIC MIDLINE LOW BACK PAIN WITHOUT SCIATICA: ICD-10-CM

## 2023-07-03 RX ORDER — MELOXICAM 7.5 MG/1
TABLET ORAL
Qty: 30 TABLET | Refills: 3 | Status: SHIPPED | OUTPATIENT
Start: 2023-07-03

## 2023-07-03 NOTE — TELEPHONE ENCOUNTER
Last Appointment:  5/22/2023  Future Appointments   Date Time Provider [address]   8/28/2023 11:00 AM Bryce Klein MD [facility]   8/28/2023 12:15 PM Bryce Klein MD [facility]

## 2023-07-10 DIAGNOSIS — R60.0 BILATERAL LOWER EXTREMITY EDEMA: ICD-10-CM

## 2023-07-10 RX ORDER — POTASSIUM CHLORIDE 20 MEQ/1
40 TABLET, EXTENDED RELEASE ORAL DAILY
Qty: 60 TABLET | Refills: 5 | Status: SHIPPED | OUTPATIENT
Start: 2023-07-10

## 2023-07-10 NOTE — TELEPHONE ENCOUNTER
Last Appointment:  5/22/2023  Future Appointments   Date Time Provider [address]   8/28/2023 11:00 AM Margy Cameron MD [facility]   8/28/2023 12:15 PM Margy Cameron MD [facility]

## 2023-07-11 DIAGNOSIS — R06.02 SOBOE (SHORTNESS OF BREATH ON EXERTION): ICD-10-CM

## 2023-07-13 DIAGNOSIS — R06.02 SOB (SHORTNESS OF BREATH): Primary | ICD-10-CM

## 2023-07-13 RX ORDER — ALBUTEROL SULFATE 90 UG/1
2 AEROSOL, METERED RESPIRATORY (INHALATION) 4 TIMES DAILY PRN
Qty: 18 G | Refills: 5 | Status: SHIPPED | OUTPATIENT
Start: 2023-07-13

## 2023-07-25 DIAGNOSIS — E78.2 MIXED HYPERLIPIDEMIA: ICD-10-CM

## 2023-07-25 RX ORDER — ATORVASTATIN CALCIUM 40 MG/1
TABLET, FILM COATED ORAL
Qty: 90 TABLET | Refills: 1 | Status: SHIPPED | OUTPATIENT
Start: 2023-07-25

## 2023-08-15 RX ORDER — TIZANIDINE 4 MG/1
TABLET ORAL
Qty: 90 TABLET | Refills: 1 | Status: SHIPPED | OUTPATIENT
Start: 2023-08-15

## 2023-08-23 DIAGNOSIS — R60.0 BILATERAL LOWER EXTREMITY EDEMA: ICD-10-CM

## 2023-08-23 RX ORDER — FUROSEMIDE 40 MG/1
TABLET ORAL
Qty: 30 TABLET | Refills: 2 | Status: SHIPPED | OUTPATIENT
Start: 2023-08-23

## 2023-08-28 ENCOUNTER — OFFICE VISIT (OUTPATIENT)
Dept: PRIMARY CARE CLINIC | Age: 68
End: 2023-08-28
Payer: MEDICARE

## 2023-08-28 VITALS
HEIGHT: 65 IN | BODY MASS INDEX: 44.82 KG/M2 | HEART RATE: 99 BPM | DIASTOLIC BLOOD PRESSURE: 70 MMHG | WEIGHT: 269 LBS | SYSTOLIC BLOOD PRESSURE: 120 MMHG | TEMPERATURE: 97.5 F | OXYGEN SATURATION: 93 %

## 2023-08-28 VITALS
OXYGEN SATURATION: 93 % | WEIGHT: 269 LBS | DIASTOLIC BLOOD PRESSURE: 70 MMHG | TEMPERATURE: 97.5 F | BODY MASS INDEX: 44.82 KG/M2 | HEIGHT: 65 IN | HEART RATE: 99 BPM | SYSTOLIC BLOOD PRESSURE: 120 MMHG

## 2023-08-28 DIAGNOSIS — Z00.00 MEDICARE ANNUAL WELLNESS VISIT, SUBSEQUENT: Primary | ICD-10-CM

## 2023-08-28 DIAGNOSIS — E78.2 MIXED HYPERLIPIDEMIA: ICD-10-CM

## 2023-08-28 DIAGNOSIS — Z72.0 TOBACCO ABUSE: ICD-10-CM

## 2023-08-28 DIAGNOSIS — R06.02 SOB (SHORTNESS OF BREATH): ICD-10-CM

## 2023-08-28 DIAGNOSIS — R60.0 BILATERAL LOWER EXTREMITY EDEMA: Primary | ICD-10-CM

## 2023-08-28 DIAGNOSIS — F31.78 BIPOLAR DISORDER, IN FULL REMISSION, MOST RECENT EPISODE MIXED (HCC): ICD-10-CM

## 2023-08-28 DIAGNOSIS — R79.89 ELEVATED SERUM CREATININE: ICD-10-CM

## 2023-08-28 DIAGNOSIS — R60.0 BILATERAL LOWER EXTREMITY EDEMA: ICD-10-CM

## 2023-08-28 LAB
ANION GAP SERPL CALCULATED.3IONS-SCNC: 14 MMOL/L (ref 7–16)
BUN BLDV-MCNC: 13 MG/DL (ref 6–23)
CALCIUM SERPL-MCNC: 9.4 MG/DL (ref 8.6–10.2)
CHLORIDE BLD-SCNC: 99 MMOL/L (ref 98–107)
CO2: 24 MMOL/L (ref 22–29)
CREAT SERPL-MCNC: 0.6 MG/DL (ref 0.5–1)
GFR SERPL CREATININE-BSD FRML MDRD: >60 ML/MIN/1.73M2
GLUCOSE BLD-MCNC: 90 MG/DL (ref 74–99)
POTASSIUM SERPL-SCNC: 4.4 MMOL/L (ref 3.5–5)
SODIUM BLD-SCNC: 137 MMOL/L (ref 132–146)

## 2023-08-28 PROCEDURE — G0439 PPPS, SUBSEQ VISIT: HCPCS | Performed by: FAMILY MEDICINE

## 2023-08-28 PROCEDURE — 1123F ACP DISCUSS/DSCN MKR DOCD: CPT | Performed by: FAMILY MEDICINE

## 2023-08-28 PROCEDURE — 3017F COLORECTAL CA SCREEN DOC REV: CPT | Performed by: FAMILY MEDICINE

## 2023-08-28 PROCEDURE — 99214 OFFICE O/P EST MOD 30 MIN: CPT | Performed by: FAMILY MEDICINE

## 2023-08-28 PROCEDURE — 4004F PT TOBACCO SCREEN RCVD TLK: CPT | Performed by: FAMILY MEDICINE

## 2023-08-28 PROCEDURE — 3074F SYST BP LT 130 MM HG: CPT | Performed by: FAMILY MEDICINE

## 2023-08-28 PROCEDURE — G8417 CALC BMI ABV UP PARAM F/U: HCPCS | Performed by: FAMILY MEDICINE

## 2023-08-28 PROCEDURE — 3078F DIAST BP <80 MM HG: CPT | Performed by: FAMILY MEDICINE

## 2023-08-28 PROCEDURE — G8400 PT W/DXA NO RESULTS DOC: HCPCS | Performed by: FAMILY MEDICINE

## 2023-08-28 PROCEDURE — 1090F PRES/ABSN URINE INCON ASSESS: CPT | Performed by: FAMILY MEDICINE

## 2023-08-28 PROCEDURE — G8427 DOCREV CUR MEDS BY ELIG CLIN: HCPCS | Performed by: FAMILY MEDICINE

## 2023-08-28 ASSESSMENT — LIFESTYLE VARIABLES
HOW MANY STANDARD DRINKS CONTAINING ALCOHOL DO YOU HAVE ON A TYPICAL DAY: 1 OR 2
HOW OFTEN DO YOU HAVE A DRINK CONTAINING ALCOHOL: MONTHLY OR LESS

## 2023-08-28 ASSESSMENT — PATIENT HEALTH QUESTIONNAIRE - PHQ9
SUM OF ALL RESPONSES TO PHQ QUESTIONS 1-9: 0
SUM OF ALL RESPONSES TO PHQ QUESTIONS 1-9: 0
2. FEELING DOWN, DEPRESSED OR HOPELESS: 0
1. LITTLE INTEREST OR PLEASURE IN DOING THINGS: 0
SUM OF ALL RESPONSES TO PHQ QUESTIONS 1-9: 0
SUM OF ALL RESPONSES TO PHQ9 QUESTIONS 1 & 2: 0
SUM OF ALL RESPONSES TO PHQ QUESTIONS 1-9: 0

## 2023-08-28 NOTE — PATIENT INSTRUCTIONS
pedometer . Order or download the FREE \"Exercise & Physical Activity: Your Everyday Guide\" from [employer]. Call 9-634.920.2237 or search [employer] online. You need 0882-6916 mg of calcium and 2746-9703 IU of vitamin D per day. It is possible to meet your calcium requirement with diet alone, but a vitamin D supplement is usually necessary to meet this goal.  When exposed to the sun, use a sunscreen that protects against both UVA and UVB radiation with an SPF of 30 or greater. Reapply every 2 to 3 hours or after sweating, drying off with a towel, or swimming. Always wear a seat belt when traveling in a car. Always wear a helmet when riding a bicycle or motorcycle.

## 2023-08-28 NOTE — PROGRESS NOTES
23  Kanajody Sales : 1955 Sex: female  Age: 76 y.o. Assessment and Plan:  Geronimo Sahni was seen today for follow-up. Diagnoses and all orders for this visit:    Bilateral lower extremity edema  -     Basic Metabolic Panel; Future  Stable. Cont current treatment as documented below. Elevated serum creatinine  -     Basic Metabolic Panel; Future  Recheck labs. If still elevated, will need to cut back on diuretics. Mixed hyperlipidemia  Stable. Cont current treatment as documented below. SOB (shortness of breath)  Stable, overall improved. Cont current treatment as documented below. Bipolar disorder, in full remission, most recent episode mixed ([address])  Stable. Cont current treatment as documented below. Tobacco abuse  Complete cessation encouraged       Return in about 4 months (around 2023). Chief Complaint   Patient presents with    Follow-up       HPI  Pt here for routine f/u    Pt's cat escaped since her last visit and did not come back  She assumes a coyote got him   Was very upset for weeks   She now has a new cat - Squeaks   Also getting another cat     Physically feeling ok  Leg swelling hasn't been too bad  Still on both lasix, bumex  Planning to make f/u massage appt     HLD -   Atorvastatin    Mood/bipolar has been stable overall  Follows with Dr. Errol Murphy her breathing has been \"ok\"  PFTs showed normal spirometry readings. Patient did have a bronchodilator month that may be indicative of early obstructive lung disease. She also had a moderate reduction in DLCO continue possible parenchymal or vascular impairment.   Has albuterol to use as needed, which does help   She is working on smoking cessation - quit again yesterday   Using nicorette gum     Labs reviewed in full with patient:  Abnormal creatinine noted   Component      Latest Ref Rng & Units 2023           9:56 AM  9:56 AM  9:56 AM  9:56 AM   WBC

## 2023-08-29 ENCOUNTER — CLINICAL DOCUMENTATION (OUTPATIENT)
Dept: SPIRITUAL SERVICES | Age: 68
End: 2023-08-29

## 2023-08-29 NOTE — ACP (ADVANCE CARE PLANNING)
Advance Care Planning   Ambulatory ACP Specialist Patient Outreach    Date:  8/29/2023    ACP Specialist:  Rev Melania Donnelly    Outreach call to patient in follow-up to ACP Specialist referral from:Carlotta Jain MD    [x] PCP  [] Provider   [] Ambulatory Care Management [] Other     For:                  [x] Advance Directive Assistance              [] Complete Portable DNR order              [] Complete POST/POLST/MOST              [] Code Status Discussion             [] Discuss Goals of Care             [] Early ACP Decision-Making              [] Other (Specify)    Date Referral Received: 8/28    Next Step:   [] ACP scheduled conversation  [x] Outreach again in one week               [x] Email / Mail [address]  [x] Email / Mail Advance Directive   [] Closing referral.  Routing closure to referring provider/staff and to ACP Specialist . [] Closure letter mailed to patient with invitation to contact ACP Specialist if / when ready. [] Other (Specify here):         [] At this time, Healthcare Decision Maker Is:        [] Primary agent named in scanned advance directive. [] Legal Next of Kin. [x] Unable to determine legal decision maker at this time. Outreaches:       [x] 1st -  Date:  8/29               Intervention:  [] Spoke with Patient   [x] Left Voice mail [x] Email / Mail    [] [employer]  [] Other [id number]) : Outcomes: Left VM for Patient. Mailed ACP documents. Call again in 2 weeks. [] 2nd -  Date:                 Intervention:  [] Spoke with Patient  [] Left Voice mail [] Email / Mail    [] [employer]  [] Other [id number]) : Outcomes:                [] 3rd -  Date:                Intervention:  [] Spoke with Patient   [] Left Voice mail [] Email / Mail    [] [employer]  [] Other [id number]) : Outcomes:           []  Additional Outreach -  Date:     (Specify Dates & special circumstances): Outcomes:          Thank you for your referral and the

## 2023-08-31 ENCOUNTER — TELEPHONE (OUTPATIENT)
Dept: PRIMARY CARE CLINIC | Age: 68
End: 2023-08-31

## 2023-08-31 DIAGNOSIS — N63.10 MASS OF RIGHT BREAST, UNSPECIFIED QUADRANT: Primary | ICD-10-CM

## 2023-08-31 NOTE — TELEPHONE ENCOUNTER
Patient called in today and states she found a lump n her right breast and would like a mammogram done. She also states that she would like a lift chair and that in the order you have to put that she has to use her walker to get to her recliner she sits in.

## 2023-08-31 NOTE — TELEPHONE ENCOUNTER
Mammogram and US ordered for breast lump       I can order lift chair but they will require her to f/u in office for visit where this is all discussed and documented  Does she want to schedule now?

## 2023-09-07 ENCOUNTER — TELEPHONE (OUTPATIENT)
Dept: PRIMARY CARE CLINIC | Age: 68
End: 2023-09-07

## 2023-09-07 DIAGNOSIS — N63.15 MASS OVERLAPPING MULTIPLE QUADRANTS OF RIGHT BREAST: Primary | ICD-10-CM

## 2023-09-07 NOTE — TELEPHONE ENCOUNTER
[facility] scheduling called in stated that the code N63.10 on both mammo and US does not pass under patients insurance. They stated you will have to be more specific. They need new order with new code.

## 2023-09-08 NOTE — TELEPHONE ENCOUNTER
Patient didn't understand the 'clock placement' for her breast. She did agree that it was on the outer side (toward her arm) of her R breast. She said lower than her armpit, and it is higher than her elbow. My best guess is the 3:00 position.

## 2023-09-08 NOTE — TELEPHONE ENCOUNTER
Someone will need to call pt and ask her where her breast lump is then  She never specified initially  Right breast, but whereabout in terms of a clock?

## 2023-09-11 ENCOUNTER — CLINICAL DOCUMENTATION (OUTPATIENT)
Dept: SPIRITUAL SERVICES | Age: 68
End: 2023-09-11

## 2023-09-11 NOTE — ACP (ADVANCE CARE PLANNING)
Advance Care Planning   Ambulatory ACP Specialist Patient Outreach    Date:  9/11/2023    ACP Specialist:  Rev Melania Donnelly    Outreach call to patient in follow-up to ACP Specialist referral from:Carlotta Calzada MD    [x] PCP  [] Provider   [] Ambulatory Care Management [] Other     For:                  [x] Advance Directive Assistance              [] Complete Portable DNR order              [] Complete POST/POLST/MOST              [] Code Status Discussion             [] Discuss Goals of Care             [] Early ACP Decision-Making              [] Other (Specify)    Date Referral Received: 8/28    Next Step:   [] ACP scheduled conversation  [x] Outreach again in one week               [] Email / Mail [address]  [] Email / Mail Advance Directive   [] Closing referral.  Routing closure to referring provider/staff and to ACP Specialist . [] Closure letter mailed to patient with invitation to contact ACP Specialist if / when ready. [] Other (Specify here):         [x] At this time, Healthcare Decision Maker Is:        [] Primary agent named in scanned advance directive. [] Legal Next of Kin. [x] Unable to determine legal decision maker at this time. Outreaches:       [] 1st -  Date:                 Intervention:  [] Spoke with Patient   [] Left Voice mail [] Email / Mail    [] [employer]  [] Other [id number]) : Outcomes:           [x] 2nd -  Date:  9/11               Intervention:  [] Spoke with Patient  [x] Left Voice mail [] Email / Mail    [] [employer]  [] Other [id number]) : Outcomes: Call again in 2 wks. [] 3rd -  Date:                Intervention:  [] Spoke with Patient   [] Left Voice mail [] Email / Mail    [] [employer]  [] Other [id number]) : Outcomes:           []  Additional Outreach -  Date:     (Specify Dates & special circumstances): Outcomes:          Thank you for this referral.

## 2023-09-18 ENCOUNTER — TELEPHONE (OUTPATIENT)
Dept: PRIMARY CARE CLINIC | Age: 68
End: 2023-09-18

## 2023-09-18 DIAGNOSIS — N63.15 MASS OVERLAPPING MULTIPLE QUADRANTS OF RIGHT BREAST: Primary | ICD-10-CM

## 2023-09-18 DIAGNOSIS — N63.15 MASS OVERLAPPING MULTIPLE QUADRANTS OF RIGHT BREAST: ICD-10-CM

## 2023-09-20 DIAGNOSIS — N63.15 MASS OVERLAPPING MULTIPLE QUADRANTS OF RIGHT BREAST: Primary | ICD-10-CM

## 2023-09-29 ENCOUNTER — CLINICAL DOCUMENTATION (OUTPATIENT)
Dept: SPIRITUAL SERVICES | Age: 68
End: 2023-09-29

## 2023-09-29 NOTE — PROGRESS NOTES
Advance Care Planning   Ambulatory ACP Specialist Patient Outreach    Date:  9/29/2023    ACP Specialist:  Rev Melania Donnelly    Outreach call to patient in follow-up to ACP Specialist referral from:Carlotta Sainz MD    [x] PCP  [] Provider   [] Ambulatory Care Management [] Other     For:                  [x] Advance Directive Assistance              [] Complete Portable DNR order              [] Complete POST/POLST/MOST              [] Code Status Discussion             [] Discuss Goals of Care             [] Early ACP Decision-Making              [] Other (Specify)    Date Referral Received: 8/28    Next Step:   [] ACP scheduled conversation  [] Outreach again in one week               [] Email / Mail [address]  [] Email / Mail Advance Directive   [x] Closing referral.  Routing closure to referring provider/staff and to ACP Specialist . [] Closure letter mailed to patient with invitation to contact ACP Specialist if / when ready. [] Other (Specify here):         [x] At this time, Healthcare Decision Maker Is:        [] Primary agent named in scanned advance directive. [x] Legal Next of Kin. [] Unable to determine legal decision maker at this time. Outreaches:       [] 1st -  Date:                 Intervention:  [] Spoke with Patient   [] Left Voice mail [] Email / Mail    [] [employer]  [] Other [id number]) : Outcomes:           [] 2nd -  Date:                 Intervention:  [] Spoke with Patient  [] Left Voice mail [] Email / Mail    [] [employer]  [] Other [id number]) : Outcomes:                [x] 3rd -  Date:  9/29              Intervention:  [x] Spoke with Patient   [] Left Voice mail [] Email / Mail    [] [employer]  [] Other [id number]) : Outcomes: Close referral. Patient is not interested. []  Additional Outreach -  Date:     (Specify Dates & special circumstances): Outcomes:          Thank you for this referral.

## 2023-10-09 RX ORDER — TIZANIDINE 4 MG/1
TABLET ORAL
Qty: 90 TABLET | Refills: 1 | Status: SHIPPED | OUTPATIENT
Start: 2023-10-09

## 2023-10-20 DIAGNOSIS — M54.50 CHRONIC MIDLINE LOW BACK PAIN WITHOUT SCIATICA: ICD-10-CM

## 2023-10-20 DIAGNOSIS — G89.29 CHRONIC MIDLINE LOW BACK PAIN WITHOUT SCIATICA: ICD-10-CM

## 2023-10-20 DIAGNOSIS — M25.561 CHRONIC PAIN OF BOTH KNEES: ICD-10-CM

## 2023-10-20 DIAGNOSIS — M25.562 CHRONIC PAIN OF BOTH KNEES: ICD-10-CM

## 2023-10-20 DIAGNOSIS — G89.29 CHRONIC PAIN OF BOTH KNEES: ICD-10-CM

## 2023-10-20 RX ORDER — MELOXICAM 7.5 MG/1
TABLET ORAL
Qty: 30 TABLET | Refills: 3 | Status: SHIPPED | OUTPATIENT
Start: 2023-10-20

## 2023-11-13 DIAGNOSIS — R60.0 BILATERAL LOWER EXTREMITY EDEMA: ICD-10-CM

## 2023-11-13 RX ORDER — FUROSEMIDE 40 MG/1
TABLET ORAL
Qty: 30 TABLET | Refills: 2 | Status: SHIPPED | OUTPATIENT
Start: 2023-11-13

## 2023-12-08 ENCOUNTER — OFFICE VISIT (OUTPATIENT)
Dept: PODIATRY | Age: 68
End: 2023-12-08
Payer: MEDICARE

## 2023-12-08 VITALS — HEIGHT: 65 IN | BODY MASS INDEX: 44.82 KG/M2 | WEIGHT: 269 LBS

## 2023-12-08 DIAGNOSIS — M79.675 PAIN OF TOE OF LEFT FOOT: ICD-10-CM

## 2023-12-08 DIAGNOSIS — M79.674 PAIN OF TOE OF RIGHT FOOT: ICD-10-CM

## 2023-12-08 DIAGNOSIS — I89.0 LYMPHEDEMA OF BOTH LOWER EXTREMITIES: ICD-10-CM

## 2023-12-08 DIAGNOSIS — R26.2 DIFFICULTY WALKING: ICD-10-CM

## 2023-12-08 DIAGNOSIS — I73.9 PVD (PERIPHERAL VASCULAR DISEASE) (HCC): ICD-10-CM

## 2023-12-08 DIAGNOSIS — B35.1 ONYCHOMYCOSIS: Primary | ICD-10-CM

## 2023-12-08 PROCEDURE — 4004F PT TOBACCO SCREEN RCVD TLK: CPT | Performed by: PODIATRIST

## 2023-12-08 PROCEDURE — 1090F PRES/ABSN URINE INCON ASSESS: CPT | Performed by: PODIATRIST

## 2023-12-08 PROCEDURE — 3017F COLORECTAL CA SCREEN DOC REV: CPT | Performed by: PODIATRIST

## 2023-12-08 PROCEDURE — G8427 DOCREV CUR MEDS BY ELIG CLIN: HCPCS | Performed by: PODIATRIST

## 2023-12-08 PROCEDURE — 99203 OFFICE O/P NEW LOW 30 MIN: CPT | Performed by: PODIATRIST

## 2023-12-08 PROCEDURE — 11721 DEBRIDE NAIL 6 OR MORE: CPT | Performed by: PODIATRIST

## 2023-12-08 PROCEDURE — G8417 CALC BMI ABV UP PARAM F/U: HCPCS | Performed by: PODIATRIST

## 2023-12-08 PROCEDURE — G8400 PT W/DXA NO RESULTS DOC: HCPCS | Performed by: PODIATRIST

## 2023-12-08 PROCEDURE — G8484 FLU IMMUNIZE NO ADMIN: HCPCS | Performed by: PODIATRIST

## 2023-12-08 PROCEDURE — 1123F ACP DISCUSS/DSCN MKR DOCD: CPT | Performed by: PODIATRIST

## 2023-12-28 ENCOUNTER — OFFICE VISIT (OUTPATIENT)
Dept: FAMILY MEDICINE CLINIC | Age: 68
End: 2023-12-28
Payer: MEDICARE

## 2023-12-28 VITALS
HEART RATE: 96 BPM | HEIGHT: 65 IN | SYSTOLIC BLOOD PRESSURE: 126 MMHG | WEIGHT: 270 LBS | DIASTOLIC BLOOD PRESSURE: 82 MMHG | TEMPERATURE: 97.4 F | RESPIRATION RATE: 18 BRPM | BODY MASS INDEX: 44.98 KG/M2 | OXYGEN SATURATION: 94 %

## 2023-12-28 DIAGNOSIS — J45.41 MODERATE PERSISTENT ASTHMA WITH EXACERBATION: Primary | ICD-10-CM

## 2023-12-28 DIAGNOSIS — J98.11 ATELECTASIS, RIGHT: ICD-10-CM

## 2023-12-28 DIAGNOSIS — R05.9 COUGH, UNSPECIFIED TYPE: ICD-10-CM

## 2023-12-28 LAB
INFLUENZA A ANTIGEN, POC: NORMAL
INFLUENZA B ANTIGEN, POC: NORMAL
Lab: NORMAL
PERFORMING INSTRUMENT: NORMAL
QC PASS/FAIL: NORMAL
SARS-COV-2, POC: NORMAL

## 2023-12-28 PROCEDURE — 87426 SARSCOV CORONAVIRUS AG IA: CPT | Performed by: NURSE PRACTITIONER

## 2023-12-28 PROCEDURE — 3079F DIAST BP 80-89 MM HG: CPT | Performed by: NURSE PRACTITIONER

## 2023-12-28 PROCEDURE — 94640 AIRWAY INHALATION TREATMENT: CPT | Performed by: NURSE PRACTITIONER

## 2023-12-28 PROCEDURE — G8484 FLU IMMUNIZE NO ADMIN: HCPCS | Performed by: NURSE PRACTITIONER

## 2023-12-28 PROCEDURE — 4004F PT TOBACCO SCREEN RCVD TLK: CPT | Performed by: NURSE PRACTITIONER

## 2023-12-28 PROCEDURE — 1123F ACP DISCUSS/DSCN MKR DOCD: CPT | Performed by: NURSE PRACTITIONER

## 2023-12-28 PROCEDURE — G8400 PT W/DXA NO RESULTS DOC: HCPCS | Performed by: NURSE PRACTITIONER

## 2023-12-28 PROCEDURE — G8427 DOCREV CUR MEDS BY ELIG CLIN: HCPCS | Performed by: NURSE PRACTITIONER

## 2023-12-28 PROCEDURE — 3017F COLORECTAL CA SCREEN DOC REV: CPT | Performed by: NURSE PRACTITIONER

## 2023-12-28 PROCEDURE — G8417 CALC BMI ABV UP PARAM F/U: HCPCS | Performed by: NURSE PRACTITIONER

## 2023-12-28 PROCEDURE — 87804 INFLUENZA ASSAY W/OPTIC: CPT | Performed by: NURSE PRACTITIONER

## 2023-12-28 PROCEDURE — 99214 OFFICE O/P EST MOD 30 MIN: CPT | Performed by: NURSE PRACTITIONER

## 2023-12-28 PROCEDURE — 3074F SYST BP LT 130 MM HG: CPT | Performed by: NURSE PRACTITIONER

## 2023-12-28 PROCEDURE — 1090F PRES/ABSN URINE INCON ASSESS: CPT | Performed by: NURSE PRACTITIONER

## 2023-12-28 RX ORDER — IPRATROPIUM BROMIDE AND ALBUTEROL SULFATE 2.5; .5 MG/3ML; MG/3ML
1 SOLUTION RESPIRATORY (INHALATION) ONCE
Status: COMPLETED | OUTPATIENT
Start: 2023-12-28 | End: 2023-12-28

## 2023-12-28 RX ORDER — AZITHROMYCIN 250 MG/1
250 TABLET, FILM COATED ORAL SEE ADMIN INSTRUCTIONS
Qty: 6 TABLET | Refills: 0 | Status: SHIPPED | OUTPATIENT
Start: 2023-12-28 | End: 2024-01-02

## 2023-12-28 RX ORDER — AMOXICILLIN AND CLAVULANATE POTASSIUM 875; 125 MG/1; MG/1
1 TABLET, FILM COATED ORAL 2 TIMES DAILY
Qty: 10 TABLET | Refills: 0 | Status: SHIPPED | OUTPATIENT
Start: 2023-12-28 | End: 2024-01-02

## 2023-12-28 RX ADMIN — IPRATROPIUM BROMIDE AND ALBUTEROL SULFATE 1 DOSE: 2.5; .5 SOLUTION RESPIRATORY (INHALATION) at 08:59

## 2023-12-28 NOTE — PROGRESS NOTES
is warm and dry. Capillary Refill: Capillary refill takes less than 2 seconds. Neurological:      General: No focal deficit present. Mental Status: She is alert and oriented to person, place, and time. Psychiatric:         Mood and Affect: Mood normal.         Behavior: Behavior normal.         Thought Content: Thought content normal.         Judgment: Judgment normal.           Lab / Imaging Results   (All laboratory and radiology results have been personally reviewed by myself)  Labs:  Results for orders placed or performed in visit on 12/28/23   POCT COVID-19, Antigen   Result Value Ref Range    SARS-COV-2, POC Not-Detected Not Detected    Lot Number [id number]     QC Pass/Fail pass     Performing Instrument BD Veritor    POCT Influenza A/B Antigen   Result Value Ref Range    Inflenza A Ag neg     Influenza B Ag neg        Imaging: All Radiology results interpreted by Radiologist unless otherwise noted. No results found. Assessment/Plan  Elena was seen today for cough. Diagnoses and all orders for this visit:    Moderate persistent asthma with exacerbation  -     XR CHEST STANDARD (2 VW); Future  -     ipratropium 0.5 mg-albuterol 2.5 mg (DUONEB) nebulizer solution 1 Dose  -     amoxicillin-clavulanate (AUGMENTIN) 875-125 MG per tablet; Take 1 tablet by mouth 2 times daily for 5 days  -     azithromycin (ZITHROMAX) 250 MG tablet; Take 1 tablet by mouth See Admin Instructions for 5 days 500mg on day 1 followed by 250mg on days 2 - 5    Cough, unspecified type  -     POCT COVID-19, Antigen  -     XR CHEST STANDARD (2 VW); Future  -     POCT Influenza A/B Antigen  -     ipratropium 0.5 mg-albuterol 2.5 mg (DUONEB) nebulizer solution 1 Dose    Atelectasis, right  -     amoxicillin-clavulanate (AUGMENTIN) 875-125 MG per tablet; Take 1 tablet by mouth 2 times daily for 5 days  -     azithromycin (ZITHROMAX) 250 MG tablet;  Take 1 tablet by mouth See Admin Instructions for 5 days 500mg on day 1

## 2023-12-29 DIAGNOSIS — R05.9 COUGH, UNSPECIFIED TYPE: ICD-10-CM

## 2023-12-29 DIAGNOSIS — J45.41 MODERATE PERSISTENT ASTHMA WITH EXACERBATION: ICD-10-CM

## 2023-12-31 DIAGNOSIS — E78.2 MIXED HYPERLIPIDEMIA: ICD-10-CM

## 2024-01-02 ENCOUNTER — OFFICE VISIT (OUTPATIENT)
Dept: PRIMARY CARE CLINIC | Age: 69
End: 2024-01-02
Payer: MEDICARE

## 2024-01-02 VITALS
DIASTOLIC BLOOD PRESSURE: 80 MMHG | SYSTOLIC BLOOD PRESSURE: 156 MMHG | HEART RATE: 95 BPM | BODY MASS INDEX: 48.65 KG/M2 | WEIGHT: 292 LBS | HEIGHT: 65 IN | TEMPERATURE: 98.5 F | OXYGEN SATURATION: 95 %

## 2024-01-02 DIAGNOSIS — J45.41 MODERATE PERSISTENT ASTHMA WITH ACUTE EXACERBATION: ICD-10-CM

## 2024-01-02 DIAGNOSIS — N62 LARGE BREASTS: ICD-10-CM

## 2024-01-02 DIAGNOSIS — R05.1 ACUTE COUGH: Primary | ICD-10-CM

## 2024-01-02 DIAGNOSIS — R19.7 DIARRHEA, UNSPECIFIED TYPE: ICD-10-CM

## 2024-01-02 PROCEDURE — G8484 FLU IMMUNIZE NO ADMIN: HCPCS | Performed by: FAMILY MEDICINE

## 2024-01-02 PROCEDURE — 3017F COLORECTAL CA SCREEN DOC REV: CPT | Performed by: FAMILY MEDICINE

## 2024-01-02 PROCEDURE — G8417 CALC BMI ABV UP PARAM F/U: HCPCS | Performed by: FAMILY MEDICINE

## 2024-01-02 PROCEDURE — 1123F ACP DISCUSS/DSCN MKR DOCD: CPT | Performed by: FAMILY MEDICINE

## 2024-01-02 PROCEDURE — G8400 PT W/DXA NO RESULTS DOC: HCPCS | Performed by: FAMILY MEDICINE

## 2024-01-02 PROCEDURE — 99215 OFFICE O/P EST HI 40 MIN: CPT | Performed by: FAMILY MEDICINE

## 2024-01-02 PROCEDURE — 94640 AIRWAY INHALATION TREATMENT: CPT | Performed by: FAMILY MEDICINE

## 2024-01-02 PROCEDURE — 4004F PT TOBACCO SCREEN RCVD TLK: CPT | Performed by: FAMILY MEDICINE

## 2024-01-02 PROCEDURE — 1090F PRES/ABSN URINE INCON ASSESS: CPT | Performed by: FAMILY MEDICINE

## 2024-01-02 PROCEDURE — G8427 DOCREV CUR MEDS BY ELIG CLIN: HCPCS | Performed by: FAMILY MEDICINE

## 2024-01-02 PROCEDURE — 3077F SYST BP >= 140 MM HG: CPT | Performed by: FAMILY MEDICINE

## 2024-01-02 PROCEDURE — 3079F DIAST BP 80-89 MM HG: CPT | Performed by: FAMILY MEDICINE

## 2024-01-02 RX ORDER — TIZANIDINE 4 MG/1
TABLET ORAL
Qty: 90 TABLET | Refills: 1 | Status: SHIPPED | OUTPATIENT
Start: 2024-01-02

## 2024-01-02 RX ORDER — POTASSIUM CHLORIDE 20 MEQ/1
40 TABLET, EXTENDED RELEASE ORAL DAILY
Qty: 60 TABLET | Refills: 5 | Status: SHIPPED | OUTPATIENT
Start: 2024-01-02

## 2024-01-02 RX ORDER — LEVOTHYROXINE SODIUM 0.03 MG/1
TABLET ORAL
Qty: 90 TABLET | Refills: 1 | Status: SHIPPED | OUTPATIENT
Start: 2024-01-02

## 2024-01-02 RX ORDER — ATORVASTATIN CALCIUM 40 MG/1
40 TABLET, FILM COATED ORAL DAILY
Qty: 90 TABLET | Refills: 1 | Status: SHIPPED | OUTPATIENT
Start: 2024-01-02

## 2024-01-02 RX ORDER — PREDNISONE 10 MG/1
TABLET ORAL
Qty: 30 TABLET | Refills: 0 | Status: SHIPPED | OUTPATIENT
Start: 2024-01-02

## 2024-01-02 RX ORDER — MULTIVIT-MIN/IRON/FOLIC ACID/K 18-600-40
CAPSULE ORAL
Qty: 30 TABLET | Refills: 3 | Status: SHIPPED | OUTPATIENT
Start: 2024-01-02

## 2024-01-02 RX ORDER — BUMETANIDE 1 MG/1
TABLET ORAL
Qty: 60 TABLET | Refills: 0 | Status: SHIPPED | OUTPATIENT
Start: 2024-01-02

## 2024-01-02 RX ORDER — ATORVASTATIN CALCIUM 40 MG/1
TABLET, FILM COATED ORAL
Qty: 90 TABLET | Refills: 1 | Status: SHIPPED
Start: 2024-01-02 | End: 2024-01-02 | Stop reason: SDUPTHER

## 2024-01-02 RX ORDER — FUROSEMIDE 40 MG/1
40 TABLET ORAL DAILY
Qty: 30 TABLET | Refills: 2 | Status: SHIPPED | OUTPATIENT
Start: 2024-01-02

## 2024-01-02 RX ORDER — IPRATROPIUM BROMIDE AND ALBUTEROL SULFATE 2.5; .5 MG/3ML; MG/3ML
1 SOLUTION RESPIRATORY (INHALATION) ONCE
Status: COMPLETED | OUTPATIENT
Start: 2024-01-02 | End: 2024-01-02

## 2024-01-02 RX ADMIN — IPRATROPIUM BROMIDE AND ALBUTEROL SULFATE 1 DOSE: 2.5; .5 SOLUTION RESPIRATORY (INHALATION) at 14:57

## 2024-01-02 ASSESSMENT — PATIENT HEALTH QUESTIONNAIRE - PHQ9
5. POOR APPETITE OR OVEREATING: 0
8. MOVING OR SPEAKING SO SLOWLY THAT OTHER PEOPLE COULD HAVE NOTICED. OR THE OPPOSITE, BEING SO FIGETY OR RESTLESS THAT YOU HAVE BEEN MOVING AROUND A LOT MORE THAN USUAL: 0
SUM OF ALL RESPONSES TO PHQ QUESTIONS 1-9: 3
9. THOUGHTS THAT YOU WOULD BE BETTER OFF DEAD, OR OF HURTING YOURSELF: 0
7. TROUBLE CONCENTRATING ON THINGS, SUCH AS READING THE NEWSPAPER OR WATCHING TELEVISION: 0
2. FEELING DOWN, DEPRESSED OR HOPELESS: 0
1. LITTLE INTEREST OR PLEASURE IN DOING THINGS: 0
SUM OF ALL RESPONSES TO PHQ9 QUESTIONS 1 & 2: 0
SUM OF ALL RESPONSES TO PHQ QUESTIONS 1-9: 3
3. TROUBLE FALLING OR STAYING ASLEEP: 0
10. IF YOU CHECKED OFF ANY PROBLEMS, HOW DIFFICULT HAVE THESE PROBLEMS MADE IT FOR YOU TO DO YOUR WORK, TAKE CARE OF THINGS AT HOME, OR GET ALONG WITH OTHER PEOPLE: 0
SUM OF ALL RESPONSES TO PHQ QUESTIONS 1-9: 3
SUM OF ALL RESPONSES TO PHQ QUESTIONS 1-9: 3
4. FEELING TIRED OR HAVING LITTLE ENERGY: 3
6. FEELING BAD ABOUT YOURSELF - OR THAT YOU ARE A FAILURE OR HAVE LET YOURSELF OR YOUR FAMILY DOWN: 0

## 2024-01-02 NOTE — PROGRESS NOTES
24  Elena Sales : 1955 Sex: female  Age: 68 y.o.      Assessment and Plan:  Elena was seen today for follow-up.    Diagnoses and all orders for this visit:    Acute cough  -     XR CHEST (2 VW); Future  -     ipratropium 0.5 mg-albuterol 2.5 mg (DUONEB) nebulizer solution 1 Dose  -     Basic Metabolic Panel; Future  -     CBC with Auto Differential; Future  Pt had dounebs which she felt was helpful at her last OV  Repeat examination did not show significant improvement to her lungs- maybe sligtly broken up  Pulse ox had dropped to 90% for staff right after tx but was 93% on my repeat exam  Pt stated she felt a bit better and refused ER  She is amenable to repeat CXR but was brought by [facility] so unable to go today or for another few days   Will send steroid taper  Will hold off on repeat Abx at this time given concern for Abx associated diarrhea; however may need to retreat   Pt amenable to labs   Further recommendations pending results    Diarrhea, unspecified type  -     Basic Metabolic Panel; Future  -     CBC with Auto Differential; Future  Maybe Abx associated    Body mass index (BMI) 45.0-49.9, adult ([facility])  Pt aware of weight     Moderate persistent asthma with acute exacerbation  As above   Encouraged to use her albuterol every 4 hours while sick     Large breasts  -     External Referral To Plastic Surgery    Other orders  -     atorvastatin (LIPITOR) 40 MG tablet; Take 1 tablet by mouth daily  -     bumetanide (BUMEX) 1 MG tablet; TAKE TWO TABLETS BY MOUTH EVERY DAY  -     furosemide (LASIX) 40 MG tablet; Take 1 tablet by mouth daily  -     levothyroxine (SYNTHROID) 25 MCG tablet; TAKE ONE TABLET BY MOUTH EVERY DAY  -     potassium chloride (KLOR-CON M) 20 MEQ extended release tablet; Take 2 tablets by mouth daily As directed  -     tiZANidine (ZANAFLEX) 4 MG tablet; TAKE ONE TABLET BY MOUTH THREE TIMES DAILY AS NEEDED  -     Vitamin D, Cholecalciferol, 25 MCG (1000 UT) TABS; TAKE ONE

## 2024-01-09 ENCOUNTER — OFFICE VISIT (OUTPATIENT)
Dept: PRIMARY CARE CLINIC | Age: 69
End: 2024-01-09
Payer: MEDICARE

## 2024-01-09 VITALS
SYSTOLIC BLOOD PRESSURE: 138 MMHG | HEART RATE: 102 BPM | OXYGEN SATURATION: 94 % | BODY MASS INDEX: 45.15 KG/M2 | HEIGHT: 65 IN | DIASTOLIC BLOOD PRESSURE: 80 MMHG | TEMPERATURE: 97 F | WEIGHT: 271 LBS

## 2024-01-09 DIAGNOSIS — R05.1 ACUTE COUGH: Primary | ICD-10-CM

## 2024-01-09 DIAGNOSIS — E78.2 MIXED HYPERLIPIDEMIA: ICD-10-CM

## 2024-01-09 DIAGNOSIS — R60.0 BILATERAL LOWER EXTREMITY EDEMA: ICD-10-CM

## 2024-01-09 DIAGNOSIS — E66.01 MORBID OBESITY WITH BMI OF 45.0-49.9, ADULT (HCC): ICD-10-CM

## 2024-01-09 DIAGNOSIS — F31.81 BIPOLAR 2 DISORDER (HCC): ICD-10-CM

## 2024-01-09 LAB
ABSOLUTE IMMATURE GRANULOCYTE: 0.15 K/UL (ref 0–0.58)
ALBUMIN SERPL-MCNC: 4.6 G/DL (ref 3.5–5.2)
ALP BLD-CCNC: 110 U/L (ref 35–104)
ALT SERPL-CCNC: 34 U/L (ref 0–32)
ANION GAP SERPL CALCULATED.3IONS-SCNC: 18 MMOL/L (ref 7–16)
AST SERPL-CCNC: 29 U/L (ref 0–31)
BASOPHILS ABSOLUTE: 0.08 K/UL (ref 0–0.2)
BASOPHILS RELATIVE PERCENT: 1 % (ref 0–2)
BILIRUB SERPL-MCNC: 0.3 MG/DL (ref 0–1.2)
BILIRUBIN DIRECT: <0.2 MG/DL (ref 0–0.3)
BILIRUBIN, INDIRECT: ABNORMAL MG/DL (ref 0–1)
BUN BLDV-MCNC: 30 MG/DL (ref 6–23)
CALCIUM SERPL-MCNC: 9.5 MG/DL (ref 8.6–10.2)
CHLORIDE BLD-SCNC: 96 MMOL/L (ref 98–107)
CHOLESTEROL: 219 MG/DL
CO2: 27 MMOL/L (ref 22–29)
CREAT SERPL-MCNC: 1 MG/DL (ref 0.5–1)
EOSINOPHILS ABSOLUTE: 0.07 K/UL (ref 0.05–0.5)
EOSINOPHILS RELATIVE PERCENT: 1 % (ref 0–6)
GFR SERPL CREATININE-BSD FRML MDRD: >60 ML/MIN/1.73M2
GLUCOSE BLD-MCNC: 106 MG/DL (ref 74–99)
HCT VFR BLD CALC: 48.9 % (ref 34–48)
HDLC SERPL-MCNC: 62 MG/DL
HEMOGLOBIN: 16.2 G/DL (ref 11.5–15.5)
IMMATURE GRANULOCYTES: 1 % (ref 0–5)
LDL CHOLESTEROL: 108 MG/DL
LYMPHOCYTES ABSOLUTE: 4.87 K/UL (ref 1.5–4)
LYMPHOCYTES RELATIVE PERCENT: 33 % (ref 20–42)
MCH RBC QN AUTO: 31.5 PG (ref 26–35)
MCHC RBC AUTO-ENTMCNC: 33.1 G/DL (ref 32–34.5)
MCV RBC AUTO: 95.1 FL (ref 80–99.9)
MONOCYTES ABSOLUTE: 0.92 K/UL (ref 0.1–0.95)
MONOCYTES RELATIVE PERCENT: 6 % (ref 2–12)
NEUTROPHILS ABSOLUTE: 8.78 K/UL (ref 1.8–7.3)
NEUTROPHILS RELATIVE PERCENT: 59 % (ref 43–80)
PLATELET # BLD: 375 K/UL (ref 130–450)
PMV BLD AUTO: 11.1 FL (ref 7–12)
POTASSIUM SERPL-SCNC: 3.9 MMOL/L (ref 3.5–5)
RBC # BLD: 5.14 M/UL (ref 3.5–5.5)
SODIUM BLD-SCNC: 141 MMOL/L (ref 132–146)
TOTAL PROTEIN: 8 G/DL (ref 6.4–8.3)
TRIGL SERPL-MCNC: 243 MG/DL
VITAMIN D 25-HYDROXY: 19.5 NG/ML (ref 30–100)
VLDLC SERPL CALC-MCNC: 49 MG/DL
WBC # BLD: 14.9 K/UL (ref 4.5–11.5)

## 2024-01-09 PROCEDURE — 99214 OFFICE O/P EST MOD 30 MIN: CPT | Performed by: FAMILY MEDICINE

## 2024-01-09 PROCEDURE — G8417 CALC BMI ABV UP PARAM F/U: HCPCS | Performed by: FAMILY MEDICINE

## 2024-01-09 PROCEDURE — G8484 FLU IMMUNIZE NO ADMIN: HCPCS | Performed by: FAMILY MEDICINE

## 2024-01-09 PROCEDURE — 3017F COLORECTAL CA SCREEN DOC REV: CPT | Performed by: FAMILY MEDICINE

## 2024-01-09 PROCEDURE — 3079F DIAST BP 80-89 MM HG: CPT | Performed by: FAMILY MEDICINE

## 2024-01-09 PROCEDURE — G8400 PT W/DXA NO RESULTS DOC: HCPCS | Performed by: FAMILY MEDICINE

## 2024-01-09 PROCEDURE — 4004F PT TOBACCO SCREEN RCVD TLK: CPT | Performed by: FAMILY MEDICINE

## 2024-01-09 PROCEDURE — 3075F SYST BP GE 130 - 139MM HG: CPT | Performed by: FAMILY MEDICINE

## 2024-01-09 PROCEDURE — 1123F ACP DISCUSS/DSCN MKR DOCD: CPT | Performed by: FAMILY MEDICINE

## 2024-01-09 PROCEDURE — G8427 DOCREV CUR MEDS BY ELIG CLIN: HCPCS | Performed by: FAMILY MEDICINE

## 2024-01-09 PROCEDURE — 1090F PRES/ABSN URINE INCON ASSESS: CPT | Performed by: FAMILY MEDICINE

## 2024-01-09 NOTE — PROGRESS NOTES
24  Elena Sales : 1955 Sex: female  Age: 68 y.o.      Assessment and Plan:  Elena was seen today for follow-up.    Diagnoses and all orders for this visit:    Acute cough  Improving. Pt encouraged to reach out right away if any set backs.     Bipolar 2 disorder (HCC)  Stable. Cont current treatment as documented below.     Mixed hyperlipidemia  -     Basic Metabolic Panel; Future  -     CBC with Auto Differential; Future  -     Lipid Panel; Future  -     Hepatic Function Panel; Future  Further recommendations pending results    Morbid obesity with BMI of 45.0-49.9, adult (HCC)  -     Vitamin D 25 Hydroxy; Future    Bilateral lower extremity edema  Stable. Cont current treatment as documented below.         Return in about 4 weeks (around 2024).        Chief Complaint   Patient presents with    Follow-up       HPI  Pt here for short term f/u acute cough  Feeling better since her last visit  Still on her steroid taper, has about 6 pills left   Cough and congestion are improved   Still with some mucous in her upper chest, but starting to thin out   She never did get to do the CXR   Still not smoking!!   Diarrhea has resolved now that she's off the ABx    States she is not taking the bumex, lasix, potassium   Using it just PRN   Her swelling has been good recently     Mood/bipolar 2 has been stable overall per patient   Follows with Dr. Yip      Problem list reviewed and updated in full with patient today as necessary.  A comprehensive ROS was negative, except as documented above.       Current Outpatient Medications:     atorvastatin (LIPITOR) 40 MG tablet, Take 1 tablet by mouth daily, Disp: 90 tablet, Rfl: 1    bumetanide (BUMEX) 1 MG tablet, TAKE TWO TABLETS BY MOUTH EVERY DAY, Disp: 60 tablet, Rfl: 0    furosemide (LASIX) 40 MG tablet, Take 1 tablet by mouth daily, Disp: 30 tablet, Rfl: 2    levothyroxine (SYNTHROID) 25 MCG tablet, TAKE ONE TABLET BY MOUTH EVERY DAY, Disp: 90 tablet,

## 2024-01-12 ENCOUNTER — TELEPHONE (OUTPATIENT)
Dept: PRIMARY CARE CLINIC | Age: 69
End: 2024-01-12

## 2024-01-12 RX ORDER — NITROFURANTOIN 25; 75 MG/1; MG/1
100 CAPSULE ORAL 2 TIMES DAILY
Qty: 20 CAPSULE | Refills: 0 | Status: SHIPPED | OUTPATIENT
Start: 2024-01-12 | End: 2024-01-22

## 2024-01-12 NOTE — TELEPHONE ENCOUNTER
I did not suggest the ER   I said I had a low threshold for the ER if she had complaints given her lab results  She didn't mention UTI sx during visit on Tuesday   Ideally would bring in a urine so we can appropriately manage  Med sent however if she is sure that's what's going on  Please at least document her sx so we can ensure they're improved with treatment

## 2024-01-12 NOTE — TELEPHONE ENCOUNTER
Pt was here Tuesday and she had uti and said she received a call and you had suggested she go to ER but she does not want to go to ER and wants to know if you could call her in a script  Pt also said she has been drinking a lot of water

## 2024-01-12 NOTE — TELEPHONE ENCOUNTER
Left message asking pt to call back to provide and receive more information, but also stated that there is a script waiting for her if she is having UTI symptoms.

## 2024-01-16 NOTE — TELEPHONE ENCOUNTER
I spoke with patient and she does not have any symptoms of a UTI. In the lab results, her WBC had been elevated due to her being sick since with pneumonia. She is feeling better from the pneumonia and no other symptoms of anything. She was wondering if she can discontinue the antibiotic?

## 2024-02-01 ENCOUNTER — TELEPHONE (OUTPATIENT)
Dept: PRIMARY CARE CLINIC | Age: 69
End: 2024-02-01

## 2024-02-01 NOTE — TELEPHONE ENCOUNTER
I called and aldair----- Message from Krysta Vargas sent at 1/30/2024  3:15 PM EST -----  Subject: Appointment Request    Reason for Call: Established Patient Appointment needed: Routine Medicare   AWV    QUESTIONS    Reason for appointment request? No appointments available during search     Additional Information for Provider? Patient would like to reschedule her   AWV wants in office, it is only showing Virtual. please call back to   advise.  ---------------------------------------------------------------------------  --------------  CALL BACK INFO  [phone]; OK to leave message on voicemail  ---------------------------------------------------------------------------  --------------  SCRIPT ANSWERS

## 2024-02-06 DIAGNOSIS — G89.29 CHRONIC MIDLINE LOW BACK PAIN WITHOUT SCIATICA: ICD-10-CM

## 2024-02-06 DIAGNOSIS — M54.50 CHRONIC MIDLINE LOW BACK PAIN WITHOUT SCIATICA: ICD-10-CM

## 2024-02-06 DIAGNOSIS — M25.562 CHRONIC PAIN OF BOTH KNEES: ICD-10-CM

## 2024-02-06 DIAGNOSIS — M25.561 CHRONIC PAIN OF BOTH KNEES: ICD-10-CM

## 2024-02-06 DIAGNOSIS — G89.29 CHRONIC PAIN OF BOTH KNEES: ICD-10-CM

## 2024-02-06 RX ORDER — MELOXICAM 7.5 MG/1
7.5 TABLET ORAL DAILY
Qty: 30 TABLET | Refills: 3 | Status: SHIPPED | OUTPATIENT
Start: 2024-02-06

## 2024-02-06 NOTE — TELEPHONE ENCOUNTER
Last Appointment:  1/9/2024  Future Appointments   Date Time Provider Department Center   2/9/2024 10:15 AM Taiwo Shaffer Jr., [facility]

## 2024-02-09 ENCOUNTER — PROCEDURE VISIT (OUTPATIENT)
Dept: PODIATRY | Age: 69
End: 2024-02-09
Payer: MEDICARE

## 2024-02-09 VITALS — HEIGHT: 65 IN | BODY MASS INDEX: 47.98 KG/M2 | WEIGHT: 288 LBS

## 2024-02-09 DIAGNOSIS — B35.1 ONYCHOMYCOSIS: Primary | ICD-10-CM

## 2024-02-09 DIAGNOSIS — R26.2 DIFFICULTY WALKING: ICD-10-CM

## 2024-02-09 DIAGNOSIS — I89.0 LYMPHEDEMA OF BOTH LOWER EXTREMITIES: ICD-10-CM

## 2024-02-09 DIAGNOSIS — M79.674 PAIN OF TOE OF RIGHT FOOT: ICD-10-CM

## 2024-02-09 DIAGNOSIS — M79.675 PAIN OF TOE OF LEFT FOOT: ICD-10-CM

## 2024-02-09 DIAGNOSIS — I73.9 PVD (PERIPHERAL VASCULAR DISEASE) (HCC): ICD-10-CM

## 2024-02-09 PROCEDURE — 99999 PR OFFICE/OUTPT VISIT,PROCEDURE ONLY: CPT | Performed by: PODIATRIST

## 2024-02-09 PROCEDURE — 11721 DEBRIDE NAIL 6 OR MORE: CPT | Performed by: PODIATRIST

## 2024-02-09 NOTE — PROGRESS NOTES
Patient here for 2 mo nail care. Carlotta Boo MD  Electronically signed by Abby Cobb LPN on 2/9/2024 at 10:24 AM    
      Vitals:    02/09/24 1023   Weight: 130.6 kg (288 lb)   Height: 1.651 m (5' 5\")       Exam:  Pedal pulses diminished to palpation bilateral foot.  At this time the nail/s 1, 2, 3, 4, 5 right foot and nail/s 1, 2, 3, 4, 5 left foot are noted to be thickened, dystrophic and discolored with subungual debris present.  Tenderness noted to palpation.  Minimal hair growth is noted to both lower extremities.  Lymphedema issues noted to both lower extremities with both varicosities and stasis skin changes present bilaterally.  Coolness is noted to the digital regions to palpation.  Capillary fill time delayed digital areas bilateral foot.  No heel fissuring or macerations of the web spaces.  No plantar calluses and/or ulcerative areas are noted.  Patient is having difficulty with gait/walking.             Plan Per Assessment  Diagnoses and all orders for this visit:    Onychomycosis    Pain of toe of right foot    Pain of toe of left foot    PVD (peripheral vascular disease) (HCC)    Lymphedema of both lower extremities    Difficulty walking        1. Evaluation and Management  2. Manual and electrical debridement of the mycotic nails was performed for thickness and length to prevent injection and/or ulceration.  3. Discussed additional lymphedema lower extremity care techniques with patient today.  4. We did discuss importance of shoe gear and foot inspection to prevent potential issues.  5. We will see the patient back at a later date for continued podiatric management and care.  Patient was advised to call the office with any questions or concerns prior to their next appointment if needed.        Seen By:    Taiwo Shaffer Jr, DPM    Electronically signed by Taiwo Shaffer Jr, DPM on 2/9/2024 at 10:40 AM      This note was created using voice recognition software.  The note was reviewed however may contain grammatical errors.

## 2024-02-26 ENCOUNTER — OFFICE VISIT (OUTPATIENT)
Dept: PRIMARY CARE CLINIC | Age: 69
End: 2024-02-26
Payer: MEDICARE

## 2024-02-26 VITALS
HEART RATE: 95 BPM | TEMPERATURE: 97.7 F | DIASTOLIC BLOOD PRESSURE: 80 MMHG | BODY MASS INDEX: 46.82 KG/M2 | OXYGEN SATURATION: 98 % | WEIGHT: 281 LBS | TEMPERATURE: 97.7 F | OXYGEN SATURATION: 98 % | BODY MASS INDEX: 46.92 KG/M2 | HEART RATE: 95 BPM | DIASTOLIC BLOOD PRESSURE: 80 MMHG | WEIGHT: 281.6 LBS | SYSTOLIC BLOOD PRESSURE: 124 MMHG | HEIGHT: 65 IN | HEIGHT: 65 IN | SYSTOLIC BLOOD PRESSURE: 124 MMHG

## 2024-02-26 DIAGNOSIS — N63.15 MASS OVERLAPPING MULTIPLE QUADRANTS OF RIGHT BREAST: Primary | ICD-10-CM

## 2024-02-26 DIAGNOSIS — M54.12 CERVICAL RADICULOPATHY: ICD-10-CM

## 2024-02-26 DIAGNOSIS — E28.39 ESTROGEN DEFICIENCY: ICD-10-CM

## 2024-02-26 DIAGNOSIS — M54.16 LUMBAR RADICULOPATHY: ICD-10-CM

## 2024-02-26 DIAGNOSIS — E78.2 MIXED HYPERLIPIDEMIA: ICD-10-CM

## 2024-02-26 DIAGNOSIS — E55.9 VITAMIN D DEFICIENCY: ICD-10-CM

## 2024-02-26 DIAGNOSIS — Z00.00 MEDICARE ANNUAL WELLNESS VISIT, SUBSEQUENT: Primary | ICD-10-CM

## 2024-02-26 PROCEDURE — 3074F SYST BP LT 130 MM HG: CPT | Performed by: FAMILY MEDICINE

## 2024-02-26 PROCEDURE — G8417 CALC BMI ABV UP PARAM F/U: HCPCS | Performed by: FAMILY MEDICINE

## 2024-02-26 PROCEDURE — G8427 DOCREV CUR MEDS BY ELIG CLIN: HCPCS | Performed by: FAMILY MEDICINE

## 2024-02-26 PROCEDURE — 1123F ACP DISCUSS/DSCN MKR DOCD: CPT | Performed by: FAMILY MEDICINE

## 2024-02-26 PROCEDURE — 99214 OFFICE O/P EST MOD 30 MIN: CPT | Performed by: FAMILY MEDICINE

## 2024-02-26 PROCEDURE — G0439 PPPS, SUBSEQ VISIT: HCPCS | Performed by: FAMILY MEDICINE

## 2024-02-26 PROCEDURE — G8400 PT W/DXA NO RESULTS DOC: HCPCS | Performed by: FAMILY MEDICINE

## 2024-02-26 PROCEDURE — 3079F DIAST BP 80-89 MM HG: CPT | Performed by: FAMILY MEDICINE

## 2024-02-26 PROCEDURE — 1090F PRES/ABSN URINE INCON ASSESS: CPT | Performed by: FAMILY MEDICINE

## 2024-02-26 PROCEDURE — 3017F COLORECTAL CA SCREEN DOC REV: CPT | Performed by: FAMILY MEDICINE

## 2024-02-26 PROCEDURE — 1036F TOBACCO NON-USER: CPT | Performed by: FAMILY MEDICINE

## 2024-02-26 PROCEDURE — G8484 FLU IMMUNIZE NO ADMIN: HCPCS | Performed by: FAMILY MEDICINE

## 2024-02-26 RX ORDER — ATORVASTATIN CALCIUM 80 MG/1
80 TABLET, FILM COATED ORAL DAILY
Qty: 90 TABLET | Refills: 1 | Status: SHIPPED | OUTPATIENT
Start: 2024-02-26

## 2024-02-26 RX ORDER — TIZANIDINE 4 MG/1
TABLET ORAL
Qty: 90 TABLET | Refills: 1 | Status: SHIPPED | OUTPATIENT
Start: 2024-02-26

## 2024-02-26 ASSESSMENT — PATIENT HEALTH QUESTIONNAIRE - PHQ9
SUM OF ALL RESPONSES TO PHQ QUESTIONS 1-9: 3
7. TROUBLE CONCENTRATING ON THINGS, SUCH AS READING THE NEWSPAPER OR WATCHING TELEVISION: 0
9. THOUGHTS THAT YOU WOULD BE BETTER OFF DEAD, OR OF HURTING YOURSELF: 0
SUM OF ALL RESPONSES TO PHQ QUESTIONS 1-9: 3
2. FEELING DOWN, DEPRESSED OR HOPELESS: 0
SUM OF ALL RESPONSES TO PHQ9 QUESTIONS 1 & 2: 0
8. MOVING OR SPEAKING SO SLOWLY THAT OTHER PEOPLE COULD HAVE NOTICED. OR THE OPPOSITE, BEING SO FIGETY OR RESTLESS THAT YOU HAVE BEEN MOVING AROUND A LOT MORE THAN USUAL: 0
1. LITTLE INTEREST OR PLEASURE IN DOING THINGS: 0
3. TROUBLE FALLING OR STAYING ASLEEP: 0
SUM OF ALL RESPONSES TO PHQ QUESTIONS 1-9: 3
6. FEELING BAD ABOUT YOURSELF - OR THAT YOU ARE A FAILURE OR HAVE LET YOURSELF OR YOUR FAMILY DOWN: 0
10. IF YOU CHECKED OFF ANY PROBLEMS, HOW DIFFICULT HAVE THESE PROBLEMS MADE IT FOR YOU TO DO YOUR WORK, TAKE CARE OF THINGS AT HOME, OR GET ALONG WITH OTHER PEOPLE: 0
SUM OF ALL RESPONSES TO PHQ QUESTIONS 1-9: 3
5. POOR APPETITE OR OVEREATING: 0
4. FEELING TIRED OR HAVING LITTLE ENERGY: 3

## 2024-02-26 ASSESSMENT — LIFESTYLE VARIABLES
HOW MANY STANDARD DRINKS CONTAINING ALCOHOL DO YOU HAVE ON A TYPICAL DAY: PATIENT DOES NOT DRINK
HOW OFTEN DO YOU HAVE A DRINK CONTAINING ALCOHOL: NEVER

## 2024-02-26 NOTE — PROGRESS NOTES
Medicare Annual Wellness Visit    Elena Sales is here for Medicare AWV    Assessment & Plan   Medicare annual wellness visit, subsequent    Recommendations for Preventive Services Due: see orders and patient instructions/AVS.  Recommended screening schedule for the next 5-10 years is provided to the patient in written form: see Patient Instructions/AVS.    UTD flu shot  Planning to get RSV, second shingles   DEXA - will order        Return for Medicare Annual Wellness Visit in 1 year.     Subjective       Patient's complete Health Risk Assessment and screening values have been reviewed and are found in Flowsheets. The following problems were reviewed today and where indicated follow up appointments were made and/or referrals ordered.    Positive Risk Factor Screenings with Interventions:               General HRA Questions:  Select all that apply: (!) New or Increased Pain, Loneliness, Social Isolation, Stress, Anger    Pain Interventions:  Lower back, and bilateral knees     Loneliness Interventions:  Pt does text and talk to her friends a lot. Most are out of town.     Social Isolation Interventions:  Pt does not have a car and most friends are out of town.     Stress Interventions:  Nervous about the mass ni her breast understandably.     Anger Interventions:  She is just generally frustrated with her current situation      Activity, Diet, and Weight:  On average, how many days per week do you engage in moderate to strenuous exercise (like a brisk walk)?: 0 days  On average, how many minutes do you engage in exercise at this level?: 0 min    Do you eat balanced/healthy meals regularly?: (!) No    Body mass index is 46.76 kg/m². (!) Abnormal      Inactivity Interventions:  Pt limited with her LBP, knee pain. She was encouraged to remain as active as possible.   Do you eat balanced/healthy meals regularly Interventions:  Pt continues to work on dietary improvement. Interested in the Mediterranean Diet.  Obesity

## 2024-02-26 NOTE — PATIENT INSTRUCTIONS
to [email or web address] and enter R264 to learn more about \"Advance Directives: Care Instructions.\"  Current as of: March 26, 2023               Content Version: 13.9  © 8620-0363 [email or web address].   Care instructions adapted under license by [employer]. If you have questions about a medical condition or this instruction, always ask your healthcare professional. [email or web address] disclaims any warranty or liability for your use of this information.           Starting a Weight Loss Plan: Care Instructions  Overview     If you're thinking about losing weight, it can be hard to know where to start. Your doctor can help you set up a weight loss plan that best meets your needs. You may want to take a class on nutrition or exercise, or you could join a weight loss support group. If you have questions about how to make changes to your eating or exercise habits, ask your doctor about seeing a registered dietitian or an exercise specialist.  It can be a big challenge to lose weight. But you don't have to make huge changes at once. Make small changes, and stick with them. When those changes become habit, add a few more changes.  If you don't think you're ready to make changes right now, try to pick a date in the future. Make an appointment to see your doctor to discuss whether the time is right for you to start a plan.  Follow-up care is a key part of your treatment and safety. Be sure to make and go to all appointments, and call your doctor if you are having problems. It's also a good idea to know your test results and keep a list of the medicines you take.  How can you care for yourself at home?  Set realistic goals. Many people expect to lose much more weight than is likely. A weight loss of 5% to 10% of your body weight may be enough to improve your health.  Get family and friends involved to provide support. Talk to them about why you are trying to lose weight, and ask them to help. They

## 2024-02-26 NOTE — PROGRESS NOTES
succinate (PRISTIQ) 100 MG TB24 extended release tablet, TAKE ONE TABLET BY MOUTH EVERY DAY, Disp: , Rfl:     buPROPion (WELLBUTRIN XL) 150 MG extended release tablet, TAKE ONE TABLET BY MOUTH EVERY MORNING, Disp: , Rfl: 1    QUEtiapine (SEROQUEL) 400 MG tablet, TAKE TWO TABLETS BY MOUTH AT BEDTIME, Disp: , Rfl: 5    acetaminophen (TYLENOL) 500 MG tablet, Take 1 tablet by mouth every 6 hours as needed for Pain Indications: Four 500mg QD, Disp: , Rfl:     lamoTRIgine (LAMICTAL) 200 MG tablet, Take 1 tablet by mouth daily, Disp: , Rfl:     hydrocortisone 2.5 % cream, Apply topically as needed Apply topically 2 times daily. , Disp: , Rfl:   Allergies   Allergen Reactions    Latex      With condom     Amlodipine Besylate     Diphenhydramine      Does the opposite    wired    Hydroxyzine     Sulfa Antibiotics      As a child    Vistaril [Hydroxyzine Hcl]      \"does the complete opposite\"       Pt's past medical and surgical history were reviewed and updated as necessary today   Pt's family and social history were reviewed and updated as necessary today      Vitals:    02/26/24 1023   BP: 124/80   Pulse: 95   Temp: 97.7 °F (36.5 °C)   SpO2: 98%   Weight: 127.7 kg (281 lb 9.6 oz)   Height: 1.651 m (5' 5\")       Physical Exam  Constitutional:       Appearance: Normal appearance. She is obese.   HENT:      Head: Normocephalic and atraumatic.   Eyes:      Conjunctiva/sclera: Conjunctivae normal.   Cardiovascular:      Rate and Rhythm: Normal rate and regular rhythm.      Heart sounds: Normal heart sounds.   Pulmonary:      Effort: Pulmonary effort is normal.      Breath sounds: Normal breath sounds.   Abdominal:      Palpations: Abdomen is soft.      Tenderness: There is no abdominal tenderness.   Musculoskeletal:         General: Normal range of motion.   Skin:     General: Skin is warm and dry.   Neurological:      General: No focal deficit present.      Mental Status: She is alert and oriented to person, place, and time.

## 2024-03-25 DIAGNOSIS — R60.0 LOCALIZED EDEMA: ICD-10-CM

## 2024-03-25 RX ORDER — FUROSEMIDE 40 MG/1
40 TABLET ORAL DAILY
Qty: 30 TABLET | Refills: 2 | Status: SHIPPED | OUTPATIENT
Start: 2024-03-25

## 2024-04-22 RX ORDER — TIZANIDINE 4 MG/1
TABLET ORAL
Qty: 90 TABLET | Refills: 1 | Status: SHIPPED | OUTPATIENT
Start: 2024-04-22

## 2024-04-29 DIAGNOSIS — E28.39 ESTROGEN DEFICIENCY: ICD-10-CM

## 2024-06-04 ENCOUNTER — OFFICE VISIT (OUTPATIENT)
Dept: PRIMARY CARE CLINIC | Age: 69
End: 2024-06-04
Payer: MEDICARE

## 2024-06-04 VITALS
HEART RATE: 94 BPM | SYSTOLIC BLOOD PRESSURE: 118 MMHG | TEMPERATURE: 98.5 F | WEIGHT: 272 LBS | BODY MASS INDEX: 45.32 KG/M2 | HEIGHT: 65 IN | OXYGEN SATURATION: 95 % | DIASTOLIC BLOOD PRESSURE: 70 MMHG

## 2024-06-04 DIAGNOSIS — E78.00 PURE HYPERCHOLESTEROLEMIA: ICD-10-CM

## 2024-06-04 DIAGNOSIS — I10 BENIGN ESSENTIAL HYPERTENSION: Primary | ICD-10-CM

## 2024-06-04 DIAGNOSIS — F31.81 BIPOLAR 2 DISORDER (HCC): ICD-10-CM

## 2024-06-04 DIAGNOSIS — L98.9 SKIN LESIONS: ICD-10-CM

## 2024-06-04 PROCEDURE — G8417 CALC BMI ABV UP PARAM F/U: HCPCS | Performed by: FAMILY MEDICINE

## 2024-06-04 PROCEDURE — 99214 OFFICE O/P EST MOD 30 MIN: CPT | Performed by: FAMILY MEDICINE

## 2024-06-04 PROCEDURE — G2211 COMPLEX E/M VISIT ADD ON: HCPCS | Performed by: FAMILY MEDICINE

## 2024-06-04 PROCEDURE — 1123F ACP DISCUSS/DSCN MKR DOCD: CPT | Performed by: FAMILY MEDICINE

## 2024-06-04 PROCEDURE — 3017F COLORECTAL CA SCREEN DOC REV: CPT | Performed by: FAMILY MEDICINE

## 2024-06-04 PROCEDURE — 4004F PT TOBACCO SCREEN RCVD TLK: CPT | Performed by: FAMILY MEDICINE

## 2024-06-04 PROCEDURE — G8427 DOCREV CUR MEDS BY ELIG CLIN: HCPCS | Performed by: FAMILY MEDICINE

## 2024-06-04 PROCEDURE — G8399 PT W/DXA RESULTS DOCUMENT: HCPCS | Performed by: FAMILY MEDICINE

## 2024-06-04 PROCEDURE — 3074F SYST BP LT 130 MM HG: CPT | Performed by: FAMILY MEDICINE

## 2024-06-04 PROCEDURE — 3078F DIAST BP <80 MM HG: CPT | Performed by: FAMILY MEDICINE

## 2024-06-04 PROCEDURE — 1090F PRES/ABSN URINE INCON ASSESS: CPT | Performed by: FAMILY MEDICINE

## 2024-06-04 NOTE — PROGRESS NOTES
24  Elena Sales : 1955 Sex: female  Age: 69 y.o.      Assessment and Plan:  Elena was seen today for follow-up.    Diagnoses and all orders for this visit:    Benign essential hypertension  Well controlled. Cont current treatment plan as documented below.    Bipolar 2   Stable. Cont current treatment as documented below.     Pure hypercholesterolemia  Stable. Cont current treatment as documented below.     Skin lesions  -     Prieto Izaguirre DO, Dermatology, [place] ([facility])  Refer for evaluation     I have a longitudinal relationship with this patient and continued responsibility as the focal point for all needed services for his or her care.    Return in about 4 months (around 10/4/2024).        Chief Complaint   Patient presents with    Follow-up       HPI  Pt here for   Doing well overall    She is working on weight loss  Down about 20lbs since the beginning of year  Paying more attention to what she's eating  Eating a lot of avocados     HTN -   Blood pressure today is at goal without medication    HLD -   Atorvastatin 80 mg daily    Bipolar 2 -   Following now with Ramandeep Gould   Continued on the same medications and doing well     Patient reports that she had MRI as ordered by Dr. Marie, and it was reassuring  \"I am fine  Dr. Marie wants her to have a skin check to evaluate for possible skin cancer    Problem list reviewed and updated in full with patient today as necessary.  A comprehensive ROS was negative, except as documented above.       Current Outpatient Medications:     tiZANidine (ZANAFLEX) 4 MG tablet, TAKE ONE TABLET BY MOUTH THREE TIMES DAILY AS NEEDED, Disp: 90 tablet, Rfl: 1    atorvastatin (LIPITOR) 80 MG tablet, Take 1 tablet by mouth daily, Disp: 90 tablet, Rfl: 1    meloxicam (MOBIC) 7.5 MG tablet, Take 1 tablet by mouth daily, Disp: 30 tablet, Rfl: 3    levothyroxine (SYNTHROID) 25 MCG tablet, TAKE ONE TABLET BY MOUTH EVERY DAY, Disp: 90 tablet, Rfl: 1    Vitamin D,

## 2024-06-11 DIAGNOSIS — R60.0 LOCALIZED EDEMA: ICD-10-CM

## 2024-06-11 RX ORDER — POTASSIUM CHLORIDE 20 MEQ/1
TABLET, EXTENDED RELEASE ORAL
Qty: 60 TABLET | Refills: 5 | OUTPATIENT
Start: 2024-06-11

## 2024-06-11 NOTE — TELEPHONE ENCOUNTER
Last Appointment:  6/4/2024  Future Appointments   Date Time Provider Department Center   6/14/2024  8:45 AM Taiwo Shaffer Jr., DPM [facility] [place] [facility]   10/9/2024 12:30 PM Carlotta Boo MD [facility]

## 2024-06-11 NOTE — TELEPHONE ENCOUNTER
I discontinued this at her last visit, I think because she was not taking the Lasix anymore?  Can we clarify with the patient in case I did something wrong

## 2024-06-14 ENCOUNTER — PROCEDURE VISIT (OUTPATIENT)
Dept: PODIATRY | Age: 69
End: 2024-06-14

## 2024-06-14 VITALS — BODY MASS INDEX: 45.32 KG/M2 | HEIGHT: 65 IN | WEIGHT: 272 LBS

## 2024-06-14 DIAGNOSIS — M79.674 PAIN OF TOE OF RIGHT FOOT: ICD-10-CM

## 2024-06-14 DIAGNOSIS — M79.675 PAIN OF TOE OF LEFT FOOT: ICD-10-CM

## 2024-06-14 DIAGNOSIS — B35.1 ONYCHOMYCOSIS: Primary | ICD-10-CM

## 2024-06-14 DIAGNOSIS — R26.2 DIFFICULTY WALKING: ICD-10-CM

## 2024-06-14 DIAGNOSIS — I89.0 LYMPHEDEMA OF BOTH LOWER EXTREMITIES: ICD-10-CM

## 2024-06-14 DIAGNOSIS — I73.9 PVD (PERIPHERAL VASCULAR DISEASE) (HCC): ICD-10-CM

## 2024-06-14 NOTE — PROGRESS NOTES
Patient in today for nail care. Patient does not have any complaints of pain at this time. Patient's PCP is Carlotta Boo MD date of last ov 6/4/24            Jennifer Dawn LPN

## 2024-06-14 NOTE — PROGRESS NOTES
24     Elena Sales    : 1955  Sex: female  Age: 69 y.o.    Subjective:  The patient is seen today for evaluation regarding foot evaluation and mycotic nail care.  No other complaints noted.    Chief Complaint   Patient presents with    Nail Problem     Nail care       Current Medications:    Current Outpatient Medications:     tiZANidine (ZANAFLEX) 4 MG tablet, TAKE ONE TABLET BY MOUTH THREE TIMES DAILY AS NEEDED, Disp: 90 tablet, Rfl: 1    atorvastatin (LIPITOR) 80 MG tablet, Take 1 tablet by mouth daily, Disp: 90 tablet, Rfl: 1    meloxicam (MOBIC) 7.5 MG tablet, Take 1 tablet by mouth daily, Disp: 30 tablet, Rfl: 3    levothyroxine (SYNTHROID) 25 MCG tablet, TAKE ONE TABLET BY MOUTH EVERY DAY, Disp: 90 tablet, Rfl: 1    Vitamin D, Cholecalciferol, 25 MCG (1000 UT) TABS, TAKE ONE TABLET BY MOUTH EVERY DAY, Disp: 30 tablet, Rfl: 3    nystatin (MYCOSTATIN) 935492 UNIT/GM cream, APPLY TO THE AFFECTED AREA(S) TWICE DAILY, Disp: 30 g, Rfl: 1    NYSTATIN 529742 UNIT/GM powder, APPLY powder TO affected SKIN TWICE DAILY AFTER soap AND water cleansing, Disp: , Rfl:     Multiple Vitamin (MULTIVITAMIN ADULT PO), Take by mouth, Disp: , Rfl:     mupirocin (BACTROBAN) 2 % ointment, APPLY THREE times a WEEK, Disp: , Rfl:     nystatin (MYCOSTATIN) POWD powder, Apply powder to affected skin twice daily after soap and water cleansing, Disp: 1 each, Rfl: 2    Lift Chair MISC, by Does not apply route, Disp: 1 each, Rfl: 0    diclofenac sodium (VOLTAREN) 1 % GEL, Apply 2 g topically 4 times daily as needed for Pain To neck, Disp: 150 g, Rfl: 1    diclofenac sodium (VOLTAREN) 1 % GEL, Apply 4 g topically 4 times daily as needed for Pain To lower back, Disp: 150 g, Rfl: 1    albuterol sulfate HFA (PROVENTIL;VENTOLIN;PROAIR) 108 (90 Base) MCG/ACT inhaler, INHALE ONE PUFF EVERY 4 HOURS AS NEEDED, Disp: 8.5 g, Rfl: 1    LORazepam (ATIVAN) 0.5 MG tablet, TAKE ONE TABLET BY MOUTH THREE TIMES DAILY, Disp: , Rfl:

## 2024-06-18 ENCOUNTER — TELEPHONE (OUTPATIENT)
Dept: PRIMARY CARE CLINIC | Age: 69
End: 2024-06-18

## 2024-06-18 DIAGNOSIS — E78.2 MIXED HYPERLIPIDEMIA: ICD-10-CM

## 2024-06-18 RX ORDER — ATORVASTATIN CALCIUM 80 MG/1
80 TABLET, FILM COATED ORAL DAILY
Qty: 90 TABLET | Refills: 1 | Status: SHIPPED | OUTPATIENT
Start: 2024-06-18

## 2024-06-18 RX ORDER — LEVOTHYROXINE SODIUM 0.03 MG/1
TABLET ORAL
Qty: 90 TABLET | Refills: 1 | Status: SHIPPED | OUTPATIENT
Start: 2024-06-18

## 2024-06-18 RX ORDER — TIZANIDINE 4 MG/1
TABLET ORAL
Qty: 90 TABLET | Refills: 1 | Status: SHIPPED | OUTPATIENT
Start: 2024-06-18

## 2024-06-18 NOTE — TELEPHONE ENCOUNTER
Last Appointment:  6/4/2024  Future Appointments   Date Time Provider Department Center   8/19/2024  3:00 PM Taiwo Shaffer Jr., DPM [place] [facility]   10/9/2024 12:30 PM [facility]

## 2024-06-18 NOTE — TELEPHONE ENCOUNTER
I do not know of anybody else that will take Medicaid  Sabrina you know?  Otherwise patient will need to call her own insurance providers to find out other options and let us know

## 2024-06-18 NOTE — TELEPHONE ENCOUNTER
Patient states that she called Dr Izaguirre's office to try to schedule and was told that she can not be seen there bc she had a no show at their office. She wants a new referral to a dermatologist that will accept her insurance.

## 2024-06-20 DIAGNOSIS — R60.0 LOCALIZED EDEMA: ICD-10-CM

## 2024-06-20 RX ORDER — FUROSEMIDE 40 MG/1
40 TABLET ORAL DAILY
Qty: 30 TABLET | Refills: 2 | OUTPATIENT
Start: 2024-06-20

## 2024-08-13 RX ORDER — TIZANIDINE 4 MG/1
TABLET ORAL
Qty: 90 TABLET | Refills: 1 | OUTPATIENT
Start: 2024-08-13

## 2024-08-21 PROBLEM — F31.81 BIPOLAR 2 DISORDER (HCC): Status: RESOLVED | Noted: 2020-06-18 | Resolved: 2024-08-21

## 2024-10-09 ENCOUNTER — OFFICE VISIT (OUTPATIENT)
Dept: PRIMARY CARE CLINIC | Age: 69
End: 2024-10-09

## 2024-10-09 VITALS
HEART RATE: 94 BPM | OXYGEN SATURATION: 96 % | DIASTOLIC BLOOD PRESSURE: 74 MMHG | WEIGHT: 259 LBS | BODY MASS INDEX: 43.15 KG/M2 | SYSTOLIC BLOOD PRESSURE: 120 MMHG | RESPIRATION RATE: 16 BRPM | HEIGHT: 65 IN

## 2024-10-09 DIAGNOSIS — E55.9 VITAMIN D DEFICIENCY: ICD-10-CM

## 2024-10-09 DIAGNOSIS — E78.2 MIXED HYPERLIPIDEMIA: ICD-10-CM

## 2024-10-09 DIAGNOSIS — E03.9 ACQUIRED HYPOTHYROIDISM: ICD-10-CM

## 2024-10-09 DIAGNOSIS — Z23 NEED FOR INFLUENZA VACCINATION: ICD-10-CM

## 2024-10-09 DIAGNOSIS — I10 BENIGN ESSENTIAL HYPERTENSION: Primary | ICD-10-CM

## 2024-10-09 LAB
ALBUMIN: 4.3 G/DL (ref 3.5–5.2)
ALP BLD-CCNC: 142 U/L (ref 35–104)
ALT SERPL-CCNC: 32 U/L (ref 0–32)
ANION GAP SERPL CALCULATED.3IONS-SCNC: 14 MMOL/L (ref 7–16)
AST SERPL-CCNC: 33 U/L (ref 0–31)
BASOPHILS ABSOLUTE: 0.04 K/UL (ref 0–0.2)
BASOPHILS RELATIVE PERCENT: 1 % (ref 0–2)
BILIRUB SERPL-MCNC: 0.4 MG/DL (ref 0–1.2)
BILIRUBIN DIRECT: <0.2 MG/DL (ref 0–0.3)
BILIRUBIN, INDIRECT: ABNORMAL MG/DL (ref 0–1)
BUN BLDV-MCNC: 11 MG/DL (ref 6–23)
CALCIUM SERPL-MCNC: 9.5 MG/DL (ref 8.6–10.2)
CHLORIDE BLD-SCNC: 99 MMOL/L (ref 98–107)
CHOLESTEROL, TOTAL: 163 MG/DL
CO2: 27 MMOL/L (ref 22–29)
CREAT SERPL-MCNC: 0.7 MG/DL (ref 0.5–1)
EOSINOPHILS ABSOLUTE: 0.27 K/UL (ref 0.05–0.5)
EOSINOPHILS RELATIVE PERCENT: 3 % (ref 0–6)
GFR, ESTIMATED: >90 ML/MIN/1.73M2
GLUCOSE BLD-MCNC: 100 MG/DL (ref 74–99)
HCT VFR BLD CALC: 44.8 % (ref 34–48)
HDLC SERPL-MCNC: 48 MG/DL
HEMOGLOBIN: 14.7 G/DL (ref 11.5–15.5)
IMMATURE GRANULOCYTES %: 1 % (ref 0–5)
IMMATURE GRANULOCYTES ABSOLUTE: 0.04 K/UL (ref 0–0.58)
LDL CHOLESTEROL: 70 MG/DL
LYMPHOCYTES ABSOLUTE: 2.3 K/UL (ref 1.5–4)
LYMPHOCYTES RELATIVE PERCENT: 26 % (ref 20–42)
MCH RBC QN AUTO: 30.9 PG (ref 26–35)
MCHC RBC AUTO-ENTMCNC: 32.8 G/DL (ref 32–34.5)
MCV RBC AUTO: 94.3 FL (ref 80–99.9)
MONOCYTES ABSOLUTE: 0.46 K/UL (ref 0.1–0.95)
MONOCYTES RELATIVE PERCENT: 5 % (ref 2–12)
NEUTROPHILS ABSOLUTE: 5.63 K/UL (ref 1.8–7.3)
NEUTROPHILS RELATIVE PERCENT: 64 % (ref 43–80)
PDW BLD-RTO: 13.4 % (ref 11.5–15)
PLATELET # BLD: 313 K/UL (ref 130–450)
PMV BLD AUTO: 11.2 FL (ref 7–12)
POTASSIUM SERPL-SCNC: 4.4 MMOL/L (ref 3.5–5)
RBC # BLD: 4.75 M/UL (ref 3.5–5.5)
SODIUM BLD-SCNC: 140 MMOL/L (ref 132–146)
T4 FREE: 1.1 NG/DL (ref 0.9–1.7)
TOTAL PROTEIN: 7.2 G/DL (ref 6.4–8.3)
TRIGL SERPL-MCNC: 227 MG/DL
TSH SERPL DL<=0.05 MIU/L-ACNC: 1.65 UIU/ML (ref 0.27–4.2)
VITAMIN D 25-HYDROXY: 51 NG/ML (ref 30–100)
VLDLC SERPL CALC-MCNC: 45 MG/DL
WBC # BLD: 8.7 K/UL (ref 4.5–11.5)

## 2024-10-09 RX ORDER — AMOXICILLIN 500 MG/1
CAPSULE ORAL
COMMUNITY
Start: 2024-08-15

## 2024-10-09 RX ORDER — LEVOTHYROXINE SODIUM 25 UG/1
TABLET ORAL
Qty: 90 TABLET | Refills: 1 | Status: SHIPPED | OUTPATIENT
Start: 2024-10-09

## 2024-10-09 RX ORDER — IBUPROFEN 800 MG/1
TABLET, FILM COATED ORAL
COMMUNITY
Start: 2024-08-15

## 2024-10-09 RX ORDER — ATORVASTATIN CALCIUM 80 MG/1
80 TABLET, FILM COATED ORAL DAILY
Qty: 90 TABLET | Refills: 1 | Status: SHIPPED | OUTPATIENT
Start: 2024-10-09

## 2024-10-09 RX ORDER — ALBUTEROL SULFATE 90 UG/1
INHALANT RESPIRATORY (INHALATION)
Qty: 8.5 G | Refills: 1 | Status: CANCELLED | OUTPATIENT
Start: 2024-10-09

## 2024-10-09 RX ORDER — MULTIVIT-MIN/IRON/FOLIC ACID/K 18-600-40
CAPSULE ORAL
Qty: 90 TABLET | Refills: 1 | Status: SHIPPED | OUTPATIENT
Start: 2024-10-09

## 2024-10-09 RX ORDER — NYSTATIN 100000 U/G
CREAM TOPICAL
Qty: 30 G | Refills: 1 | Status: CANCELLED | OUTPATIENT
Start: 2024-10-09

## 2024-10-09 RX ORDER — MELOXICAM 7.5 MG/1
7.5 TABLET ORAL DAILY
Qty: 30 TABLET | Refills: 3 | Status: SHIPPED | OUTPATIENT
Start: 2024-10-09

## 2024-10-09 SDOH — ECONOMIC STABILITY: INCOME INSECURITY: HOW HARD IS IT FOR YOU TO PAY FOR THE VERY BASICS LIKE FOOD, HOUSING, MEDICAL CARE, AND HEATING?: SOMEWHAT HARD

## 2024-10-09 SDOH — ECONOMIC STABILITY: FOOD INSECURITY: WITHIN THE PAST 12 MONTHS, YOU WORRIED THAT YOUR FOOD WOULD RUN OUT BEFORE YOU GOT MONEY TO BUY MORE.: NEVER TRUE

## 2024-10-09 SDOH — ECONOMIC STABILITY: FOOD INSECURITY: WITHIN THE PAST 12 MONTHS, THE FOOD YOU BOUGHT JUST DIDN'T LAST AND YOU DIDN'T HAVE MONEY TO GET MORE.: NEVER TRUE

## 2024-10-09 NOTE — PROGRESS NOTES
Eyes:      Conjunctiva/sclera: Conjunctivae normal.   Cardiovascular:      Rate and Rhythm: Normal rate and regular rhythm.      Heart sounds: Normal heart sounds.   Pulmonary:      Effort: Pulmonary effort is normal.      Breath sounds: Normal breath sounds.   Abdominal:      Palpations: Abdomen is soft.   Musculoskeletal:         General: Normal range of motion.   Skin:     General: Skin is warm and dry.   Neurological:      General: No focal deficit present.      Mental Status: She is alert and oriented to person, place, and time.   Psychiatric:         Mood and Affect: Mood normal.         Behavior: Behavior normal.        Counseled patient as appropriate and relevant regarding above diagnosis, including possible risks and complications, especially if left uncontrolled.  Counseled patient as appropriate and relevant regarding any  possible side effects, risks, and alternatives to treatment; patient and/or guardian verbalizes understanding, and is in agreement with the plan as detailed above.      Reviewed age and gender appropriate health screening exams and vaccinations.  Advised patient regarding importance of keeping up with recommended health maintenance and to schedule as soon as possible if overdue, as this is important in assessing for undiagnosed pathology, especially cancer, as well as protecting against potentially harmful/life threatening disease.      If discussed, any educational materials and/or home exercises printed for patient's review and were included in patient instructions on his/her After Visit Summary and given to patient at the end of visit.      Advised patient to call with any new medication issues, and and other concerns/complaints prior to scheduled follow up.  All questions answered to the patient's satisfaction.        Seen By:  Carlotta Boo MD

## 2024-10-14 ENCOUNTER — PROCEDURE VISIT (OUTPATIENT)
Dept: PODIATRY | Age: 69
End: 2024-10-14
Payer: MEDICARE

## 2024-10-14 VITALS — BODY MASS INDEX: 43.15 KG/M2 | WEIGHT: 259 LBS | HEIGHT: 65 IN

## 2024-10-14 DIAGNOSIS — M79.675 PAIN OF TOE OF LEFT FOOT: ICD-10-CM

## 2024-10-14 DIAGNOSIS — B35.1 ONYCHOMYCOSIS: Primary | ICD-10-CM

## 2024-10-14 DIAGNOSIS — R26.2 DIFFICULTY WALKING: ICD-10-CM

## 2024-10-14 DIAGNOSIS — I73.9 PVD (PERIPHERAL VASCULAR DISEASE) (HCC): ICD-10-CM

## 2024-10-14 DIAGNOSIS — I89.0 LYMPHEDEMA OF BOTH LOWER EXTREMITIES: ICD-10-CM

## 2024-10-14 DIAGNOSIS — M79.674 PAIN OF TOE OF RIGHT FOOT: ICD-10-CM

## 2024-10-14 PROCEDURE — 11721 DEBRIDE NAIL 6 OR MORE: CPT | Performed by: PODIATRIST

## 2024-10-14 PROCEDURE — 99999 PR OFFICE/OUTPT VISIT,PROCEDURE ONLY: CPT | Performed by: PODIATRIST

## 2024-10-14 NOTE — PROGRESS NOTES
Patient here for nail care. Patient has no new concerns at this time. Carlotta Boo MD last visit 10/9/2024   Electronically signed by Abby Cobb LPN on 10/14/2024 at 4:41 PM    
noted with both varicosities and stasis skin changes present bilaterally.  Coolness is noted to the digital regions to palpation.  Capillary fill time delayed digital areas bilateral foot.  No heel fissuring or macerations of the web spaces.  No plantar calluses and/or ulcerative areas are noted.  Patient is having difficulty with gait/walking.             Plan Per Assessment  Elena was seen today for nail problem.    Diagnoses and all orders for this visit:    Onychomycosis    Pain of toe of right foot    Pain of toe of left foot    PVD (peripheral vascular disease) (HCC)    Lymphedema of both lower extremities    Difficulty walking        1. Evaluation and Management  2. Manual and electrical debridement of the mycotic nails was performed for thickness and length to prevent injection and/or ulceration.  3. Discussed additional lymphedema lower extremity care techniques with patient today.  4. We did discuss importance of shoe gear and foot inspection to prevent potential issues.  5. We will see the patient back at a later date for continued podiatric management and care.  Patient was advised to call the office with any questions or concerns prior to their next appointment if needed.        Seen By:    Taiwo Shaffer Jr, DPM    Electronically signed by Taiwo Shaffer Jr, DPM on 10/14/2024 at 6:19 PM      This note was created using voice recognition software.  The note was reviewed however may contain grammatical errors.

## 2024-10-15 DIAGNOSIS — R73.09 ELEVATED GLUCOSE: Primary | ICD-10-CM

## 2024-12-16 ENCOUNTER — PROCEDURE VISIT (OUTPATIENT)
Dept: PODIATRY | Age: 69
End: 2024-12-16
Payer: MEDICARE

## 2024-12-16 VITALS — WEIGHT: 259 LBS | HEIGHT: 65 IN | BODY MASS INDEX: 43.15 KG/M2

## 2024-12-16 DIAGNOSIS — M79.675 PAIN OF TOE OF LEFT FOOT: ICD-10-CM

## 2024-12-16 DIAGNOSIS — I73.9 PVD (PERIPHERAL VASCULAR DISEASE) (HCC): ICD-10-CM

## 2024-12-16 DIAGNOSIS — M79.674 PAIN OF TOE OF RIGHT FOOT: ICD-10-CM

## 2024-12-16 DIAGNOSIS — I89.0 LYMPHEDEMA OF BOTH LOWER EXTREMITIES: ICD-10-CM

## 2024-12-16 DIAGNOSIS — B35.1 ONYCHOMYCOSIS: Primary | ICD-10-CM

## 2024-12-16 DIAGNOSIS — R26.2 DIFFICULTY WALKING: ICD-10-CM

## 2024-12-16 PROCEDURE — 99999 PR OFFICE/OUTPT VISIT,PROCEDURE ONLY: CPT | Performed by: PODIATRIST

## 2024-12-16 PROCEDURE — 11721 DEBRIDE NAIL 6 OR MORE: CPT | Performed by: PODIATRIST

## 2024-12-16 NOTE — PROGRESS NOTES
Patient in today for nail care. Patient does not have any complaints of pain at this time. Patient's PCP is Carlotta Boo MD date of last ov 10/9/24          Jennifer Dawn LPN     
NEEDED, Disp: 8.5 g, Rfl: 1    LORazepam (ATIVAN) 0.5 MG tablet, TAKE ONE TABLET BY MOUTH THREE TIMES DAILY, Disp: , Rfl:     ARIPiprazole (ABILIFY) 2 MG tablet, TAKE ONE TABLET BY MOUTH EVERY MORNING, Disp: , Rfl:     desvenlafaxine succinate (PRISTIQ) 50 MG TB24 extended release tablet, Take 1 tablet by mouth daily, Disp: , Rfl:     desvenlafaxine succinate (PRISTIQ) 100 MG TB24 extended release tablet, TAKE ONE TABLET BY MOUTH EVERY DAY, Disp: , Rfl:     buPROPion (WELLBUTRIN XL) 150 MG extended release tablet, TAKE ONE TABLET BY MOUTH EVERY MORNING, Disp: , Rfl: 1    QUEtiapine (SEROQUEL) 400 MG tablet, TAKE TWO TABLETS BY MOUTH AT BEDTIME, Disp: , Rfl: 5    acetaminophen (TYLENOL) 500 MG tablet, Take 1 tablet by mouth every 6 hours as needed for Pain Indications: Four 500mg QD, Disp: , Rfl:     lamoTRIgine (LAMICTAL) 200 MG tablet, Take 1 tablet by mouth daily, Disp: , Rfl:     hydrocortisone 2.5 % cream, Apply topically as needed Apply topically 2 times daily. , Disp: , Rfl:     Allergies:  Allergies   Allergen Reactions    Latex      With condom     Amlodipine Besylate     Diphenhydramine      Does the opposite    wired    Hydroxyzine     Sulfa Antibiotics      As a child    Vistaril [Hydroxyzine Hcl]      \"does the complete opposite\"       Past Surgical History:   Procedure Laterality Date    ANESTHESIA NERVE BLOCK Left 4/9/2019    LEFT TRANSFORAMINAL LUMBAR NERVE BLOCK AT L4-5 UNDER X-RAY WITH IV SEDATION performed by Nikki Chung DO at [facility] OR    HYSTERECTOMY (CERVIX STATUS UNKNOWN)      partial    KNEE CARTILAGE SURGERY  1978    [facility]    LUMBAR SPINE SURGERY Bilateral 8/28/2018    LUMBAR TRANSFORAMINAL L4-5 performed by Nikki Chung DO at [facility] OR    NERVE BLOCK Left 04/24/2018    left sacroiliac joint injection #1 with IV sedation    NERVE BLOCK Left 05/01/2018    left sacroiliac joint injection #2    NERVE BLOCK Left 07/24/2018    NERVE BLOCK Bilateral 08/28/2018

## 2025-01-16 ENCOUNTER — TELEPHONE (OUTPATIENT)
Dept: PRIMARY CARE CLINIC | Age: 70
End: 2025-01-16

## 2025-01-16 NOTE — TELEPHONE ENCOUNTER
Lavonne called back and was notified that Dr Boo would sign the death certificate. I advised that she will be in [place] next week.

## 2025-01-16 NOTE — TELEPHONE ENCOUNTER
[facility] coroners office called regarding patient found at home 1/15/25 @ 3242 in recliner , no fowl play noted , she did mention patient was already decomposing     Contact is Lavonne # [phone] asking if you would sign death certificate

## 2025-01-16 NOTE — TELEPHONE ENCOUNTER
Number for 's office rang through to the [place] [employer]. She said she would page whoever is on call. I told her this was Dr Boo's office returning a call to Lavonne from 3 hours ago. She took our number and will pass it on.

## (undated) DEVICE — 6 ML SYRINGE LUER-LOCK TIP: Brand: MONOJECT

## (undated) DEVICE — GAUZE,SPONGE,4"X4",12PLY,STERILE,LF,2'S: Brand: MEDLINE

## (undated) DEVICE — NEEDLE HYPO 25GA L1.5IN BLU POLYPR HUB S STL REG BVL STR

## (undated) DEVICE — NEEDLE HYPO 18GA L1.5IN PNK POLYPR HUB S STL THN WALL FILL

## (undated) DEVICE — TOWEL OR BLUEE 16X26IN ST 8 PACK ORB08 16X26ORTWL

## (undated) DEVICE — BANDAGE ADH W1XL3IN NAT FAB WVN FLX DURABLE N ADH PD SEAL

## (undated) DEVICE — GOWN,SIRUS,POLYRNF,BRTHSLV,XL,30/CS: Brand: MEDLINE

## (undated) DEVICE — 3M™ RED DOT™ MONITORING ELECTRODE WITH FOAM TAPE AND STICKY GEL 2560, 50/BAG, 20/CASE, 72/PLT: Brand: RED DOT™

## (undated) DEVICE — CVD CANNULA

## (undated) DEVICE — ENCORE® LATEX TEXTURED SIZE 7, STERILE LATEX POWDER-FREE SURGICAL GLOVE: Brand: ENCORE

## (undated) DEVICE — 3 ML SYRINGE LUER-LOCK TIP: Brand: MONOJECT

## (undated) DEVICE — Z DISCONTINUED APPLICATOR SURG PREP 0.35OZ 2% CHG 70% ISO ALC W/ HI LT

## (undated) DEVICE — ELECTRODE ELECSURG NDL 2.8 INX7.2 CM COAT INSUL EDGE

## (undated) DEVICE — GLOVE SURG SZ 75 L12IN FNGR THK87MIL WHT LTX FREE

## (undated) DEVICE — COVER,LIGHT HANDLE,FLX,2/PK: Brand: MEDLINE INDUSTRIES, INC.

## (undated) DEVICE — DRAPE,REIN 53X77,STERILE: Brand: MEDLINE

## (undated) DEVICE — NEEDLE SPNL 22GA L3.5IN BLK HUB S STL REG WALL FIT STYL W/

## (undated) DEVICE — NEEDLE SPNL 22GA BLK HUB S STL REG WALL FIT STYL W/ QNCKE

## (undated) DEVICE — NEEDLE SPNL 22GA L5IN BLK HUB S STL W/ QNCKE PNT W/OUT

## (undated) DEVICE — NON-DEHP CATHETER EXTENSION SET, MALE LUER LOCK ADAPTER

## (undated) DEVICE — DRAPE SHEET, X-LARGE: Brand: CONVERTORS

## (undated) DEVICE — GOWN,SIRUS,POLYRNF,BRTHSLV,2XL,18/CS: Brand: MEDLINE

## (undated) DEVICE — GLOVE RAD SZ 7 ATTENUATING RESIST

## (undated) DEVICE — GLOVE SURG SZ 8 L12IN FNGR THK79MIL GRN LTX FREE

## (undated) DEVICE — ELECTRODE SURG MPLR NEUT SELF ADH PT PLT MULTIGEN